# Patient Record
Sex: FEMALE | Race: WHITE
[De-identification: names, ages, dates, MRNs, and addresses within clinical notes are randomized per-mention and may not be internally consistent; named-entity substitution may affect disease eponyms.]

---

## 2020-08-11 ENCOUNTER — HOSPITAL ENCOUNTER (EMERGENCY)
Dept: HOSPITAL 8 - ED | Age: 65
Discharge: HOME | End: 2020-08-11
Payer: SELF-PAY

## 2020-08-11 VITALS — BODY MASS INDEX: 22.81 KG/M2 | HEIGHT: 65 IN | WEIGHT: 136.91 LBS

## 2020-08-11 VITALS — DIASTOLIC BLOOD PRESSURE: 82 MMHG | SYSTOLIC BLOOD PRESSURE: 118 MMHG

## 2020-08-11 DIAGNOSIS — R00.0: ICD-10-CM

## 2020-08-11 DIAGNOSIS — F17.210: ICD-10-CM

## 2020-08-11 DIAGNOSIS — A60.1: Primary | ICD-10-CM

## 2020-08-11 DIAGNOSIS — B37.9: ICD-10-CM

## 2020-08-11 PROCEDURE — 99283 EMERGENCY DEPT VISIT LOW MDM: CPT

## 2020-08-11 PROCEDURE — 93005 ELECTROCARDIOGRAM TRACING: CPT

## 2020-09-21 ENCOUNTER — HOSPITAL ENCOUNTER (OUTPATIENT)
Facility: MEDICAL CENTER | Age: 65
End: 2020-09-22
Attending: EMERGENCY MEDICINE | Admitting: HOSPITALIST

## 2020-09-21 ENCOUNTER — APPOINTMENT (OUTPATIENT)
Dept: RADIOLOGY | Facility: MEDICAL CENTER | Age: 65
End: 2020-09-21
Attending: EMERGENCY MEDICINE

## 2020-09-21 DIAGNOSIS — R10.30 INGUINAL PAIN, UNSPECIFIED LATERALITY: ICD-10-CM

## 2020-09-21 DIAGNOSIS — K62.9 LESION OF PERIANAL AREA: ICD-10-CM

## 2020-09-21 PROBLEM — L98.9 SKIN LESION OF RIGHT LOWER LIMB: Status: ACTIVE | Noted: 2020-09-21

## 2020-09-21 PROBLEM — N90.89 PERINEAL MASS IN FEMALE: Status: ACTIVE | Noted: 2020-09-21

## 2020-09-21 LAB
ANION GAP SERPL CALC-SCNC: 16 MMOL/L (ref 7–16)
BASOPHILS # BLD AUTO: 1.1 % (ref 0–1.8)
BASOPHILS # BLD: 0.08 K/UL (ref 0–0.12)
BUN SERPL-MCNC: 9 MG/DL (ref 8–22)
CALCIUM SERPL-MCNC: 8.4 MG/DL (ref 8.5–10.5)
CHLORIDE SERPL-SCNC: 106 MMOL/L (ref 96–112)
CO2 SERPL-SCNC: 16 MMOL/L (ref 20–33)
CREAT SERPL-MCNC: 0.54 MG/DL (ref 0.5–1.4)
EOSINOPHIL # BLD AUTO: 0.48 K/UL (ref 0–0.51)
EOSINOPHIL NFR BLD: 6.8 % (ref 0–6.9)
ERYTHROCYTE [DISTWIDTH] IN BLOOD BY AUTOMATED COUNT: 55.9 FL (ref 35.9–50)
GLUCOSE SERPL-MCNC: 78 MG/DL (ref 65–99)
HCT VFR BLD AUTO: 47.1 % (ref 37–47)
HGB BLD-MCNC: 15.3 G/DL (ref 12–16)
HIV 1+2 AB+HIV1 P24 AG SERPL QL IA: NORMAL
IMM GRANULOCYTES # BLD AUTO: 0.04 K/UL (ref 0–0.11)
IMM GRANULOCYTES NFR BLD AUTO: 0.6 % (ref 0–0.9)
LYMPHOCYTES # BLD AUTO: 1.47 K/UL (ref 1–4.8)
LYMPHOCYTES NFR BLD: 20.7 % (ref 22–41)
MCH RBC QN AUTO: 32.4 PG (ref 27–33)
MCHC RBC AUTO-ENTMCNC: 32.5 G/DL (ref 33.6–35)
MCV RBC AUTO: 99.8 FL (ref 81.4–97.8)
MONOCYTES # BLD AUTO: 0.46 K/UL (ref 0–0.85)
MONOCYTES NFR BLD AUTO: 6.5 % (ref 0–13.4)
NEUTROPHILS # BLD AUTO: 4.56 K/UL (ref 2–7.15)
NEUTROPHILS NFR BLD: 64.3 % (ref 44–72)
NRBC # BLD AUTO: 0 K/UL
NRBC BLD-RTO: 0 /100 WBC
PLATELET # BLD AUTO: 203 K/UL (ref 164–446)
PMV BLD AUTO: 9.5 FL (ref 9–12.9)
POTASSIUM SERPL-SCNC: 4.4 MMOL/L (ref 3.6–5.5)
RBC # BLD AUTO: 4.72 M/UL (ref 4.2–5.4)
SODIUM SERPL-SCNC: 138 MMOL/L (ref 135–145)
WBC # BLD AUTO: 7.1 K/UL (ref 4.8–10.8)

## 2020-09-21 PROCEDURE — 87529 HSV DNA AMP PROBE: CPT

## 2020-09-21 PROCEDURE — 80048 BASIC METABOLIC PNL TOTAL CA: CPT

## 2020-09-21 PROCEDURE — G0378 HOSPITAL OBSERVATION PER HR: HCPCS

## 2020-09-21 PROCEDURE — 87205 SMEAR GRAM STAIN: CPT

## 2020-09-21 PROCEDURE — 87186 SC STD MICRODIL/AGAR DIL: CPT

## 2020-09-21 PROCEDURE — 87147 CULTURE TYPE IMMUNOLOGIC: CPT

## 2020-09-21 PROCEDURE — 36415 COLL VENOUS BLD VENIPUNCTURE: CPT

## 2020-09-21 PROCEDURE — 99285 EMERGENCY DEPT VISIT HI MDM: CPT

## 2020-09-21 PROCEDURE — A9270 NON-COVERED ITEM OR SERVICE: HCPCS | Performed by: EMERGENCY MEDICINE

## 2020-09-21 PROCEDURE — 700102 HCHG RX REV CODE 250 W/ 637 OVERRIDE(OP): Performed by: EMERGENCY MEDICINE

## 2020-09-21 PROCEDURE — 87077 CULTURE AEROBIC IDENTIFY: CPT | Mod: 91

## 2020-09-21 PROCEDURE — 96376 TX/PRO/DX INJ SAME DRUG ADON: CPT

## 2020-09-21 PROCEDURE — 74177 CT ABD & PELVIS W/CONTRAST: CPT

## 2020-09-21 PROCEDURE — 99220 PR INITIAL OBSERVATION CARE,LEVL III: CPT | Mod: GC | Performed by: HOSPITALIST

## 2020-09-21 PROCEDURE — 85025 COMPLETE CBC W/AUTO DIFF WBC: CPT

## 2020-09-21 PROCEDURE — A9270 NON-COVERED ITEM OR SERVICE: HCPCS | Performed by: STUDENT IN AN ORGANIZED HEALTH CARE EDUCATION/TRAINING PROGRAM

## 2020-09-21 PROCEDURE — 700102 HCHG RX REV CODE 250 W/ 637 OVERRIDE(OP): Performed by: STUDENT IN AN ORGANIZED HEALTH CARE EDUCATION/TRAINING PROGRAM

## 2020-09-21 PROCEDURE — 700101 HCHG RX REV CODE 250: Performed by: EMERGENCY MEDICINE

## 2020-09-21 PROCEDURE — 96374 THER/PROPH/DIAG INJ IV PUSH: CPT

## 2020-09-21 PROCEDURE — 87070 CULTURE OTHR SPECIMN AEROBIC: CPT

## 2020-09-21 PROCEDURE — 96375 TX/PRO/DX INJ NEW DRUG ADDON: CPT

## 2020-09-21 PROCEDURE — 87389 HIV-1 AG W/HIV-1&-2 AB AG IA: CPT

## 2020-09-21 PROCEDURE — 700111 HCHG RX REV CODE 636 W/ 250 OVERRIDE (IP): Performed by: STUDENT IN AN ORGANIZED HEALTH CARE EDUCATION/TRAINING PROGRAM

## 2020-09-21 PROCEDURE — 700117 HCHG RX CONTRAST REV CODE 255: Performed by: EMERGENCY MEDICINE

## 2020-09-21 PROCEDURE — 700111 HCHG RX REV CODE 636 W/ 250 OVERRIDE (IP): Performed by: EMERGENCY MEDICINE

## 2020-09-21 RX ORDER — MORPHINE SULFATE 4 MG/ML
2 INJECTION, SOLUTION INTRAMUSCULAR; INTRAVENOUS
Status: DISCONTINUED | OUTPATIENT
Start: 2020-09-21 | End: 2020-09-22 | Stop reason: HOSPADM

## 2020-09-21 RX ORDER — POLYETHYLENE GLYCOL 3350 17 G/17G
1 POWDER, FOR SOLUTION ORAL
Status: DISCONTINUED | OUTPATIENT
Start: 2020-09-21 | End: 2020-09-21

## 2020-09-21 RX ORDER — ACYCLOVIR 800 MG/1
800 TABLET ORAL ONCE
Status: COMPLETED | OUTPATIENT
Start: 2020-09-21 | End: 2020-09-21

## 2020-09-21 RX ORDER — LIDOCAINE HYDROCHLORIDE 20 MG/ML
JELLY TOPICAL 4 TIMES DAILY
Status: DISCONTINUED | OUTPATIENT
Start: 2020-09-21 | End: 2020-09-22 | Stop reason: HOSPADM

## 2020-09-21 RX ORDER — CELECOXIB 100 MG/1
100 CAPSULE ORAL DAILY
Status: DISCONTINUED | OUTPATIENT
Start: 2020-09-21 | End: 2020-09-22 | Stop reason: HOSPADM

## 2020-09-21 RX ORDER — MORPHINE SULFATE 4 MG/ML
4 INJECTION, SOLUTION INTRAMUSCULAR; INTRAVENOUS ONCE
Status: COMPLETED | OUTPATIENT
Start: 2020-09-21 | End: 2020-09-21

## 2020-09-21 RX ORDER — HYDROMORPHONE HYDROCHLORIDE 1 MG/ML
0.5 INJECTION, SOLUTION INTRAMUSCULAR; INTRAVENOUS; SUBCUTANEOUS ONCE
Status: COMPLETED | OUTPATIENT
Start: 2020-09-21 | End: 2020-09-21

## 2020-09-21 RX ORDER — AMOXICILLIN 250 MG
2 CAPSULE ORAL 2 TIMES DAILY
Status: DISCONTINUED | OUTPATIENT
Start: 2020-09-21 | End: 2020-09-21

## 2020-09-21 RX ORDER — ACETAMINOPHEN 500 MG
500 TABLET ORAL EVERY 6 HOURS PRN
Status: DISCONTINUED | OUTPATIENT
Start: 2020-09-21 | End: 2020-09-21

## 2020-09-21 RX ORDER — NYSTATIN 100000 [USP'U]/G
POWDER TOPICAL 2 TIMES DAILY
Status: DISCONTINUED | OUTPATIENT
Start: 2020-09-21 | End: 2020-09-22 | Stop reason: HOSPADM

## 2020-09-21 RX ORDER — BISACODYL 10 MG
10 SUPPOSITORY, RECTAL RECTAL
Status: DISCONTINUED | OUTPATIENT
Start: 2020-09-21 | End: 2020-09-21

## 2020-09-21 RX ORDER — ACETAMINOPHEN 500 MG
1000 TABLET ORAL 3 TIMES DAILY
Status: DISCONTINUED | OUTPATIENT
Start: 2020-09-22 | End: 2020-09-22 | Stop reason: HOSPADM

## 2020-09-21 RX ORDER — BISACODYL 10 MG
10 SUPPOSITORY, RECTAL RECTAL
Status: DISCONTINUED | OUTPATIENT
Start: 2020-09-21 | End: 2020-09-22 | Stop reason: HOSPADM

## 2020-09-21 RX ORDER — POLYETHYLENE GLYCOL 3350 17 G/17G
1 POWDER, FOR SOLUTION ORAL DAILY
Status: DISCONTINUED | OUTPATIENT
Start: 2020-09-22 | End: 2020-09-22 | Stop reason: HOSPADM

## 2020-09-21 RX ORDER — LIDOCAINE AND PRILOCAINE 25; 25 MG/G; MG/G
CREAM TOPICAL ONCE
Status: COMPLETED | OUTPATIENT
Start: 2020-09-21 | End: 2020-09-21

## 2020-09-21 RX ORDER — LIDOCAINE HYDROCHLORIDE 20 MG/ML
JELLY TOPICAL 4 TIMES DAILY PRN
Status: DISCONTINUED | OUTPATIENT
Start: 2020-09-21 | End: 2020-09-21

## 2020-09-21 RX ORDER — AMOXICILLIN 250 MG
2 CAPSULE ORAL 2 TIMES DAILY
Status: DISCONTINUED | OUTPATIENT
Start: 2020-09-22 | End: 2020-09-22 | Stop reason: HOSPADM

## 2020-09-21 RX ORDER — LIDOCAINE HYDROCHLORIDE 20 MG/ML
JELLY TOPICAL
Status: DISCONTINUED | OUTPATIENT
Start: 2020-09-21 | End: 2020-09-21

## 2020-09-21 RX ADMIN — MORPHINE SULFATE 4 MG: 4 INJECTION INTRAVENOUS at 18:24

## 2020-09-21 RX ADMIN — MORPHINE SULFATE 2 MG: 4 INJECTION INTRAVENOUS at 22:24

## 2020-09-21 RX ADMIN — IOHEXOL 100 ML: 350 INJECTION, SOLUTION INTRAVENOUS at 21:32

## 2020-09-21 RX ADMIN — MORPHINE SULFATE 4 MG: 4 INJECTION INTRAVENOUS at 19:39

## 2020-09-21 RX ADMIN — LIDOCAINE AND PRILOCAINE 5 MG: 25; 25 CREAM TOPICAL at 19:47

## 2020-09-21 RX ADMIN — CELECOXIB 100 MG: 100 CAPSULE ORAL at 23:42

## 2020-09-21 RX ADMIN — HYDROMORPHONE HYDROCHLORIDE 0.5 MG: 1 INJECTION, SOLUTION INTRAMUSCULAR; INTRAVENOUS; SUBCUTANEOUS at 21:10

## 2020-09-21 RX ADMIN — ACYCLOVIR 800 MG: 800 TABLET ORAL at 19:43

## 2020-09-21 SDOH — HEALTH STABILITY: MENTAL HEALTH: HOW OFTEN DO YOU HAVE A DRINK CONTAINING ALCOHOL?: NEVER

## 2020-09-21 ASSESSMENT — LIFESTYLE VARIABLES
ALCOHOL_USE: NO
DOES PATIENT WANT TO STOP DRINKING: NO
HAVE PEOPLE ANNOYED YOU BY CRITICIZING YOUR DRINKING: NO
ON A TYPICAL DAY WHEN YOU DRINK ALCOHOL HOW MANY DRINKS DO YOU HAVE: 0
HAVE YOU EVER FELT YOU SHOULD CUT DOWN ON YOUR DRINKING: NO
TOTAL SCORE: 0
TOTAL SCORE: 0
EVER HAD A DRINK FIRST THING IN THE MORNING TO STEADY YOUR NERVES TO GET RID OF A HANGOVER: NO
EVER FELT BAD OR GUILTY ABOUT YOUR DRINKING: NO
TOTAL SCORE: 0
HOW MANY TIMES IN THE PAST YEAR HAVE YOU HAD 5 OR MORE DRINKS IN A DAY: 0
AVERAGE NUMBER OF DAYS PER WEEK YOU HAVE A DRINK CONTAINING ALCOHOL: 0
CONSUMPTION TOTAL: NEGATIVE

## 2020-09-21 ASSESSMENT — PATIENT HEALTH QUESTIONNAIRE - PHQ9
SUM OF ALL RESPONSES TO PHQ9 QUESTIONS 1 AND 2: 0
2. FEELING DOWN, DEPRESSED, IRRITABLE, OR HOPELESS: NOT AT ALL
1. LITTLE INTEREST OR PLEASURE IN DOING THINGS: NOT AT ALL

## 2020-09-21 ASSESSMENT — PAIN DESCRIPTION - PAIN TYPE: TYPE: ACUTE PAIN

## 2020-09-21 NOTE — ED TRIAGE NOTES
"Kayleen Alfredo  65 y.o.  Chief Complaint   Patient presents with   • Rectal Bleeding     Rectal Herpes   • Hearing Loss     in right ear       Patient to triage with above complaint. Pt report having genital and rectal herpes and they are bleeding \"the pain in excruciating\".  Pt also report having trouble hearing in right ear.    Vitals:    09/21/20 1504   BP: 144/98   Pulse: 100   Resp: 18   Temp: 36.5 °C (97.7 °F)   SpO2: 95%       Triage process explained to patient, apologized for wait time, and returned to [facility].  Pt informed to notify staff of any change in condition. NAD at this time.    "

## 2020-09-22 ENCOUNTER — PATIENT OUTREACH (OUTPATIENT)
Dept: HEALTH INFORMATION MANAGEMENT | Facility: OTHER | Age: 65
End: 2020-09-22

## 2020-09-22 ENCOUNTER — ANESTHESIA (OUTPATIENT)
Dept: SURGERY | Facility: MEDICAL CENTER | Age: 65
End: 2020-09-22

## 2020-09-22 ENCOUNTER — ANESTHESIA EVENT (OUTPATIENT)
Dept: SURGERY | Facility: MEDICAL CENTER | Age: 65
End: 2020-09-22

## 2020-09-22 VITALS
BODY MASS INDEX: 21.98 KG/M2 | DIASTOLIC BLOOD PRESSURE: 53 MMHG | HEART RATE: 71 BPM | OXYGEN SATURATION: 92 % | WEIGHT: 128.75 LBS | SYSTOLIC BLOOD PRESSURE: 110 MMHG | HEIGHT: 64 IN | RESPIRATION RATE: 17 BRPM | TEMPERATURE: 97.1 F

## 2020-09-22 PROBLEM — R52 INTRACTABLE PAIN: Status: ACTIVE | Noted: 2020-09-22

## 2020-09-22 PROBLEM — R71.8 ELEVATED MCV: Status: ACTIVE | Noted: 2020-09-22

## 2020-09-22 PROBLEM — K59.00 CONSTIPATION: Status: ACTIVE | Noted: 2020-09-22

## 2020-09-22 LAB
ALBUMIN SERPL BCP-MCNC: 2.6 G/DL (ref 3.2–4.9)
ALBUMIN/GLOB SERPL: 0.7 G/DL
ALP SERPL-CCNC: 86 U/L (ref 30–99)
ALT SERPL-CCNC: 33 U/L (ref 2–50)
AMPHET UR QL SCN: NEGATIVE
ANION GAP SERPL CALC-SCNC: 9 MMOL/L (ref 7–16)
APTT PPP: 29.9 SEC (ref 24.7–36)
AST SERPL-CCNC: 36 U/L (ref 12–45)
BARBITURATES UR QL SCN: NEGATIVE
BASOPHILS # BLD AUTO: 1.1 % (ref 0–1.8)
BASOPHILS # BLD: 0.07 K/UL (ref 0–0.12)
BENZODIAZ UR QL SCN: POSITIVE
BILIRUB SERPL-MCNC: 0.2 MG/DL (ref 0.1–1.5)
BUN SERPL-MCNC: 11 MG/DL (ref 8–22)
BZE UR QL SCN: NEGATIVE
CALCIUM SERPL-MCNC: 8 MG/DL (ref 8.5–10.5)
CANNABINOIDS UR QL SCN: NEGATIVE
CHLORIDE SERPL-SCNC: 103 MMOL/L (ref 96–112)
CO2 SERPL-SCNC: 22 MMOL/L (ref 20–33)
COVID ORDER STATUS COVID19: NORMAL
CREAT SERPL-MCNC: 0.6 MG/DL (ref 0.5–1.4)
EOSINOPHIL # BLD AUTO: 0.49 K/UL (ref 0–0.51)
EOSINOPHIL NFR BLD: 8 % (ref 0–6.9)
ERYTHROCYTE [DISTWIDTH] IN BLOOD BY AUTOMATED COUNT: 55.8 FL (ref 35.9–50)
FOLATE SERPL-MCNC: 9 NG/ML
GLOBULIN SER CALC-MCNC: 3.6 G/DL (ref 1.9–3.5)
GLUCOSE SERPL-MCNC: 121 MG/DL (ref 65–99)
GRAM STN SPEC: NORMAL
HCT VFR BLD AUTO: 39.7 % (ref 37–47)
HGB BLD-MCNC: 13.2 G/DL (ref 12–16)
HSV1 DNA SPEC QL NAA+PROBE: NEGATIVE
HSV2 DNA SPEC QL NAA+PROBE: NEGATIVE
IMM GRANULOCYTES # BLD AUTO: 0.03 K/UL (ref 0–0.11)
IMM GRANULOCYTES NFR BLD AUTO: 0.5 % (ref 0–0.9)
INR PPP: 1.11 (ref 0.87–1.13)
LYMPHOCYTES # BLD AUTO: 1.17 K/UL (ref 1–4.8)
LYMPHOCYTES NFR BLD: 19 % (ref 22–41)
MCH RBC QN AUTO: 33.2 PG (ref 27–33)
MCHC RBC AUTO-ENTMCNC: 33.2 G/DL (ref 33.6–35)
MCV RBC AUTO: 100 FL (ref 81.4–97.8)
METHADONE UR QL SCN: NEGATIVE
MONOCYTES # BLD AUTO: 0.39 K/UL (ref 0–0.85)
MONOCYTES NFR BLD AUTO: 6.3 % (ref 0–13.4)
NEUTROPHILS # BLD AUTO: 4 K/UL (ref 2–7.15)
NEUTROPHILS NFR BLD: 65.1 % (ref 44–72)
NRBC # BLD AUTO: 0 K/UL
NRBC BLD-RTO: 0 /100 WBC
OPIATES UR QL SCN: POSITIVE
OXYCODONE UR QL SCN: NEGATIVE
PATHOLOGY CONSULT NOTE: NORMAL
PCP UR QL SCN: NEGATIVE
PLATELET # BLD AUTO: 284 K/UL (ref 164–446)
PMV BLD AUTO: 8.6 FL (ref 9–12.9)
POTASSIUM SERPL-SCNC: 4 MMOL/L (ref 3.6–5.5)
PROPOXYPH UR QL SCN: NEGATIVE
PROT SERPL-MCNC: 6.2 G/DL (ref 6–8.2)
PROTHROMBIN TIME: 14.7 SEC (ref 12–14.6)
RBC # BLD AUTO: 3.97 M/UL (ref 4.2–5.4)
RPR SER QL: REACTIVE
RPR SER-TITR: NORMAL {TITER}
SARS-COV-2 RNA RESP QL NAA+PROBE: NOTDETECTED
SIGNIFICANT IND 70042: NORMAL
SITE SITE: NORMAL
SODIUM SERPL-SCNC: 134 MMOL/L (ref 135–145)
SOURCE SOURCE: NORMAL
SPECIMEN SOURCE: NORMAL
SPECIMEN SOURCE: NORMAL
TREPONEMA PALLIDUM IGG+IGM AB [PRESENCE] IN SERUM OR PLASMA BY IMMUNOASSAY: REACTIVE
VIT B12 SERPL-MCNC: 392 PG/ML (ref 211–911)
WBC # BLD AUTO: 6.2 K/UL (ref 4.8–10.8)

## 2020-09-22 PROCEDURE — 82746 ASSAY OF FOLIC ACID SERUM: CPT

## 2020-09-22 PROCEDURE — A9270 NON-COVERED ITEM OR SERVICE: HCPCS | Performed by: STUDENT IN AN ORGANIZED HEALTH CARE EDUCATION/TRAINING PROGRAM

## 2020-09-22 PROCEDURE — 82607 VITAMIN B-12: CPT

## 2020-09-22 PROCEDURE — 85730 THROMBOPLASTIN TIME PARTIAL: CPT

## 2020-09-22 PROCEDURE — C9803 HOPD COVID-19 SPEC COLLECT: HCPCS | Performed by: SURGERY

## 2020-09-22 PROCEDURE — 700111 HCHG RX REV CODE 636 W/ 250 OVERRIDE (IP): Performed by: STUDENT IN AN ORGANIZED HEALTH CARE EDUCATION/TRAINING PROGRAM

## 2020-09-22 PROCEDURE — 700102 HCHG RX REV CODE 250 W/ 637 OVERRIDE(OP): Performed by: STUDENT IN AN ORGANIZED HEALTH CARE EDUCATION/TRAINING PROGRAM

## 2020-09-22 PROCEDURE — 700105 HCHG RX REV CODE 258: Performed by: ANESTHESIOLOGY

## 2020-09-22 PROCEDURE — 88305 TISSUE EXAM BY PATHOLOGIST: CPT

## 2020-09-22 PROCEDURE — 160048 HCHG OR STATISTICAL LEVEL 1-5: Performed by: SURGERY

## 2020-09-22 PROCEDURE — 86592 SYPHILIS TEST NON-TREP QUAL: CPT

## 2020-09-22 PROCEDURE — A6403 STERILE GAUZE>16 <= 48 SQ IN: HCPCS | Performed by: SURGERY

## 2020-09-22 PROCEDURE — U0003 INFECTIOUS AGENT DETECTION BY NUCLEIC ACID (DNA OR RNA); SEVERE ACUTE RESPIRATORY SYNDROME CORONAVIRUS 2 (SARS-COV-2) (CORONAVIRUS DISEASE [COVID-19]), AMPLIFIED PROBE TECHNIQUE, MAKING USE OF HIGH THROUGHPUT TECHNOLOGIES AS DESCRIBED BY CMS-2020-01-R: HCPCS

## 2020-09-22 PROCEDURE — 80053 COMPREHEN METABOLIC PANEL: CPT

## 2020-09-22 PROCEDURE — 700101 HCHG RX REV CODE 250: Performed by: SURGERY

## 2020-09-22 PROCEDURE — 160009 HCHG ANES TIME/MIN: Performed by: SURGERY

## 2020-09-22 PROCEDURE — 160035 HCHG PACU - 1ST 60 MINS PHASE I: Performed by: SURGERY

## 2020-09-22 PROCEDURE — 160002 HCHG RECOVERY MINUTES (STAT): Performed by: SURGERY

## 2020-09-22 PROCEDURE — G0378 HOSPITAL OBSERVATION PER HR: HCPCS

## 2020-09-22 PROCEDURE — 86780 TREPONEMA PALLIDUM: CPT

## 2020-09-22 PROCEDURE — 80307 DRUG TEST PRSMV CHEM ANLYZR: CPT

## 2020-09-22 PROCEDURE — 99217 PR OBSERVATION CARE DISCHARGE: CPT | Mod: GC | Performed by: HOSPITALIST

## 2020-09-22 PROCEDURE — 86593 SYPHILIS TEST NON-TREP QUANT: CPT

## 2020-09-22 PROCEDURE — 36415 COLL VENOUS BLD VENIPUNCTURE: CPT

## 2020-09-22 PROCEDURE — 160028 HCHG SURGERY MINUTES - 1ST 30 MINS LEVEL 3: Performed by: SURGERY

## 2020-09-22 PROCEDURE — 85610 PROTHROMBIN TIME: CPT

## 2020-09-22 PROCEDURE — 88342 IMHCHEM/IMCYTCHM 1ST ANTB: CPT

## 2020-09-22 PROCEDURE — 700101 HCHG RX REV CODE 250: Performed by: ANESTHESIOLOGY

## 2020-09-22 PROCEDURE — 96372 THER/PROPH/DIAG INJ SC/IM: CPT

## 2020-09-22 PROCEDURE — 96376 TX/PRO/DX INJ SAME DRUG ADON: CPT

## 2020-09-22 PROCEDURE — 700111 HCHG RX REV CODE 636 W/ 250 OVERRIDE (IP): Performed by: ANESTHESIOLOGY

## 2020-09-22 PROCEDURE — 501838 HCHG SUTURE GENERAL: Performed by: SURGERY

## 2020-09-22 PROCEDURE — 85025 COMPLETE CBC W/AUTO DIFF WBC: CPT

## 2020-09-22 RX ORDER — IPRATROPIUM BROMIDE AND ALBUTEROL SULFATE 2.5; .5 MG/3ML; MG/3ML
3 SOLUTION RESPIRATORY (INHALATION)
Status: DISCONTINUED | OUTPATIENT
Start: 2020-09-22 | End: 2020-09-22 | Stop reason: HOSPADM

## 2020-09-22 RX ORDER — OXYCODONE HYDROCHLORIDE 5 MG/1
5-10 TABLET ORAL
Status: DISCONTINUED | OUTPATIENT
Start: 2020-09-22 | End: 2020-09-22 | Stop reason: HOSPADM

## 2020-09-22 RX ORDER — BUPIVACAINE HYDROCHLORIDE AND EPINEPHRINE 5; 5 MG/ML; UG/ML
INJECTION, SOLUTION EPIDURAL; INTRACAUDAL; PERINEURAL
Status: DISCONTINUED | OUTPATIENT
Start: 2020-09-22 | End: 2020-09-22 | Stop reason: HOSPADM

## 2020-09-22 RX ORDER — MAGNESIUM SULFATE HEPTAHYDRATE 40 MG/ML
INJECTION, SOLUTION INTRAVENOUS PRN
Status: DISCONTINUED | OUTPATIENT
Start: 2020-09-22 | End: 2020-09-22 | Stop reason: SURG

## 2020-09-22 RX ORDER — MEPERIDINE HYDROCHLORIDE 25 MG/ML
6.25 INJECTION INTRAMUSCULAR; INTRAVENOUS; SUBCUTANEOUS
Status: DISCONTINUED | OUTPATIENT
Start: 2020-09-22 | End: 2020-09-22 | Stop reason: HOSPADM

## 2020-09-22 RX ORDER — OXYCODONE HCL 5 MG/5 ML
10 SOLUTION, ORAL ORAL
Status: DISCONTINUED | OUTPATIENT
Start: 2020-09-22 | End: 2020-09-22 | Stop reason: HOSPADM

## 2020-09-22 RX ORDER — CEFAZOLIN SODIUM 1 G/3ML
INJECTION, POWDER, FOR SOLUTION INTRAMUSCULAR; INTRAVENOUS PRN
Status: DISCONTINUED | OUTPATIENT
Start: 2020-09-22 | End: 2020-09-22 | Stop reason: SURG

## 2020-09-22 RX ORDER — DEXAMETHASONE SODIUM PHOSPHATE 4 MG/ML
INJECTION, SOLUTION INTRA-ARTICULAR; INTRALESIONAL; INTRAMUSCULAR; INTRAVENOUS; SOFT TISSUE PRN
Status: DISCONTINUED | OUTPATIENT
Start: 2020-09-22 | End: 2020-09-22 | Stop reason: SURG

## 2020-09-22 RX ORDER — SODIUM CHLORIDE, SODIUM LACTATE, POTASSIUM CHLORIDE, CALCIUM CHLORIDE 600; 310; 30; 20 MG/100ML; MG/100ML; MG/100ML; MG/100ML
INJECTION, SOLUTION INTRAVENOUS CONTINUOUS
Status: DISCONTINUED | OUTPATIENT
Start: 2020-09-22 | End: 2020-09-22 | Stop reason: HOSPADM

## 2020-09-22 RX ORDER — CEFTRIAXONE SODIUM 250 MG/1
250 INJECTION, POWDER, FOR SOLUTION INTRAMUSCULAR; INTRAVENOUS ONCE
Status: COMPLETED | OUTPATIENT
Start: 2020-09-22 | End: 2020-09-22

## 2020-09-22 RX ORDER — OXYCODONE HCL 5 MG/5 ML
5 SOLUTION, ORAL ORAL
Status: DISCONTINUED | OUTPATIENT
Start: 2020-09-22 | End: 2020-09-22 | Stop reason: HOSPADM

## 2020-09-22 RX ORDER — SODIUM CHLORIDE, SODIUM LACTATE, POTASSIUM CHLORIDE, CALCIUM CHLORIDE 600; 310; 30; 20 MG/100ML; MG/100ML; MG/100ML; MG/100ML
INJECTION, SOLUTION INTRAVENOUS
Status: DISCONTINUED | OUTPATIENT
Start: 2020-09-22 | End: 2020-09-22 | Stop reason: SURG

## 2020-09-22 RX ORDER — ACETAMINOPHEN 500 MG
500 TABLET ORAL EVERY 6 HOURS PRN
Qty: 50 TAB | Refills: 0 | Status: SHIPPED | OUTPATIENT
Start: 2020-09-22 | End: 2021-08-23

## 2020-09-22 RX ORDER — LABETALOL HYDROCHLORIDE 5 MG/ML
5 INJECTION, SOLUTION INTRAVENOUS
Status: DISCONTINUED | OUTPATIENT
Start: 2020-09-22 | End: 2020-09-22 | Stop reason: HOSPADM

## 2020-09-22 RX ORDER — ONDANSETRON 2 MG/ML
INJECTION INTRAMUSCULAR; INTRAVENOUS PRN
Status: DISCONTINUED | OUTPATIENT
Start: 2020-09-22 | End: 2020-09-22 | Stop reason: SURG

## 2020-09-22 RX ORDER — AZITHROMYCIN 250 MG/1
1000 TABLET, FILM COATED ORAL ONCE
Status: COMPLETED | OUTPATIENT
Start: 2020-09-22 | End: 2020-09-22

## 2020-09-22 RX ORDER — NICOTINE 21 MG/24HR
21 PATCH, TRANSDERMAL 24 HOURS TRANSDERMAL
Status: DISCONTINUED | OUTPATIENT
Start: 2020-09-22 | End: 2020-09-22 | Stop reason: HOSPADM

## 2020-09-22 RX ORDER — HYDROMORPHONE HYDROCHLORIDE 1 MG/ML
0.2 INJECTION, SOLUTION INTRAMUSCULAR; INTRAVENOUS; SUBCUTANEOUS
Status: DISCONTINUED | OUTPATIENT
Start: 2020-09-22 | End: 2020-09-22 | Stop reason: HOSPADM

## 2020-09-22 RX ORDER — HYDROMORPHONE HYDROCHLORIDE 1 MG/ML
0.1 INJECTION, SOLUTION INTRAMUSCULAR; INTRAVENOUS; SUBCUTANEOUS
Status: DISCONTINUED | OUTPATIENT
Start: 2020-09-22 | End: 2020-09-22 | Stop reason: HOSPADM

## 2020-09-22 RX ORDER — MIDAZOLAM HYDROCHLORIDE 1 MG/ML
INJECTION INTRAMUSCULAR; INTRAVENOUS PRN
Status: DISCONTINUED | OUTPATIENT
Start: 2020-09-22 | End: 2020-09-22 | Stop reason: SURG

## 2020-09-22 RX ORDER — ONDANSETRON 2 MG/ML
4 INJECTION INTRAMUSCULAR; INTRAVENOUS
Status: DISCONTINUED | OUTPATIENT
Start: 2020-09-22 | End: 2020-09-22 | Stop reason: HOSPADM

## 2020-09-22 RX ORDER — DIPHENHYDRAMINE HYDROCHLORIDE 50 MG/ML
12.5 INJECTION INTRAMUSCULAR; INTRAVENOUS
Status: DISCONTINUED | OUTPATIENT
Start: 2020-09-22 | End: 2020-09-22 | Stop reason: HOSPADM

## 2020-09-22 RX ORDER — HYDROMORPHONE HYDROCHLORIDE 1 MG/ML
0.4 INJECTION, SOLUTION INTRAMUSCULAR; INTRAVENOUS; SUBCUTANEOUS
Status: DISCONTINUED | OUTPATIENT
Start: 2020-09-22 | End: 2020-09-22 | Stop reason: HOSPADM

## 2020-09-22 RX ORDER — HALOPERIDOL 5 MG/ML
1 INJECTION INTRAMUSCULAR
Status: DISCONTINUED | OUTPATIENT
Start: 2020-09-22 | End: 2020-09-22 | Stop reason: HOSPADM

## 2020-09-22 RX ORDER — MIDAZOLAM HYDROCHLORIDE 1 MG/ML
1 INJECTION INTRAMUSCULAR; INTRAVENOUS
Status: DISCONTINUED | OUTPATIENT
Start: 2020-09-22 | End: 2020-09-22 | Stop reason: HOSPADM

## 2020-09-22 RX ORDER — LIDOCAINE HYDROCHLORIDE 20 MG/ML
INJECTION, SOLUTION EPIDURAL; INFILTRATION; INTRACAUDAL; PERINEURAL PRN
Status: DISCONTINUED | OUTPATIENT
Start: 2020-09-22 | End: 2020-09-22 | Stop reason: SURG

## 2020-09-22 RX ADMIN — SODIUM CHLORIDE, POTASSIUM CHLORIDE, SODIUM LACTATE AND CALCIUM CHLORIDE: 600; 310; 30; 20 INJECTION, SOLUTION INTRAVENOUS at 09:42

## 2020-09-22 RX ADMIN — CEFTRIAXONE SODIUM 250 MG: 250 INJECTION, POWDER, FOR SOLUTION INTRAMUSCULAR; INTRAVENOUS at 17:15

## 2020-09-22 RX ADMIN — MORPHINE SULFATE 2 MG: 4 INJECTION INTRAVENOUS at 02:53

## 2020-09-22 RX ADMIN — MORPHINE SULFATE 2 MG: 4 INJECTION INTRAVENOUS at 00:38

## 2020-09-22 RX ADMIN — CEFAZOLIN 2 G: 330 INJECTION, POWDER, FOR SOLUTION INTRAMUSCULAR; INTRAVENOUS at 09:54

## 2020-09-22 RX ADMIN — AZITHROMYCIN MONOHYDRATE 1000 MG: 250 TABLET ORAL at 16:50

## 2020-09-22 RX ADMIN — Medication 100 MG: at 09:45

## 2020-09-22 RX ADMIN — MORPHINE SULFATE 2 MG: 4 INJECTION INTRAVENOUS at 08:15

## 2020-09-22 RX ADMIN — CELECOXIB 100 MG: 100 CAPSULE ORAL at 13:59

## 2020-09-22 RX ADMIN — MIDAZOLAM HYDROCHLORIDE 2 MG: 1 INJECTION, SOLUTION INTRAMUSCULAR; INTRAVENOUS at 09:42

## 2020-09-22 RX ADMIN — ONDANSETRON 4 MG: 2 INJECTION INTRAMUSCULAR; INTRAVENOUS at 09:45

## 2020-09-22 RX ADMIN — FENTANYL CITRATE 50 MCG: 50 INJECTION, SOLUTION INTRAMUSCULAR; INTRAVENOUS at 10:07

## 2020-09-22 RX ADMIN — DEXAMETHASONE SODIUM PHOSPHATE 4 MG: 4 INJECTION, SOLUTION INTRA-ARTICULAR; INTRALESIONAL; INTRAMUSCULAR; INTRAVENOUS; SOFT TISSUE at 09:45

## 2020-09-22 RX ADMIN — FENTANYL CITRATE 50 MCG: 50 INJECTION, SOLUTION INTRAMUSCULAR; INTRAVENOUS at 10:04

## 2020-09-22 RX ADMIN — MAGNESIUM SULFATE IN WATER 2 G: 40 INJECTION, SOLUTION INTRAVENOUS at 09:54

## 2020-09-22 RX ADMIN — NICOTINE TRANSDERMAL SYSTEM 21 MG: 21 PATCH, EXTENDED RELEASE TRANSDERMAL at 05:48

## 2020-09-22 RX ADMIN — PENICILLIN G BENZATHINE 2.4 MILLION UNITS: 1200000 INJECTION, SUSPENSION INTRAMUSCULAR at 17:15

## 2020-09-22 RX ADMIN — MORPHINE SULFATE 2 MG: 4 INJECTION INTRAVENOUS at 05:05

## 2020-09-22 RX ADMIN — PROPOFOL 150 MG: 10 INJECTION, EMULSION INTRAVENOUS at 09:45

## 2020-09-22 RX ADMIN — LIDOCAINE HYDROCHLORIDE 100 MG: 20 INJECTION, SOLUTION EPIDURAL; INFILTRATION; INTRACAUDAL at 09:45

## 2020-09-22 RX ADMIN — EPHEDRINE SULFATE 10 MG: 50 INJECTION, SOLUTION INTRAVENOUS at 09:57

## 2020-09-22 RX ADMIN — ACETAMINOPHEN 1000 MG: 500 TABLET ORAL at 05:05

## 2020-09-22 RX ADMIN — ACETAMINOPHEN 1000 MG: 500 TABLET ORAL at 13:59

## 2020-09-22 ASSESSMENT — PAIN SCALES - GENERAL: PAIN_LEVEL: 4

## 2020-09-22 ASSESSMENT — PAIN DESCRIPTION - PAIN TYPE
TYPE: ACUTE PAIN
TYPE: ACUTE PAIN

## 2020-09-22 NOTE — PROGRESS NOTES
Pt back from PACU. Pt AAO X4. Pt tearful and stating she doesn't remember why she come into the hospital. Dicussed events with pt. Emotional support provided to pt. Perineal area with fluff dressing, CDI, VVS.

## 2020-09-22 NOTE — ED NOTES
"IV started, blood sent to lab, medicated for pain, pt refusing BP cuff, rips it off every time it pumps us.  Pt's main concern is getting something to eat, \"I haven't eaten all day.\" ERP aware.  "

## 2020-09-22 NOTE — PROGRESS NOTES
"Pt stated \"Sorry I'm a bitch even time you come in. Im just in pain\". Pt medicated with Morphine. Pt informed her pain will be controled the best we can   "

## 2020-09-22 NOTE — SENIOR ADMIT NOTE
"Senior Admit Note    Kayleen Alfredo is a 65 y.o. frustrated female with a history of homelessness who presented to the ED with intractable genital pain. Patient was agitated at the time of the encounter and did not provide adaquete history. Per chart review, patient had a bout of diarrhea about several weeks ago and had noticed a raised lesion. She went to an outside facility and was given a script for acyclovir. She denies sexual activity of more than a year, history of STIs or other skin changes.     In the ED, patient was noted to be hemodynamically stable. She was found to have unremarkable lab findings. HIV testing was negative. CT A/P was negative for acute findings. Patient received acyclovir, Dilaudid 0.5mg, lidocaine cream and two doses of IV Morphine 4mg.     Pertinent physical exam:   General: Talking in loud voice, frustrated, refusing to comply with interview questions and exam   : Patient was reluctant to have a physical exam since she had shown her lesions to multiple providers. Reassured patient that that was completely okay. However, as we were about to leave patient raised gown for a few seconds and said \"that's all you get.\" Erythematous Califlower like lesions surrounding the rectum and labia majora.    Assessment and Plan:   # Intractable pain   - Scheduled Tylenol    - Celebrex   - Topical Lidocaine   - IV Morphine     # Rectal vulvar lesions concerning for malignancy   CT A/P negative. Gynecology recommended exam/biopsy under general anesthesia.   - Consider General surgery consult in AM     # Constipation   - Bowel protocol     Please see Dr. Rivero's H&P for full details    Jaime Deleon D.O., [facility] Resident  "

## 2020-09-22 NOTE — ANESTHESIA QCDR
2019 [facility] Clinical Data Registry (for Quality Improvement)     Postoperative nausea/vomiting risk protocol (Adult = 18 yrs and Pediatric 3-17 yrs)- (430 and 463)  General inhalation anesthetic (NOT TIVA) with PONV risk factors: Yes  Provision of anti-emetic therapy with at least 2 different classes of agents: Yes   Patient DID NOT receive anti-emetic therapy and reason is documented in Medical Record:  N/A    Multimodal Pain Management- (477)  Non-emergent surgery AND patient age >= 18: Yes  Use of Multimodal Pain Management, two or more drugs and/or interventions, NOT including systemic opioids: Yes  Exception: Documented allergy to multiple classes of analgesics: N/A    Smoking Abstinence (404)  Patient is current smoker (cigarette, pipe, e-cig, marijuanna): Yes  Elective Surgery: Yes  Abstinence instructions provided prior to day of surgery: Yes  Patient abstained from smoking on day of surgery: No    Pre-Op Beta-Blocker in Isolated CABG (44)  Isolated CABG AND patient age >= 18: No  Beta-blocker admin within 24 hours of surgical incision:   Exception:of medical reason(s) for not administering beta blocker within 24 hours prior to surgical incision (e.g., not  indicated,other medical reason):     PACU assessment of acute postoperative pain prior to Anesthesia Care End- Applies to Patients Age = 18- (ABG7)  Initial PACU pain score is which of the following: < 7/10  Patient unable to report pain score: N/A    Post-anesthetic transfer of care checklist/protocol to PACU/ICU- (426 and 427)  Upon conclusion of case, patient transferred to which of the following locations: PACU/Non-ICU  Use of transfer checklist/protocol: Yes  Exclusion: Service Performed in Patient Hospital Room (and thus did not require transfer): N/A  Unplanned admission to ICU related to anesthesia service up through end of PACU care- (MD51)  Unplanned admission to ICU (not initially anticipated at anesthesia start time): No

## 2020-09-22 NOTE — DISCHARGE PLANNING
Received call from [facility] resident and plan is to D/C patient with instructions to F/U with a PCP. Bus Pass # [id number] placed in chart for patient. Samantha PARRA updated.

## 2020-09-22 NOTE — ED NOTES
Med rec updated and complete. Allergies reviewed. Pt is not currently  Taking any medications.      No preferred home pharmacy

## 2020-09-22 NOTE — PROGRESS NOTES
64yo with perineal lesion concerning for possible malignancy and sec infection. ERP will discuss with Gyn regarding Bx.      Patient will be admitted [facility] discussed with Dr Deleon

## 2020-09-22 NOTE — DISCHARGE PLANNING
Care Transition Team Assessment    Spoke with patient at bedside. Patient is homeless and came to [place] from [facility] with in the last 2 weeks. Patient states her daughter dropped her off here. Has no ID but states her ID is being mailed to homeless shelter. Has no DME's. Plans to get a place once her ID gets to shelter because she has funds in [facility]. Anticipate return to shelter on D/C. May need bus pass on D/C.    Information Source  Orientation : Oriented x 4    Readmission Evaluation  Is this a readmission?: No    Interdisciplinary Discharge Planning  Primary Care Physician: None  Lives with - Patient's Self Care Capacity: Other (Comments)(Home less staying on streets and shelter)  Patient or legal guardian wants to designate a caregiver: No  Support Systems: None  Housing / Facility: Homeless  Do You Take your Prescribed Medications Regularly: Yes  Able to Return to Previous ADL's: Yes  Mobility Issues: No  Prior Services: None  Assistance Needed: Unknown at this Time  Durable Medical Equipment: Not Applicable    Discharge Preparedness  What are your discharge supports?: (None)    Finances  Prescription Coverage: (Has Medical)      Discharge Risks or Barriers  Discharge risks or barriers?: No PCP, Uninsured / underinsured, Homeless / couch surfing  Patient risk factors: Homeless, No PCP

## 2020-09-22 NOTE — ED NOTES
Patient is wondering the halls looking for the nurse and asking for pain meds. Paulette redirected the patient multiple times back to her room and reminded her that the nurse is with another patient. And will be there as soon as possible.

## 2020-09-22 NOTE — ASSESSMENT & PLAN NOTE
-Ordered 2mg morphine Q2H PRN, tylenol 1000mg tid, and celebrex 100mg daily and lidocaine gel for pain control.  -Continue to monitor

## 2020-09-22 NOTE — CONSULTS
DATE OF SERVICE:  09/22/2020    SURGERY CONSULTATION    HISTORY OF PRESENT ILLNESS:  The patient is a 65-year-old woman who I have   been asked to evaluate further for perianal lesions.  She presented to the   emergency department for further evaluation of these.  She states they had   been present for about 3 weeks.  She states that she has been diagnosed with   this being herpes, but they have enlarged and are quite painful.      PAST MEDICAL HISTORY:  She has a medical history, which she states is   unremarkable.      PAST SURGICAL HISTORY:  She states none.      MEDICATIONS:  Prior to admission, none.      ALLERGIES:  SHE IS ALLERGIC TO MACRODANTIN.      SOCIAL HISTORY:  She smokes.  Denies illicit drugs and is currently homeless.        FAMILY HISTORY:  Essentially negative.      REVIEW OF SYSTEMS:  she states that she has had some diarrhea and then these   lesions have been present for about 5 weeks, otherwise negative per AMA and   CMS criteria.      PHYSICAL EXAMINATION:    VITAL SIGNS:  She has a temperature of 36.4, pulse of 73, blood pressure   123/51.    GENERAL:  She is in no distress.    HEENT:  Unremarkable.  Pupils are equal.  Oropharynx is without lesions.    NECK:  Supple.    LUNGS:  Clear.    CARDIOVASCULAR:  Reveals regular rate and rhythm.    ABDOMEN:  Soft and nontender.    EXTREMITIES:  Symmetrical, without clubbing, cyanosis or edema.    GENITOURINARY:  In the perianal area, she does have these were raised   plaque-like lesions, which certainly could be herpes, but alternatively could   represent anal squamous cell cancer.  NEUROLOGIC:  She is neurologically intact without any gross detectable motor   or sensory deficits.      LABORATORY DATA:  She has a white count of 6.2, hematocrit 39.7, platelets of   284.  Sodium is 134, potassium is 4, chloride is 103, CO2 is 22, BUN is 11,   creatinine is 0.60.  Transaminases are normal.      DIAGNOSTIC DATA:  She had a CT scan, which was unremarkable.       IMPRESSION:  A 65-year-old woman with plaque-like lesions in the perianal   area.  Precise nature of these is unclear.  An incisional biopsy to establish   a definitive diagnosis is indicated.  We will make arrangements to do this.       ____________________________________     MD TO LINDSAY / DUANE    DD:  09/22/2020 11:50:50  DT:  09/22/2020 12:03:17    D#:  [id number]  Job#:  [id number]

## 2020-09-22 NOTE — PROGRESS NOTES
Pt transferred from ER Y64 to T208, via transporter. Pt ambulated from rney to bed with steady gait. Monitors applied. Box meal given.

## 2020-09-22 NOTE — PROGRESS NOTES
This RN was told that her primary RN smelt cigarette smoke coming from the pt room. Pt denied that she was smoking in her room. This RN noted cigarette smoke coming from patient room as well as outside bathroom. Pt denied smoking. Security was called. This RN went thru patient belongings with security at bedside and patient present. Pt did present Rn with 2, half cigarettes as well as a partial pack of cigarettes, and a lighter. These items were logged and placed in patient bin. Pt still denies smoking. will continue to monitor.

## 2020-09-22 NOTE — ANESTHESIA PROCEDURE NOTES
Airway    Date/Time: 9/22/2020 9:46 AM  Performed by: Mary Stanton M.D.  Authorized by: Mary Stanton M.D.     Location:  OR  Urgency:  Elective  Difficult Airway: No    Indications for Airway Management:  Anesthesia      Spontaneous Ventilation: absent    Sedation Level:  Deep  Preoxygenated: Yes    Patient Position:  Sniffing  Mask Difficulty Assessment:  0 - not attempted  Final Airway Type:  Endotracheal airway  Final Endotracheal Airway:  ETT  Cuffed: Yes    Technique Used for Successful ETT Placement:  Direct laryngoscopy    Insertion Site:  Oral  Blade Type:  Alfredo  Laryngoscope Blade/Videolaryngoscope Blade Size:  2  ETT Size (mm):  7.0  Measured from:  Lips  ETT to Lips (cm):  21  Placement Verified by: capnometry    Cormack-Lehane Classification:  Grade I - full view of glottis

## 2020-09-22 NOTE — CONSULTS
DATE OF SERVICE:  09/21/2020    ATTENDING PHYSICIAN:  Jayme Wayne MD    CONSULTING PHYSICIAN:  Roxana Love MD    IDENTIFICATION:  This is a 65-year-old para 1 female who presented to the   emergency room complaining of rectal and buttock pain.    HISTORY OF PRESENT ILLNESS:  The patient is currently homeless.  She presented   to the emergency room complaining of approximately a month long history of   rectal pain, essentially started after a bad bout of diarrhea about a month   ago.  She went to [place] [facility]'s Emergency Room approximately 2-3 weeks ago, was   told that she was having a primary herpetic outbreak.  She was given a   prescription of acyclovir, but she was unable to afford the prescription.  She   questioned the diagnosis as she denies any recent sexual activity.  She has   not had sexual intercourse in over a year.  She also has had no anal   intercourse ever.  She has had no other prodromal symptoms, fevers, chills,   body aches.  She has just had ongoing moisture and pain in that area and she   noticed a raised lesion.  Due to the fact that she is homeless and has no car,   she has to walk everywhere and this just became unbearably painful today.    She is being admitted to the hospitalist service for pain control.  I was   asked to look and see if this would primarily be a gynecological issue or an   issue for general surgery.    PAST MEDICAL HISTORY:  She reports she had a thyroid issue at one point.    PAST SURGICAL HISTORY:  She denies.    OBSTETRICAL HISTORY:  ____ reports a history of multiple miscarriages.    ALLERGIES:  TO MACRODANTIN.    SOCIAL HISTORY:  She is currently single, homeless.  She has smoked at some   point in her life.  She denies the use of alcohol or other drugs.    PHYSICAL EXAMINATION:  On physical exam today she is alert and cooperative.    She only let me look at her problem area.  She did not want me to touch   anything as it is so painful.  On inspection, she  has raised plaquish lesions   that have a white overlay to them, appeared to be emanating from the rectum in   a cauliflower-type pattern surrounding the rectum and then there is some   extension up both labia majora, but the primary area is directly around her   rectum.  She declined a rectal exam or any other touching of the perineum at   this time.    LABORATORY DATA:  Labs obtained -- her white blood cell count is 7.1,   hemoglobin 15, hematocrit 47.  Her chemistry panel is normal.  An HIV test was   negative.    ASSESSMENT AND PLAN:  A 65-year-old homeless female with presentation of   rectal pain.  She had a history of diarrhea with now raised plaques on the   skin surrounding the rectum and extending up to the labia majora.    Differential diagnosis is primarily cancer of the rectum versus extensive   yeast infection, although this is less likely infectious etiology as she does   have a normal white blood cell count and had no other symptoms for infection.    I agree with the recommendation for admission for pain control.  I would   recommend that she likely undergo an examination under anesthesia, probably   more appropriately done by general surgery.  If you would like us to be   involved, please let me know.  Otherwise, I will sign off on the patient at   this time.  Thank you for your consultation.  Please let me know if I can be   involved.  I did discuss the plan of care with Dr. Wayne.       ____________________________________     MD MARTHA Collins / DUANE    DD:  09/21/2020 20:32:39  DT:  09/21/2020 21:29:50    D#:  [id number]  Job#:  [id number]

## 2020-09-22 NOTE — ASSESSMENT & PLAN NOTE
Patient presents with multiple large raised red lesions in the perineal area. Pt says that these lesions began 3-5 weeks ago and are very painful.    -Rectal cancer vs extensive yeast infection vs condyloma acuminatum  -CT abdomen/pelvis showed no acute findings abdominal or pelvic findings.  -Ob/Gyn consulted and recommended that pt undergo an examination under anesthesia, probably more appropriately done by general surgery.   -Consider surgery consult in the morning.  -Consider biopsy. Ordered coag labs.  -HIV test negative  -Ordered HSV and syphilis ab test

## 2020-09-22 NOTE — ANESTHESIA TIME REPORT
Anesthesia Start and Stop Event Times     Date Time Event    9/22/2020 0908 Ready for Procedure     0942 Anesthesia Start     1017 Anesthesia Stop        Responsible Staff  09/22/20    Name Role Begin End    Mary Stanton M.D. Anesth 0942 1017        Preop Diagnosis (Free Text):  Pre-op Diagnosis     CAROLYNE ANAL MASS        Preop Diagnosis (Codes):    Post op Diagnosis  Perianal mass      Premium Reason  Non-Premium    Comments:

## 2020-09-22 NOTE — PROGRESS NOTES
65 yof with perianal mass of unclear nature  Biopsy is indicated  Discussed risks and benefits  Wishes to proceed

## 2020-09-22 NOTE — H&P
History & Physical Note    Date of Admission: 9/22/2020  Admission Status: Observation-Outpatient  [facility] Team: [facility] IM Red Team   Attending: Davy Hernandez M.D.   Senior Resident: Dr. Galeas  Contact Number: [phone]    Chief Complaint: New onset of painful perineal lesions    History of Present Illness (HPI):   Kayleen is a 65 y.o. female w/ self-reported distant history of graves disease who presented 9/21/2020 with 3-5 week complaint of new onset painful perineal lesions. Patient that 5 weeks ago she had an episode of diarrhea, which resolved on its own, but then she started developing painful raised perineal lesions, which have then continued to get worse. Pt says that she went to [place]'s emergency room 2-3 weeks ago and diagnosed with herpetic outbreak. Says that she was given acyclovir, but was unable to afford the medication. Denies any previous history of similar symptoms. Says that she is currently homeless. Says that she was last sexually active more than a year ago. Patient denies any previous history of STIs or anal intercourse. Denies any current vaginal bleeding, fever, chills or myalgia.    Patient refused to answer any more questions during the encounter because she says that she was tired.    Review of Systems: Patient refused to answer questions during the encounter.   Review of Systems   Unable to perform ROS: Other   Skin: Positive for itching and rash.   .  Past Medical History:   Past Medical History was reviewed with patient.   has no past medical history on file.    Past Surgical History: Past Surgical History was not reviewed with patient.   has no past surgical history on file.    Medications: Medications have been reviewed with patient.  None        Allergies: Allergies have been not reviewed with patient.  Allergies   Allergen Reactions   • Macrodantin [Nitrofurantoin]      High fever     Patient refused to provide full history during encounter.  Family History:   family history is  not on file.     Social History:   Tobacco: Unknown  Alcohol:  Unknown  Recreational drugs (illegal and prescription):  Unknown   Employment: Unknown  Activity Level: Unknown  Living situation:  Currently homeless  Recent travel:  Unknown  Primary Care Provider: not reviewed No primary care provider on file.  Other (stressors, spirituality, exposures):  Unknown    Vitals:  Temp:  [36.5 °C (97.7 °F)-37.2 °C (99 °F)] 37.2 °C (99 °F)  Pulse:  [] 68  Resp:  [18-20] 19  BP: (143-177)/() 150/74  SpO2:  [93 %-98 %] 95 %    Physical Exam  Exam conducted with a chaperone present.   Cardiovascular:      Rate and Rhythm: Normal rate and regular rhythm.      Heart sounds: Murmur present. No friction rub. No gallop.    Pulmonary:      Effort: Pulmonary effort is normal. No respiratory distress.      Breath sounds: Normal breath sounds. No wheezing or rales.   Skin:     Findings: Lesion present.     : Patient had multiple large red raised lesions around her perineal area area    Unable to complete full physical exam due to patient refusal.    Labs: Please see results for labs.      Imaging:   CT-ABDOMEN-PELVIS WITH   Final Result      1.  No acute abdominal or pelvic findings.   2.  Colonic diverticulosis, mainly in the sigmoid.   3.  Moderately increased colonic fecal material consistent with constipation.          Previous Data Review: reviewed    Problem Representation:     Perineal mass in female  Assessment & Plan  Patient presents with multiple large raised red lesions in the perineal area. Pt says that these lesions began 3-5 weeks ago and are very painful.    -Rectal cancer vs extensive yeast infection vs condyloma acuminatum  -CT abdomen/pelvis showed no acute findings abdominal or pelvic findings.  -Ob/Gyn consulted and recommended that pt undergo an examination under anesthesia, probably more appropriately done by general surgery.   -Consider surgery consult in the morning.  -Consider biopsy. Ordered coag  labs.  -HIV test negative  -Ordered HSV and syphilis ab test        Intractable pain- (present on admission)  Assessment & Plan  -Ordered 2mg morphine Q2H PRN, tylenol 1000mg tid, and celebrex 100mg daily and lidocaine gel for pain control.  -Continue to monitor    Elevated MCV  Assessment & Plan  -Ordered folate and B12 check for further evaluation.    Constipation  Assessment & Plan  CT abdomen pelvis showed constipation.  -Ordered bowel regimen with daily scheduled miralax.    Skin lesion of right lower limb  Assessment & Plan  Patient had skin lesion on left axilla, however patient refused examination during encounter.    -Continue to monitor.

## 2020-09-22 NOTE — CARE PLAN
Problem: Communication  Goal: The ability to communicate needs accurately and effectively will improve  Outcome: PROGRESSING AS EXPECTED     Problem: Safety  Goal: Will remain free from injury  Outcome: PROGRESSING AS EXPECTED  Goal: Will remain free from falls  Outcome: PROGRESSING AS EXPECTED     Problem: Pain Management  Goal: Pain level will decrease to patient's comfort goal  Outcome: PROGRESSING AS EXPECTED     Problem: Infection  Goal: Will remain free from infection  Outcome: PROGRESSING AS EXPECTED      0

## 2020-09-22 NOTE — OR NURSING
Pt very emotional and wanting to go back to her room. Pt refused to have her blood pressure taken when she woke up but RN tried to calm her and take one blood pressure before Pt was transferred to her room.     Vital signs stable. Dressing to perineal area, CDI. Ice pack applied. Pt denies pain at this time. Pt denies nausea. Pt able to take sips of water with no difficulty. Pt moves all extremities.     Report given to AIDA Singh.    Pt via bed, accompanied by RN and CNA, was transferred to [facility] at 1105.

## 2020-09-22 NOTE — DISCHARGE INSTRUCTIONS
Biopsy  Care After  Syphilis  Syphilis is an infection that can spread through sexual contact. The infection can cause serious complications, so it is important to get treatment right away.  There are four stages of syphilis:  · Primary stage. During this stage sores may form where the disease entered your body.  · Secondary stage. During this stage skin rashes and lesions will form.  · Latent stage. During this stage there are no symptoms, but the infection may still be contagious.  · Tertiary stage. This stage happens 10-30 years after the infection starts. During this stage, the disease damages organs and can lead to death. Most people do not develop this stage of syphilis.  What are the causes?  This condition is caused by bacteria called Treponema pallidum. The condition can spread during sexual activity, such as during oral, anal, or vaginal sex. It can also be spread from mother to fetus during pregnancy.  What increases the risk?  You are more likely to develop this condition if:  · You do not use a condom.  · You have or had another sexually transmitted infection (STI).  · You have multiple sex partners.  · You use illegal drugs through an IV.  What are the signs or symptoms?  Symptoms of this condition depend on the stage of the disease.  Primary stage  · Painless sores (chancres) in and around the genital organs, mouth, or hands. The sores are usually firm and round.  Secondary stage  · A rash or sores. The rash usually does not itch.  · A fever.  · A headache.  · A sore throat.  · Swollen lymph nodes.  · New sores in the mouth or on the genitals.  · A feeling of being ill.  · Pain in the joints.  · Patchy hair loss.  · Weight loss.  · Fatigue.  Latent stage  There are no symptoms during this stage.  Tertiary stage  · Dementia.  · Personality and mood changes.  · Difficulty walking and coordinating movements.  · Muscle weakness or paralysis.  · Problems with coordination.  · Heart  "failure.  · Trouble breathing.  · Fainting.  · Soft, rubbery growths on the skin, bones, or liver (gummas).  · Sudden \"lightning\" pains, numbness, or tingling.  · Vision changes.  · Hearing changes.  · Trouble controlling your urine and bowel movements.  How is this diagnosed?  This condition is diagnosed with:  · A physical exam.  · Blood tests.  · Tests of the the fluid (drainage) from a sore or rash.  · Tests of the fluid around the spine (lumbar puncture). These tests are done to check for an infection in the brain or nervous system.  · Imaging tests. These may be done to check for damage to the heart, aorta, or brain if the condition is in the tertiary stage. Tests may include:  ? An X-ray.  ? A CT scan.  ? An MRI.  ? An echocardiogram. This test takes a picture of the heart.  ? An ultrasound.  How is this treated?  This condition can be cured with antibiotic medicine. During the first day of treatment, the medicine may cause you to experience fever, chills, headache, nausea, or aching all over your body. This is normal and should go away within 24 hours.  Follow these instructions at home:  Medicines    · Take over-the-counter and prescription medicines only as told by your health care provider.  · Take your antibiotic medicine as told by your health care provider. Do not stop taking the antibiotic even if you start to feel better. Incomplete treatment will put you at risk for continued infection and could be life threatening.  General instructions  · Do not have sex until your treatment is completed, or as directed by your health care provider.  · Tell your recent sexual partners that you were diagnosed with syphilis. It is important that they get treatment, even if they do not have symptoms.  · Keep all follow-up visits as told by your health care provider. This is important.  How is this prevented?  · Use latex condoms correctly whenever you have sex.  · Before you have sex, ask your partner if he or she " has been tested for STIs. Ask about the test results.  · Avoid having multiple sexual partners.  Contact a health care provider if:  · You continue to have any of the following symptoms 24 hours after beginning treatment:  ? Fever.  ? Chills.  ? Headache.  ? Nausea.  ? Aching all over your body.  · Your symptoms do not improve, even with treatment.  Get help right away if:  · You have severe chest pain.  · You have trouble walking or coordinating movements.  · You are confused.  · You lose vision or hearing.  · You have numbness in your arms or legs.  · You have a seizure.  · You faint.  · You have a severe headache that does not go away with medicine.  Summary  · Syphilis is an infection that can spread through sexual contact.  · This condition can cause serious complications, so it is best to get treatment right away. The condition can be cured with antibiotic medicine.  · This condition can also be spread from mother to fetus during pregnancy.  · Take your antibiotic medicine as told by your health care provider.  · Tell your recent sexual partners that you were diagnosed with syphilis. It is important that they get treatment, even if they do not have symptoms.  This information is not intended to replace advice given to you by your health care provider. Make sure you discuss any questions you have with your health care provider.  Document Released: 10/08/2014 Document Revised: 01/28/2020 Document Reviewed: 02/13/2018  [employer] Patient Education © 2020 [employer]    Refer to this sheet in the next few weeks. These instructions provide you with information on caring for yourself after your procedure. Your caregiver may also give you more specific instructions. Your treatment has been planned according to current medical practices, but problems sometimes occur. Call your caregiver if you have any problems or questions after your procedure.  If you had a fine needle biopsy, you may have soreness at the biopsy  site for 1 to 2 days. If you had an open biopsy, you may have soreness at the biopsy site for 3 to 4 days.  HOME CARE INSTRUCTIONS   · You may resume normal diet and activities as directed.  · Change bandages (dressings) as directed. If your wound was closed with a skin glue (adhesive), it will wear off and begin to peel in 7 days.  · Only take over-the-counter or prescription medicines for pain, discomfort, or fever as directed by your caregiver.  · Ask your caregiver when you can bathe and get your wound wet.  SEEK IMMEDIATE MEDICAL CARE IF:   · You have increased bleeding (more than a small spot) from the biopsy site.  · You notice redness, swelling, or increasing pain at the biopsy site.  · You have pus coming from the biopsy site.  · You have a fever.  · You notice a bad smell coming from the biopsy site or dressing.  · You have a rash, have difficulty breathing, or have any allergic problems.  MAKE SURE YOU:   · Understand these instructions.  · Will watch your condition.  · Will get help right away if you are not doing well or get worse.  Document Released: 07/07/2006 Document Revised: 03/11/2013 Document Reviewed: 06/14/2012  [employer]® Patient Information ©2014 [employer].  Discharge Instructions    Discharged to home by car with self. Discharged via walking, hospital escort: Refused.  Special equipment needed: Not Applicable    Be sure to schedule a follow-up appointment with your primary care doctor or any specialists as instructed.     Discharge Plan:   Diet Plan: Discussed  Activity Level: Discussed  Confirmed Follow up Appointment: Patient to Call and Schedule Appointment  Confirmed Symptoms Management: Discussed  Medication Reconciliation Updated: Yes  Influenza Vaccine Indication: Patient Refuses    I understand that a diet low in cholesterol, fat, and sodium is recommended for good health. Unless I have been given specific instructions below for another diet, I accept this instruction as my diet  prescription.   Other diet: heart healthy     Special Instructions: None    · Is patient discharged on Warfarin / Coumadin?   No     Depression / Suicide Risk    As you are discharged from this [facility] facility, it is important to learn how to keep safe from harming yourself.    Recognize the warning signs:  · Abrupt changes in personality, positive or negative- including increase in energy   · Giving away possessions  · Change in eating patterns- significant weight changes-  positive or negative  · Change in sleeping patterns- unable to sleep or sleeping all the time   · Unwillingness or inability to communicate  · Depression  · Unusual sadness, discouragement and loneliness  · Talk of wanting to die  · Neglect of personal appearance   · Rebelliousness- reckless behavior  · Withdrawal from people/activities they love  · Confusion- inability to concentrate     If you or a loved one observes any of these behaviors or has concerns about self-harm, here's what you can do:  · Talk about it- your feelings and reasons for harming yourself  · Remove any means that you might use to hurt yourself (examples: pills, rope, extension cords, firearm)  · Get professional help from the community (Mental Health, Substance Abuse, psychological counseling)  · Do not be alone:Call your Safe Contact- someone whom you trust who will be there for you.  · Call your local CRISIS HOTLINE 323-2724 or 985-333-9919  · Call your local Children's Mobile Crisis Response Team [place] (410) 519-3360 or www.TravelMuse  · Call the toll free National Suicide Prevention Hotlines   · National Suicide Prevention Lifeline 142-306-GQZR (2916)  · National Hope Line Network 800-SUICIDE (366-3468)

## 2020-09-22 NOTE — ANESTHESIA POSTPROCEDURE EVALUATION
Patient: Kayleen Alfredo    Procedure Summary     Date: 09/22/20 Room / Location: [facility] 06 / SURGERY [facility]    Anesthesia Start: 0942 Anesthesia Stop: 1017    Procedures:       EXAM UNDER ANESTHESIA - ANAL BIOPSY      BIOPSY, RECTUM Diagnosis: (CAROLYNE ANAL MASS)    Surgeon: Jaime Tuttle M.D. Responsible Provider: Mary Stanton M.D.    Anesthesia Type: general ASA Status: 3          Final Anesthesia Type: general  Last vitals  BP   Blood Pressure: 119/78    Temp   36.1 °C (97 °F)    Pulse   Pulse: 69   Resp   20    SpO2   96 %      Anesthesia Post Evaluation    Patient location during evaluation: PACU  Patient participation: complete - patient participated  Level of consciousness: awake and awake and alert  Pain score: 4    Airway patency: patent  Anesthetic complications: no  Cardiovascular status: adequate and hemodynamically stable  Respiratory status: acceptable, spontaneous ventilation and face mask  Hydration status: acceptable    PONV: none           Nurse Pain Score: 4 (NPRS)

## 2020-09-22 NOTE — ANESTHESIA PREPROCEDURE EVALUATION
65yF with PMHx of:  COPD- +Tobacco smoke  Hx of Graves disease  +hx herpes  +homeless    Denies hx of heart disease, stroke    Allergies to macrodantin  NPO  No Ac  Covid neg    Relevant Problems   No relevant active problems       Physical Exam    Airway   Mallampati: II       Cardiovascular - normal exam     Dental - normal exam           Pulmonary - normal exam     Abdominal - normal exam     Neurological - normal exam                 Anesthesia Plan    ASA 3   ASA physical status 3 criteria: COPD    Plan - general       Airway plan will be ETT        Induction: intravenous and rapid sequence    Postoperative Plan: Postoperative administration of opioids is intended.    Pertinent diagnostic labs and testing reviewed    Informed Consent:    Anesthetic plan and risks discussed with patient.

## 2020-09-22 NOTE — OP REPORT
DATE OF SERVICE:  09/22/2020    SURGEON:  Jaime Tuttle MD    PREOPERATIVE DIAGNOSIS:  Perianal lesions.    POSTOPERATIVE DIAGNOSIS:  Perianal lesions.    PROCEDURE PERFORMED:  Incisional biopsy of perianal lesion.    ANESTHESIA:  General endotracheal anesthesia.    ANESTHESIOLOGIST:  Mary Stanton MD    INDICATIONS:  A 65-year-old woman with perianal lesions of uncertain nature.    Excision of this lesion was indicated.    PROCEDURE:  The procedure was discussed in detail with the patient including   the risk of bleeding, infection, abscess, and hematoma.  The fact that this   was a diagnostic biopsy and not intended to resolve the problem was discussed   as well.  She understood all the above and wished to proceed.  She was placed   under anesthesia by Dr. Mary Stanton.  She was subsequently placed in the   prone position.  Appropriate exposure was obtained.  Plaque-like lesions were   readily identified.  An incisional biopsy was performed.  Hemostasis assured   with cautery and the area was approximated with interrupted chromic sutures.    The area was infiltrated with 20 mL of 0.5% Marcaine with epinephrine.    Patient tolerated the procedure well without apparent complication.  Final   counts were reported as correct.       ____________________________________     MD TO LINDSAY / DUANE    DD:  09/22/2020 11:53:00  DT:  09/22/2020 12:17:57    D#:  [id number]  Job#:  [id number]

## 2020-09-22 NOTE — NON-PROVIDER
Daily Progress Note:     Date of Service: 9/22/2020  Primary Team: [facility] Team  Attending: Davy Hernandez M.D.   Senior Resident: Dr. Villagomez  Intern: Dr. Lr  Contact:  [phone]    Chief Complaint:   Painful Perineal Lesions  Chief Complaint   Patient presents with   • Rectal Bleeding     Rectal Herpes   • Hearing Loss     in right ear       Subjective:  HPI  Kayleen Alfredo is a 65 y.o. female who presents w/ painful perineal lesions, onset 3-5 weeks ago. The patient refused to answer most questions this morning and most of the history was obtained via the admitting resident, although she refused to answer most questions then as well. She had an episode of self limited diarrhea about 5 weeks ago, at which point she started to develop painful lesions. She reported to the [place] [place] [facility] ED three weeks ago and was given Acyclovir for a suspected herpetic outbreak, however she was not able to afford the medication. The lesions went away for about a week and then returned, worse than before. She has never had similar symptoms before. She was last sexually active about eight months to a year ago and denies any anal intercourse. A skin lesion was also noted on the right leg as well by the ED. She describes the pain as sharp and 10/10, but mostly controlled with the pain medications. She denies any itching or discharge.    OB GYN was consulted and recommended an exam and biopsy under general anesthesia with general surgery. The patient is scheduled for this at 9:15AM and she has agreed to this procedure.    The patient was caught smoking in her room last night and the cigarettes were removed and she was put on nicotine replacement.    Social Hx  -Homeless  -Current smoker  -Refused further questioning on social history    Family Hx  -Refused    Past Medical Hx  -Prior C-sections. No prior STIs. No further past medical history disclosed    Review of Systems:   -Refused questioning    Objective Data:     Vitals:   Temp:   [36.1 °C (96.9 °F)-37.2 °C (99 °F)] 36.2 °C (97.1 °F)  Pulse:  [] 71  Resp:  [12-20] 17  BP: ()/() 110/53  SpO2:  [92 %-100 %] 92 %  Body mass index is 22.1 kg/m².     Physical Exam:   Physical Exam  Constitutional:       General: She is not in acute distress.  HENT:      Head: Normocephalic and atraumatic.   Neurological:      Mental Status: She is alert and oriented to person, place, and time.     -Refused exam    Labs:     Recent Results (from the past 24 hour(s))   CBC WITH DIFFERENTIAL    Collection Time: 09/21/20  6:27 PM   Result Value Ref Range    WBC 7.1 4.8 - 10.8 K/uL    RBC 4.72 4.20 - 5.40 M/uL    Hemoglobin 15.3 12.0 - 16.0 g/dL    Hematocrit 47.1 (H) 37.0 - 47.0 %    MCV 99.8 (H) 81.4 - 97.8 fL    MCH 32.4 27.0 - 33.0 pg    MCHC 32.5 (L) 33.6 - 35.0 g/dL    RDW 55.9 (H) 35.9 - 50.0 fL    Platelet Count 203 164 - 446 K/uL    MPV 9.5 9.0 - 12.9 fL    Neutrophils-Polys 64.30 44.00 - 72.00 %    Lymphocytes 20.70 (L) 22.00 - 41.00 %    Monocytes 6.50 0.00 - 13.40 %    Eosinophils 6.80 0.00 - 6.90 %    Basophils 1.10 0.00 - 1.80 %    Immature Granulocytes 0.60 0.00 - 0.90 %    Nucleated RBC 0.00 /100 WBC    Neutrophils (Absolute) 4.56 2.00 - 7.15 K/uL    Lymphs (Absolute) 1.47 1.00 - 4.80 K/uL    Monos (Absolute) 0.46 0.00 - 0.85 K/uL    Eos (Absolute) 0.48 0.00 - 0.51 K/uL    Baso (Absolute) 0.08 0.00 - 0.12 K/uL    Immature Granulocytes (abs) 0.04 0.00 - 0.11 K/uL    NRBC (Absolute) 0.00 K/uL   BASIC METABOLIC PANEL    Collection Time: 09/21/20  6:27 PM   Result Value Ref Range    Sodium 138 135 - 145 mmol/L    Potassium 4.4 3.6 - 5.5 mmol/L    Chloride 106 96 - 112 mmol/L    Co2 16 (L) 20 - 33 mmol/L    Glucose 78 65 - 99 mg/dL    Bun 9 8 - 22 mg/dL    Creatinine 0.54 0.50 - 1.40 mg/dL    Calcium 8.4 (L) 8.5 - 10.5 mg/dL    Anion Gap 16.0 7.0 - 16.0   HIV AG/AB COMBO ASSAY SCREENING    Collection Time: 09/21/20  6:27 PM   Result Value Ref Range    HIV Ag/Ab Combo Assay Non-Reactive Non  Reactive   ESTIMATED GFR    Collection Time: 09/21/20  6:27 PM   Result Value Ref Range    GFR If African American >60 >60 mL/min/1.73 m 2    GFR If Non African American >60 >60 mL/min/1.73 m 2   HSV 1/2 By PCR(Herpes)+[id number]    Collection Time: 09/21/20  7:04 PM    Specimen: Anal/Rectal; Wound   Result Value Ref Range    Source Other    Comp Metabolic Panel (CMP)    Collection Time: 09/22/20  6:22 AM   Result Value Ref Range    Sodium 134 (L) 135 - 145 mmol/L    Potassium 4.0 3.6 - 5.5 mmol/L    Chloride 103 96 - 112 mmol/L    Co2 22 20 - 33 mmol/L    Anion Gap 9.0 7.0 - 16.0    Glucose 121 (H) 65 - 99 mg/dL    Bun 11 8 - 22 mg/dL    Creatinine 0.60 0.50 - 1.40 mg/dL    Calcium 8.0 (L) 8.5 - 10.5 mg/dL    AST(SGOT) 36 12 - 45 U/L    ALT(SGPT) 33 2 - 50 U/L    Alkaline Phosphatase 86 30 - 99 U/L    Total Bilirubin 0.2 0.1 - 1.5 mg/dL    Albumin 2.6 (L) 3.2 - 4.9 g/dL    Total Protein 6.2 6.0 - 8.2 g/dL    Globulin 3.6 (H) 1.9 - 3.5 g/dL    A-G Ratio 0.7 g/dL   CBC with Differential    Collection Time: 09/22/20  6:22 AM   Result Value Ref Range    WBC 6.2 4.8 - 10.8 K/uL    RBC 3.97 (L) 4.20 - 5.40 M/uL    Hemoglobin 13.2 12.0 - 16.0 g/dL    Hematocrit 39.7 37.0 - 47.0 %    .0 (H) 81.4 - 97.8 fL    MCH 33.2 (H) 27.0 - 33.0 pg    MCHC 33.2 (L) 33.6 - 35.0 g/dL    RDW 55.8 (H) 35.9 - 50.0 fL    Platelet Count 284 164 - 446 K/uL    MPV 8.6 (L) 9.0 - 12.9 fL    Neutrophils-Polys 65.10 44.00 - 72.00 %    Lymphocytes 19.00 (L) 22.00 - 41.00 %    Monocytes 6.30 0.00 - 13.40 %    Eosinophils 8.00 (H) 0.00 - 6.90 %    Basophils 1.10 0.00 - 1.80 %    Immature Granulocytes 0.50 0.00 - 0.90 %    Nucleated RBC 0.00 /100 WBC    Neutrophils (Absolute) 4.00 2.00 - 7.15 K/uL    Lymphs (Absolute) 1.17 1.00 - 4.80 K/uL    Monos (Absolute) 0.39 0.00 - 0.85 K/uL    Eos (Absolute) 0.49 0.00 - 0.51 K/uL    Baso (Absolute) 0.07 0.00 - 0.12 K/uL    Immature Granulocytes (abs) 0.03 0.00 - 0.11 K/uL    NRBC (Absolute) 0.00 K/uL    VITAMIN B12    Collection Time: 09/22/20  6:22 AM   Result Value Ref Range    Vitamin B12 -True Cobalamin 392 211 - 911 pg/mL   FOLATE    Collection Time: 09/22/20  6:22 AM   Result Value Ref Range    Folate -Folic Acid 9.0 >4.0 ng/mL   Prothrombin Time    Collection Time: 09/22/20  6:22 AM   Result Value Ref Range    PT 14.7 (H) 12.0 - 14.6 sec    INR 1.11 0.87 - 1.13   APTT    Collection Time: 09/22/20  6:22 AM   Result Value Ref Range    APTT 29.9 24.7 - 36.0 sec   ESTIMATED GFR    Collection Time: 09/22/20  6:22 AM   Result Value Ref Range    GFR If African American >60 >60 mL/min/1.73 m 2    GFR If Non African American >60 >60 mL/min/1.73 m 2   COVID/SARS CoV-2 PCR    Collection Time: 09/22/20  8:00 AM    Specimen: Nasal; Respirate   Result Value Ref Range    COVID Order Status Received    SARS-CoV-2, PCR (In-House)    Collection Time: 09/22/20  8:00 AM   Result Value Ref Range    SARS-CoV-2 Source NP Swab     SARS-CoV-2 by PCR NotDetected    Histology Request    Collection Time: 09/22/20 11:15 AM   Result Value Ref Range    Pathology Request Sent to Histo        Imaging:   CT-ABDOMEN-PELVIS WITH   Final Result      1.  No acute abdominal or pelvic findings.   2.  Colonic diverticulosis, mainly in the sigmoid.   3.  Moderately increased colonic fecal material consistent with constipation.          Problem Representation:     1. Perineal Lesions  Patient refused to let us visualize the lesions. She is undergoing biopsy today.   • Talked to pharmacy, given the clinical suspicion of STD, they recommended empiric treatment for gonorrhea and chlamydia. Plan for Ceftriaxone  mg and Azithromycin PO 1 g.  • General surgery biopsy later today  • Will plan on D/C today and set a follow-up appointment with outpatient provider to discuss biopsy results and design treatment plan.

## 2020-09-22 NOTE — ED PROVIDER NOTES
ED Provider Note    Scribed for Jayme Wayne M.D. by Gaston Singh. 9/21/2020, 5:19 PM.    Primary care provider: None noted  Means of arrival: Walk In  History obtained from: Patient   History limited by: None    CHIEF COMPLAINT  Chief Complaint   Patient presents with   • Rectal Bleeding     Rectal Herpes   • Hearing Loss     in right ear       HPI  Kayleen Alfredo is a 65 y.o. female who presents to the Emergency Department for evaluation of worsening rectal herpes onset 4-5 days ago. She states that the pain is so severe she cannot walk. Patient reports associated rectal bleeding and chills. She was prescribed Acyclovir but could not afford it. She has tried to medicate with other numbing creams but they have not alleviated her symptoms. She denies a history of vaginal herpes, but states that the rectal herpes seem to be spreading. Patient also reports acute onset of worsening hearing loss bilaterally onset 4-5 days ago which she believes may be an infection. Patient denies fever, headache, confusion, and neck stiffness. She denies history of anal sex or new sexual partners recently. Patient denies a history of Diabetes Mellitus or HIV.    Patient also reports she noticed a black mole-like lesion under her arm 3-4 days ago that she wants checked for cancer.    I verified that the patient was wearing a mask and I was wearing appropriate PPE every time I entered the room. The patient's mask was on the patient at all times during my encounter.    REVIEW OF SYSTEMS  Pertinent positives include rectal herpes, rectal bleeding, rectal pain, bilateral hearing loss, chills and lesion under arm. Pertinent negatives include fever, headache, confusion, or neck stiffness. All other systems negative.    PAST MEDICAL HISTORY   None noted    SURGICAL HISTORY  patient denies any surgical history    SOCIAL HISTORY  Social History     Tobacco Use   • Smoking status: Current Every Day Smoker     Packs/day: 0.50     Types:  "Cigarettes   • Smokeless tobacco: Never Used   Substance Use Topics   • Alcohol use: Never     Frequency: Never   • Drug use: Never      Social History     Substance and Sexual Activity   Drug Use Never       FAMILY HISTORY  History reviewed. No pertinent family history.    CURRENT MEDICATIONS  No current outpatient medications    ALLERGIES  Allergies   Allergen Reactions   • Macrodantin [Nitrofurantoin]      High fever       PHYSICAL EXAM  VITAL SIGNS: /98   Pulse 100   Temp 36.5 °C (97.7 °F) (Temporal)   Resp 18   Ht 1.626 m (5' 4\")   Wt 58.4 kg (128 lb 12 oz)   SpO2 95%   BMI 22.10 kg/m²     Constitutional: Well developed, Well nourished, moderate distress.   HENT: Normocephalic, Atraumatic, TMs are clear bilaterally, wearing a mask.   Eyes: Conjunctiva normal, No discharge.   Neck: Supple, No stridor, no meningismus   Cardiovascular: Normal heart rate, Normal rhythm, No murmurs, equal pulses.   Pulmonary: Normal breath sounds, No respiratory distress, No wheezing, No rales, No rhonchi.  Chest: No chest wall tenderness or deformity.   Abdomen:Soft, No tenderness, No masses, no rebound, no guarding.   : Patient has bilateral plaque like raised erythematous and friable tissue approximately 2 cm wide extending the whole length of her buttocks cleft and onto her labia majora bilaterally.  There appears to be possibly a secondary yeast infection on top of this.  This is exquisitely tender to touch.  To the point that I was barely able to get swabs.  Unable to perform any rectal exam secondary patient discomfort  Back: No CVA tenderness.   Musculoskeletal: No major deformities noted, No tenderness.   Skin: Warm, Dry, No erythema, No rash.   Neurologic: Alert & oriented x 3, Normal motor function,  No focal deficits noted.   Psychiatric: Affect normal, Judgment normal, Mood normal.       LABS  Results for orders placed or performed during the hospital encounter of 09/21/20   CBC WITH DIFFERENTIAL   Result " Value Ref Range    WBC 7.1 4.8 - 10.8 K/uL    RBC 4.72 4.20 - 5.40 M/uL    Hemoglobin 15.3 12.0 - 16.0 g/dL    Hematocrit 47.1 (H) 37.0 - 47.0 %    MCV 99.8 (H) 81.4 - 97.8 fL    MCH 32.4 27.0 - 33.0 pg    MCHC 32.5 (L) 33.6 - 35.0 g/dL    RDW 55.9 (H) 35.9 - 50.0 fL    Platelet Count 203 164 - 446 K/uL    MPV 9.5 9.0 - 12.9 fL    Neutrophils-Polys 64.30 44.00 - 72.00 %    Lymphocytes 20.70 (L) 22.00 - 41.00 %    Monocytes 6.50 0.00 - 13.40 %    Eosinophils 6.80 0.00 - 6.90 %    Basophils 1.10 0.00 - 1.80 %    Immature Granulocytes 0.60 0.00 - 0.90 %    Nucleated RBC 0.00 /100 WBC    Neutrophils (Absolute) 4.56 2.00 - 7.15 K/uL    Lymphs (Absolute) 1.47 1.00 - 4.80 K/uL    Monos (Absolute) 0.46 0.00 - 0.85 K/uL    Eos (Absolute) 0.48 0.00 - 0.51 K/uL    Baso (Absolute) 0.08 0.00 - 0.12 K/uL    Immature Granulocytes (abs) 0.04 0.00 - 0.11 K/uL    NRBC (Absolute) 0.00 K/uL   BASIC METABOLIC PANEL   Result Value Ref Range    Sodium 138 135 - 145 mmol/L    Potassium 4.4 3.6 - 5.5 mmol/L    Chloride 106 96 - 112 mmol/L    Co2 16 (L) 20 - 33 mmol/L    Glucose 78 65 - 99 mg/dL    Bun 9 8 - 22 mg/dL    Creatinine 0.54 0.50 - 1.40 mg/dL    Calcium 8.4 (L) 8.5 - 10.5 mg/dL    Anion Gap 16.0 7.0 - 16.0   HIV AG/AB COMBO ASSAY SCREENING   Result Value Ref Range    HIV Ag/Ab Combo Assay Non-Reactive Non Reactive   ESTIMATED GFR   Result Value Ref Range    GFR If African American >60 >60 mL/min/1.73 m 2    GFR If Non African American >60 >60 mL/min/1.73 m 2       All labs reviewed by me.    CT-ABDOMEN-PELVIS WITH   Final Result      1.  No acute abdominal or pelvic findings.   2.  Colonic diverticulosis, mainly in the sigmoid.   3.  Moderately increased colonic fecal material consistent with constipation.            COURSE & MEDICAL DECISION MAKING  Pertinent Labs & Imaging studies reviewed. (See chart for details)    5:19 PM - Patient seen and examined at bedside. Patient will be treated with Morphine 4 mg injection. Ordered CBC  w diff and BMP to evaluate her symptoms. The differential diagnoses include but are not limited to: Zoster, Herpes Simplex     6:40 PM - Ordered HIV AG/AB Assay Screening, HSV 1/2 by PCR, and Culture Wound w Gram Stain for further evaluation. Patient will be treated with Zovirax 800 mg tab.     7:15 PM - Pelvic exam performed by me with female nurse chaperone present at this time.     7:30 PM - Paged Hospitalist. Patient will be treated with morphine 4 mg injection and lidocaine-prilocaine 2.5% cream.    7:41 PM - I discussed the patient's case and the above findings with Dr. Sangita Guevara (Hospitalist) recommends that I consult with either GYN or surgery and have them arrange a biopsy.     7:45 PM - Paged Dr. Love (GYN).    7:50 PM - I discussed the patient's case and the above findings with Dr. Love (GYN) who will come down to evaluate the patient. She does not think that it is Herpes. She believes that it may be cancer or a yeast infection.    8:10 PM - I discussed the patient's case and the above findings with Dr. MANDO Lazar.     8:21 PM - Called Surgery. Patient will be treated with Mycostatin powder    8:25 PM - I discussed the patient's case and the above findings with Dr. Tuttle (Surgery) who will consult on the patient for a possible biopsy. He wants the patient to get a CT-Abdomen and Pelvis and to be kept NPO over night.       Medical Decision Making: This point time I am concerned that the patient may have a cancer.  This does not appear to be herpes given the raised nature and friable tissue.  It is very demarcated I do not think this is a cellulitis.  At this point time patient is having significant pain with any movement I therefore feel that the patient will need to be admitted for pain control and possible biopsies as she does not have any adequate follow-up      DISPOSITION:  Patient will be hospitalized by [facility] in guarded condition.       FINAL IMPRESSION  1. Lesion of perianal area    2.  Inguinal pain, unspecified laterality          Gaston MOYER (Scribe), am scribing for, and in the presence of, Jayme Wayne M.D.    Electronically signed by: Gaston Singh (Mylene), 9/21/2020    Jayme MOYER M.D. personally performed the services described in this documentation, as scribed by Gaston Singh in my presence, and it is both accurate and complete. C.    The note accurately reflects work and decisions made by me.  Jayme Wayne M.D.  9/22/2020  12:13 AM

## 2020-09-22 NOTE — PROGRESS NOTES
While updating pt on care of plan, pt stated she is hard of hearing and was unable to hear this nurse. Upon request of pt, louder voice was used, pt then stated to stop yelling. Informed pt she was not yelled at and apologized for feeling pt was yelled at .

## 2020-09-22 NOTE — ASSESSMENT & PLAN NOTE
Patient had skin lesion on left axilla, however patient refused examination during encounter.    -Continue to monitor.

## 2020-09-22 NOTE — PROGRESS NOTES
"Admit profile and assessment completed.  Pt A&Ox4, labile, anxious, loud, and tearful.  Pt uncooperative with assessment. Pt reported 9/10 rectum pain, medicated per MAR. Pt stated she did not remember this RN giving pain medication to her shortly after administration. Pt refused 2 nurse skin check. Pt quickly showed this RN her rectum, yelling, \"Make it quick, this is all you get\". Large red raised plaque surrounding rectum, noted. POC discussed, communication board updated. Bed in locked, lowest position. Call light and belongings within reach. Needs met, will continue to monitor.   "

## 2020-09-22 NOTE — PROGRESS NOTES
"This RN smelled cigarette smoke in the patient's room. Pt has threatened several times tonight, \"to find a way to smoke\". Cigarette smoke also smelt in the bathroom and siva found in the bathroom sink by RN. Security notified.   "

## 2020-09-23 NOTE — DISCHARGE SUMMARY
Discharge Summary    Date of Admission: 9/21/2020  Date of Discharge: 9/22/2020  5:59 PM  Discharging Attending:   Discharging Senior Resident:     CHIEF COMPLAINT ON ADMISSION  Chief Complaint   Patient presents with   • Rectal Bleeding     Rectal Herpes   • Hearing Loss     in right ear       Reason for Admission  Perianal lesions    Admission Date  9/21/2020    CODE STATUS  Prior    HPI & HOSPITAL COURSE    Kayleen is a 65 y.o. female homeless female presented with approximately 3-5 week complaint of new onset painful perineal lesions. Patient that 5 weeks ago she had an episode of diarrhea, which resolved on its own, but then she started developing painful raised perineal lesions, which have then continued to get worse. She informed she went to [facility] emergency room 2-3 weeks ago and diagnosed with herpetic outbreak, was given acyclovir, but was unable to afford the medication. Denies any previous history of similar symptoms with last reported sexually active more than a year ago. Initially suspicion was for Malignancy and underwent incisional biopsy of lesion by surgery with susbequent testing for STD's with syphilis turning out to be positive. She was treated empirically for chlamydia/gonorrhea with one time dose of ceftriaxone/azithromycin along with treatment of syphilis with Penicillin one time dose of 2.4 million units. She is advised to followup at [facility] STD clinic considering her current social/financial situation and community [facility] to establish with PCP and was discharged on Tylenol. [facility] department will be notified with regards to her positive syphilis status.      Therefore, she is discharged in good and stable condition to home with close outpatient follow-up.    The patient met 2-midnight criteria for an inpatient stay at the time of discharge.    PHYSICAL EXAM ON DISCHARGE  Temp:  [36.1 °C (96.9 °F)-37.2 °C (99 °F)] 36.2 °C (97.1 °F)  Pulse:  [68-89]  71  Resp:  [12-20] 17  BP: ()/() 110/53  SpO2:  [92 %-100 %] 92 %    Physical Exam    Discharge Date  9/22/2020    FOLLOW UP ITEMS POST DISCHARGE  - Follow up with PCP  - Follow up on your appointment at [facility]    DISCHARGE DIAGNOSES  Active Problems:    Perineal mass in female POA: Unknown    Intractable pain POA: Yes    Skin lesion of right lower limb POA: Unknown    Constipation POA: Unknown    Elevated MCV POA: Unknown  Resolved Problems:    * No resolved hospital problems. *      FOLLOW UP  No future appointments.  [address] [address]  [phone]  Schedule an appointment as soon as possible for a visit  Please call to schedule with the STD Clinic at [facility]. Thank you!     [address] [id number] [phone]  Schedule an appointment as soon as possible for a visit  Please call to establish with a Primary Care Physicain and schedule your hospital follow up. Thank you.      MEDICATIONS ON DISCHARGE     Medication List      START taking these medications      Instructions   acetaminophen 500 MG Tabs  Commonly known as: TYLENOL   Take 1 Tab by mouth every 6 hours as needed.  Dose: 500 mg            Allergies  Allergies   Allergen Reactions   • Macrodantin [Nitrofurantoin]      High fever       DIET  No orders of the defined types were placed in this encounter.      ACTIVITY  As tolerated.  Weight bearing as tolerated    CONSULTATIONS  None    PROCEDURES  Incisional biopsy of humaira-anal lesions

## 2020-09-25 ENCOUNTER — HOSPITAL ENCOUNTER (EMERGENCY)
Dept: HOSPITAL 8 - ED | Age: 65
Discharge: HOME | End: 2020-09-25
Payer: MEDICAID

## 2020-09-25 VITALS — HEIGHT: 64 IN | BODY MASS INDEX: 22.66 KG/M2 | WEIGHT: 132.72 LBS

## 2020-09-25 VITALS — DIASTOLIC BLOOD PRESSURE: 113 MMHG | SYSTOLIC BLOOD PRESSURE: 189 MMHG

## 2020-09-25 DIAGNOSIS — Z48.01: Primary | ICD-10-CM

## 2020-09-25 DIAGNOSIS — F17.200: ICD-10-CM

## 2020-09-25 LAB
BACTERIA WND AEROBE CULT: ABNORMAL
GRAM STN SPEC: ABNORMAL
SIGNIFICANT IND 70042: ABNORMAL
SITE SITE: ABNORMAL
SOURCE SOURCE: ABNORMAL

## 2020-09-25 PROCEDURE — 99281 EMR DPT VST MAYX REQ PHY/QHP: CPT

## 2020-10-03 NOTE — NUR
PT ANXIOUS TO LEAVE TO MAKE IT TO THE SHELTER IN TIME FOR DINNER. DISCUSSED 
WITH PT, NEW BP MEDICATION AND NEED TO TAKE HER BP IN APPROX ONE HR, PT STATES 
UNDERSTANDING AND STATES SHE IS GOING TO PURCHACE A BP CUFF AND MONITOR HER BP. 
PT MEDICATION PER MAR. WILL PROCEED WITH KISHORE

## 2021-08-01 ENCOUNTER — APPOINTMENT (OUTPATIENT)
Dept: RADIOLOGY | Facility: MEDICAL CENTER | Age: 66
End: 2021-08-01
Attending: STUDENT IN AN ORGANIZED HEALTH CARE EDUCATION/TRAINING PROGRAM
Payer: MEDICARE

## 2021-08-22 ENCOUNTER — APPOINTMENT (OUTPATIENT)
Dept: RADIOLOGY | Facility: MEDICAL CENTER | Age: 66
End: 2021-08-22
Attending: EMERGENCY MEDICINE
Payer: MEDICARE

## 2021-08-22 ENCOUNTER — HOSPITAL ENCOUNTER (OUTPATIENT)
Facility: MEDICAL CENTER | Age: 66
End: 2021-08-23
Attending: EMERGENCY MEDICINE | Admitting: STUDENT IN AN ORGANIZED HEALTH CARE EDUCATION/TRAINING PROGRAM
Payer: MEDICARE

## 2021-08-22 DIAGNOSIS — M48.56XA NONTRAUMATIC COMPRESSION FRACTURE OF L3 VERTEBRA, INITIAL ENCOUNTER (HCC): ICD-10-CM

## 2021-08-22 DIAGNOSIS — M48.56XA NONTRAUMATIC COMPRESSION FRACTURE OF L2 VERTEBRA, INITIAL ENCOUNTER (HCC): ICD-10-CM

## 2021-08-22 DIAGNOSIS — M48.56XA NONTRAUMATIC COMPRESSION FRACTURE OF L4 VERTEBRA, INITIAL ENCOUNTER (HCC): ICD-10-CM

## 2021-08-22 PROCEDURE — 700111 HCHG RX REV CODE 636 W/ 250 OVERRIDE (IP): Performed by: EMERGENCY MEDICINE

## 2021-08-22 PROCEDURE — 96372 THER/PROPH/DIAG INJ SC/IM: CPT

## 2021-08-22 PROCEDURE — 72131 CT LUMBAR SPINE W/O DYE: CPT

## 2021-08-22 PROCEDURE — 99285 EMERGENCY DEPT VISIT HI MDM: CPT

## 2021-08-22 RX ORDER — HYDROMORPHONE HYDROCHLORIDE 1 MG/ML
1 INJECTION, SOLUTION INTRAMUSCULAR; INTRAVENOUS; SUBCUTANEOUS ONCE
Status: COMPLETED | OUTPATIENT
Start: 2021-08-22 | End: 2021-08-22

## 2021-08-22 RX ORDER — KETOROLAC TROMETHAMINE 30 MG/ML
15 INJECTION, SOLUTION INTRAMUSCULAR; INTRAVENOUS ONCE
Status: COMPLETED | OUTPATIENT
Start: 2021-08-22 | End: 2021-08-22

## 2021-08-22 RX ADMIN — KETOROLAC TROMETHAMINE 15 MG: 30 INJECTION, SOLUTION INTRAMUSCULAR at 23:19

## 2021-08-22 RX ADMIN — HYDROMORPHONE HYDROCHLORIDE 1 MG: 1 INJECTION, SOLUTION INTRAMUSCULAR; INTRAVENOUS; SUBCUTANEOUS at 23:18

## 2021-08-22 ASSESSMENT — FIBROSIS 4 INDEX: FIB4 SCORE: 1.43

## 2021-08-23 ENCOUNTER — APPOINTMENT (OUTPATIENT)
Dept: RADIOLOGY | Facility: MEDICAL CENTER | Age: 66
End: 2021-08-23
Attending: STUDENT IN AN ORGANIZED HEALTH CARE EDUCATION/TRAINING PROGRAM
Payer: MEDICARE

## 2021-08-23 ENCOUNTER — APPOINTMENT (OUTPATIENT)
Dept: RADIOLOGY | Facility: MEDICAL CENTER | Age: 66
End: 2021-08-23
Attending: NURSE PRACTITIONER
Payer: MEDICARE

## 2021-08-23 VITALS
HEART RATE: 86 BPM | OXYGEN SATURATION: 94 % | RESPIRATION RATE: 16 BRPM | BODY MASS INDEX: 22.06 KG/M2 | TEMPERATURE: 98.6 F | DIASTOLIC BLOOD PRESSURE: 91 MMHG | HEIGHT: 64 IN | SYSTOLIC BLOOD PRESSURE: 156 MMHG | WEIGHT: 129.19 LBS

## 2021-08-23 PROBLEM — Z86.19 HISTORY OF SYPHILIS: Chronic | Status: ACTIVE | Noted: 2021-08-23

## 2021-08-23 PROBLEM — E87.6 HYPOKALEMIA: Status: ACTIVE | Noted: 2021-08-23

## 2021-08-23 PROBLEM — S32.000A LUMBAR COMPRESSION FRACTURE (HCC): Status: ACTIVE | Noted: 2021-08-23

## 2021-08-23 PROBLEM — Z53.29 LEFT AGAINST MEDICAL ADVICE: Status: ACTIVE | Noted: 2021-08-23

## 2021-08-23 PROBLEM — C79.9 METASTATIC DISEASE (HCC): Status: ACTIVE | Noted: 2021-08-23

## 2021-08-23 PROBLEM — R74.8 ELEVATED ALKALINE PHOSPHATASE LEVEL: Status: ACTIVE | Noted: 2021-08-23

## 2021-08-23 PROBLEM — R73.09 ELEVATED GLUCOSE LEVEL: Status: ACTIVE | Noted: 2021-08-23

## 2021-08-23 PROBLEM — F17.200 TOBACCO DEPENDENCE: Chronic | Status: ACTIVE | Noted: 2021-08-23

## 2021-08-23 LAB
ALBUMIN SERPL BCP-MCNC: 3.2 G/DL (ref 3.2–4.9)
ALBUMIN/GLOB SERPL: 1 G/DL
ALP SERPL-CCNC: 131 U/L (ref 30–99)
ALT SERPL-CCNC: 32 U/L (ref 2–50)
ANION GAP SERPL CALC-SCNC: 11 MMOL/L (ref 7–16)
AST SERPL-CCNC: 34 U/L (ref 12–45)
BASOPHILS # BLD AUTO: 0.8 % (ref 0–1.8)
BASOPHILS # BLD: 0.04 K/UL (ref 0–0.12)
BILIRUB SERPL-MCNC: 0.2 MG/DL (ref 0.1–1.5)
BUN SERPL-MCNC: 14 MG/DL (ref 8–22)
CALCIUM SERPL-MCNC: 8.5 MG/DL (ref 8.5–10.5)
CHLORIDE SERPL-SCNC: 103 MMOL/L (ref 96–112)
CO2 SERPL-SCNC: 22 MMOL/L (ref 20–33)
CREAT SERPL-MCNC: 0.68 MG/DL (ref 0.5–1.4)
EOSINOPHIL # BLD AUTO: 0.03 K/UL (ref 0–0.51)
EOSINOPHIL NFR BLD: 0.6 % (ref 0–6.9)
ERYTHROCYTE [DISTWIDTH] IN BLOOD BY AUTOMATED COUNT: 49.9 FL (ref 35.9–50)
GLOBULIN SER CALC-MCNC: 3.2 G/DL (ref 1.9–3.5)
GLUCOSE SERPL-MCNC: 128 MG/DL (ref 65–99)
HCT VFR BLD AUTO: 44.6 % (ref 37–47)
HGB BLD-MCNC: 14.4 G/DL (ref 12–16)
IMM GRANULOCYTES # BLD AUTO: 0.03 K/UL (ref 0–0.11)
IMM GRANULOCYTES NFR BLD AUTO: 0.6 % (ref 0–0.9)
LYMPHOCYTES # BLD AUTO: 1.55 K/UL (ref 1–4.8)
LYMPHOCYTES NFR BLD: 30 % (ref 22–41)
MCH RBC QN AUTO: 30.5 PG (ref 27–33)
MCHC RBC AUTO-ENTMCNC: 32.3 G/DL (ref 33.6–35)
MCV RBC AUTO: 94.5 FL (ref 81.4–97.8)
MONOCYTES # BLD AUTO: 0.43 K/UL (ref 0–0.85)
MONOCYTES NFR BLD AUTO: 8.3 % (ref 0–13.4)
NEUTROPHILS # BLD AUTO: 3.08 K/UL (ref 2–7.15)
NEUTROPHILS NFR BLD: 59.7 % (ref 44–72)
NRBC # BLD AUTO: 0 K/UL
NRBC BLD-RTO: 0 /100 WBC
PLATELET # BLD AUTO: 219 K/UL (ref 164–446)
PMV BLD AUTO: 9.7 FL (ref 9–12.9)
POTASSIUM SERPL-SCNC: 3.3 MMOL/L (ref 3.6–5.5)
PROT SERPL-MCNC: 6.4 G/DL (ref 6–8.2)
RBC # BLD AUTO: 4.72 M/UL (ref 4.2–5.4)
SODIUM SERPL-SCNC: 136 MMOL/L (ref 135–145)
WBC # BLD AUTO: 5.2 K/UL (ref 4.8–10.8)

## 2021-08-23 PROCEDURE — A9576 INJ PROHANCE MULTIPACK: HCPCS | Performed by: NURSE PRACTITIONER

## 2021-08-23 PROCEDURE — 99236 HOSP IP/OBS SAME DATE HI 85: CPT | Performed by: STUDENT IN AN ORGANIZED HEALTH CARE EDUCATION/TRAINING PROGRAM

## 2021-08-23 PROCEDURE — 80053 COMPREHEN METABOLIC PANEL: CPT

## 2021-08-23 PROCEDURE — L0458 TLSO 2MOD SYMPHIS-XIPHO PRE: HCPCS

## 2021-08-23 PROCEDURE — 85025 COMPLETE CBC W/AUTO DIFF WBC: CPT

## 2021-08-23 PROCEDURE — 72158 MRI LUMBAR SPINE W/O & W/DYE: CPT

## 2021-08-23 PROCEDURE — 96374 THER/PROPH/DIAG INJ IV PUSH: CPT

## 2021-08-23 PROCEDURE — 71260 CT THORAX DX C+: CPT | Mod: ME

## 2021-08-23 PROCEDURE — L0150 CERV SEMI-RIG ADJ MOLDED CHN: HCPCS

## 2021-08-23 PROCEDURE — 700117 HCHG RX CONTRAST REV CODE 255: Performed by: STUDENT IN AN ORGANIZED HEALTH CARE EDUCATION/TRAINING PROGRAM

## 2021-08-23 PROCEDURE — G0378 HOSPITAL OBSERVATION PER HR: HCPCS

## 2021-08-23 PROCEDURE — 700111 HCHG RX REV CODE 636 W/ 250 OVERRIDE (IP): Performed by: STUDENT IN AN ORGANIZED HEALTH CARE EDUCATION/TRAINING PROGRAM

## 2021-08-23 PROCEDURE — A9270 NON-COVERED ITEM OR SERVICE: HCPCS | Performed by: STUDENT IN AN ORGANIZED HEALTH CARE EDUCATION/TRAINING PROGRAM

## 2021-08-23 PROCEDURE — 96376 TX/PRO/DX INJ SAME DRUG ADON: CPT

## 2021-08-23 PROCEDURE — 700102 HCHG RX REV CODE 250 W/ 637 OVERRIDE(OP): Performed by: STUDENT IN AN ORGANIZED HEALTH CARE EDUCATION/TRAINING PROGRAM

## 2021-08-23 PROCEDURE — 700117 HCHG RX CONTRAST REV CODE 255: Performed by: NURSE PRACTITIONER

## 2021-08-23 RX ORDER — ACETAMINOPHEN 325 MG/1
650 TABLET ORAL EVERY 6 HOURS PRN
Status: DISCONTINUED | OUTPATIENT
Start: 2021-08-23 | End: 2021-08-23 | Stop reason: HOSPADM

## 2021-08-23 RX ORDER — HEPARIN SODIUM 5000 [USP'U]/ML
5000 INJECTION, SOLUTION INTRAVENOUS; SUBCUTANEOUS EVERY 8 HOURS
Status: DISCONTINUED | OUTPATIENT
Start: 2021-08-23 | End: 2021-08-23 | Stop reason: HOSPADM

## 2021-08-23 RX ORDER — HYDROMORPHONE HYDROCHLORIDE 1 MG/ML
1 INJECTION, SOLUTION INTRAMUSCULAR; INTRAVENOUS; SUBCUTANEOUS EVERY 4 HOURS PRN
Status: DISCONTINUED | OUTPATIENT
Start: 2021-08-23 | End: 2021-08-23

## 2021-08-23 RX ORDER — LORAZEPAM 1 MG/1
0.5 TABLET ORAL ONCE
Status: COMPLETED | OUTPATIENT
Start: 2021-08-23 | End: 2021-08-23

## 2021-08-23 RX ORDER — HYDROMORPHONE HYDROCHLORIDE 1 MG/ML
0.5 INJECTION, SOLUTION INTRAMUSCULAR; INTRAVENOUS; SUBCUTANEOUS
Status: DISCONTINUED | OUTPATIENT
Start: 2021-08-23 | End: 2021-08-23 | Stop reason: HOSPADM

## 2021-08-23 RX ORDER — OXYCODONE HYDROCHLORIDE 10 MG/1
10 TABLET ORAL
Status: DISCONTINUED | OUTPATIENT
Start: 2021-08-23 | End: 2021-08-23 | Stop reason: HOSPADM

## 2021-08-23 RX ORDER — OXYCODONE HYDROCHLORIDE 5 MG/1
5 TABLET ORAL
Status: DISCONTINUED | OUTPATIENT
Start: 2021-08-23 | End: 2021-08-23 | Stop reason: HOSPADM

## 2021-08-23 RX ORDER — POTASSIUM CHLORIDE 20 MEQ/1
40 TABLET, EXTENDED RELEASE ORAL ONCE
Status: COMPLETED | OUTPATIENT
Start: 2021-08-23 | End: 2021-08-23

## 2021-08-23 RX ORDER — NICOTINE 21 MG/24HR
1 PATCH, TRANSDERMAL 24 HOURS TRANSDERMAL DAILY
Status: DISCONTINUED | OUTPATIENT
Start: 2021-08-23 | End: 2021-08-23 | Stop reason: HOSPADM

## 2021-08-23 RX ADMIN — IOHEXOL 25 ML: 240 INJECTION, SOLUTION INTRATHECAL; INTRAVASCULAR; INTRAVENOUS; ORAL at 04:19

## 2021-08-23 RX ADMIN — IOHEXOL 100 ML: 350 INJECTION, SOLUTION INTRAVENOUS at 04:18

## 2021-08-23 RX ADMIN — NICOTINE 21 MG: 21 PATCH TRANSDERMAL at 04:04

## 2021-08-23 RX ADMIN — POTASSIUM CHLORIDE 40 MEQ: 1500 TABLET, EXTENDED RELEASE ORAL at 06:34

## 2021-08-23 RX ADMIN — HYDROMORPHONE HYDROCHLORIDE 1 MG: 1 INJECTION, SOLUTION INTRAMUSCULAR; INTRAVENOUS; SUBCUTANEOUS at 06:38

## 2021-08-23 RX ADMIN — LORAZEPAM 0.5 MG: 1 TABLET ORAL at 04:03

## 2021-08-23 RX ADMIN — GADOTERIDOL 10 ML: 279.3 INJECTION, SOLUTION INTRAVENOUS at 11:26

## 2021-08-23 RX ADMIN — NICOTINE POLACRILEX 2 MG: 2 GUM, CHEWING BUCCAL at 04:20

## 2021-08-23 RX ADMIN — HYDROMORPHONE HYDROCHLORIDE 1 MG: 1 INJECTION, SOLUTION INTRAMUSCULAR; INTRAVENOUS; SUBCUTANEOUS at 12:42

## 2021-08-23 ASSESSMENT — ENCOUNTER SYMPTOMS
SHORTNESS OF BREATH: 0
EYES NEGATIVE: 1
NEUROLOGICAL NEGATIVE: 1
COUGH: 0
PALPITATIONS: 0
PSYCHIATRIC NEGATIVE: 1
HEADACHES: 0
GASTROINTESTINAL NEGATIVE: 1
BLURRED VISION: 0
WEAKNESS: 1
FEVER: 0
CARDIOVASCULAR NEGATIVE: 1
MYALGIAS: 0
BACK PAIN: 1
HEMOPTYSIS: 0
BRUISES/BLEEDS EASILY: 0
ABDOMINAL PAIN: 0
RESPIRATORY NEGATIVE: 1
VOMITING: 0
CHILLS: 0
DIZZINESS: 0

## 2021-08-23 ASSESSMENT — PAIN DESCRIPTION - PAIN TYPE
TYPE: ACUTE PAIN

## 2021-08-23 ASSESSMENT — FIBROSIS 4 INDEX: FIB4 SCORE: 1.78

## 2021-08-23 NOTE — ED PROVIDER NOTES
"ED Provider Note    Scribed for Delaney Gray M.D. by Herman Hidalgo. 8/22/2021  10:35 PM    Means of arrival: Walk-in  History obtained from: Patient  History limited by: None      CHIEF COMPLAINT  Chief Complaint   Patient presents with   • Low Back Pain     Pt reports having lower back pain for about a month and unable to manage the pain anymore.         DAVID Alfredo is a 66 y.o. female who presents to the Emergency Department for worsening lower back pain. Patient repots having low back pain for at least a month which has been slowly worsening. Before her current pain started she notes that she fractured one of her thoracic vertebra \"from heavy lifting/twisting\" which healed. She otherwise denies any bladder/bowel incontinence, weakness, numbness, or vomiting. Patient denies any new trauma to her back. She is non-domicile and currently staying at the shelter.  She has been taking ibuprofen prior to coming in with minimal relief.    REVIEW OF SYSTEMS  Pertinent positive include lower back pain. Pertinent negative include bladder/bowel incontinence, weakness, numbness, or vomiting. All other systems reviewed and are negative.      PAST MEDICAL HISTORY       SOCIAL HISTORY  Social History     Tobacco Use   • Smoking status: Current Every Day Smoker     Packs/day: 0.50     Types: Cigarettes   • Smokeless tobacco: Never Used   Vaping Use   • Vaping Use: Never used   Substance and Sexual Activity   • Alcohol use: Never   • Drug use: Never   • Sexual activity: Not on file       SURGICAL HISTORY   has a past surgical history that includes exam under anesthesia (9/22/2020) and rectal biopsy (N/A, 9/22/2020).    CURRENT MEDICATIONS  Current Outpatient Medications   Medication Instructions   • acetaminophen (TYLENOL) 500 mg, Oral, EVERY 6 HOURS PRN       ALLERGIES  Allergies   Allergen Reactions   • Macrodantin [Nitrofurantoin]      High fever       PHYSICAL EXAM   VITAL SIGNS: BP (!) 161/85   Pulse 99   Temp " "37.6 °C (99.7 °F) (Temporal)   Resp 18   Ht 1.626 m (5' 4\")   Wt 54.4 kg (120 lb)   SpO2 97%   BMI 20.60 kg/m²    Constitutional: Non-toxic appearing, Alert in no apparent distress.  HENT: Normocephalic, Atraumatic. Bilateral external ears normal. Nose normal.  Moist mucous membranes.  Oropharynx clear.  Eyes: Pupils are equal and reactive. Conjunctiva normal.   Neck: Supple, full range of motion  Heart: Regular rate and rhythm.  No murmurs.    Lungs: No respiratory distress, normal work of breathing. Lungs clear to auscultation bilaterally.  Abdomen Soft, no distention.  No tenderness to palpation.  Musculoskeletal: Mild midline and bilateral paraspinal lumbar tenderness to palpation. Atraumatic. No obvious deformities noted.  No lower extremity edema.  Skin: Warm, Dry.  No erythema, No rash.   Neurologic: Alert and oriented x3. Moving all extremities spontaneously without focal deficits.  Strength 5/5 bilateral lower extremities.  Sensation intact.  Normal gait.  Psychiatric: Affect normal, Mood normal, Appears appropriate and not intoxicated.      DIAGNOSTIC STUDIES    RADIOLOGY  Personally reviewed by me  CTMINI W/O PLUS RECONS   Final Result      1.  Superior endplate compression fracture involving L2 resulting in 50% height loss. This is likely subacute in nature.      2.  Superior endplate compression fracture involving L4 which results in 30% height loss. This is likely subacute in nature.      3.  Superior endplate compression fracture involving L3 resulting in 20% height loss. Likely subacute or chronic in nature.      4.  Areas of patchy sclerosis involving all lumbar levels as well as a lucent area involving the right ilium. This is suspicious for metastatic disease.      5.  Multilevel degenerative disc disease and facet degeneration.      CT-CHEST,ABDOMEN,PELVIS WITH    (Results Pending)       ED COURSE  Vitals:    08/22/21 1847 08/22/21 1906 08/22/21 2300   BP: (!) 161/85  132/72   Pulse: 99  " "67   Resp: 18  16   Temp: 37.6 °C (99.7 °F)     TempSrc: Temporal     SpO2: 97%  94%   Weight:  54.4 kg (120 lb)    Height:  1.626 m (5' 4\")          Medications administered:  Medications   ketorolac (TORADOL) injection 15 mg (15 mg Intramuscular Given 8/22/21 9459)   HYDROmorphone (Dilaudid) injection 1 mg (1 mg Intramuscular Given 8/22/21 2318)         10:35 PM Patient seen and examined at bedside. The patient presents with lower back pain. Ordered for CT-Lspine w/o to evaluate. Patient will be treated with Toradol 15 mg and Dilaudid 1 mg for her symptoms.       MEDICAL DECISION MAKING  Homeless otherwise healthy patient presents with 1 month history of worsening atraumatic back pain.  She is afebrile with reassuring vital signs on arrival.  She has no red flag signs or symptoms concerning for cauda equina syndrome or epidural abscess.  There is no history of IV drug use.  She is very tender along the midline of her back therefore imaging was performed.  CT shows evidence of 3 separate compression fractures at L2, L3, L4.  There is no signs of cord compromise.  She does have multiple lytic lesions that are concerning for metastatic disease.  The patient will be admitted for further work-up of this.    12:00 AM I discussed the case with Dr. Aguilar, on-call for spine, he recommends TLSO brace.  No need for further intervention.    12:30 AM discussed the case with Dr. Murcia, hospitalist on-call, accepts admission of the patient.  The patient was updated on imaging findings that are concerning for cancer.  She understands plan of care for admission for further work-up.      Disposition: Patient is admitted to Dr. Murcia in stable condition    IMPRESSION  (M48.56XA) Nontraumatic compression fracture of L3 vertebra, initial encounter (HCC)  (M48.56XA) Nontraumatic compression fracture of L2 vertebra, initial encounter (HCC)  (M48.56XA) Nontraumatic compression fracture of L4 vertebra, initial encounter " ([facility])    Results, diagnoses, and treatment options were discussed with the patient and/or family. Patient verbalized understanding of plan of care.    There are no discharge medications for this patient.      IHerman (Scribe), am scribing for, and in the presence of, Delaney Gray M.D..    Electronically signed by: Herman Hidalgo (Scribe), 8/22/2021    IDelaney M.D. personally performed the services described in this documentation, as scribed by Herman Hidalgo in my presence, and it is both accurate and complete.     The note accurately reflects work and decisions made by me.  Delaney Gray M.D.  8/23/2021  1:03 AM

## 2021-08-23 NOTE — ED NOTES
Attending today from [phone] is Dr Nolen. Please contact this physician for questions, orders updates.

## 2021-08-23 NOTE — ED TRIAGE NOTES
"Chief Complaint   Patient presents with   • Low Back Pain     Pt reports having lower back pain for about a month and unable to manage the pain anymore.       Pt to triage from lobby in  with above complaint.      BP (!) 161/85   Pulse 99   Temp 37.6 °C (99.7 °F) (Temporal)   Resp 18   Ht 1.626 m (5' 4\")   Wt 54.4 kg (120 lb)   SpO2 97%   BMI 20.60 kg/m²     "

## 2021-08-23 NOTE — H&P
Hospital Medicine History & Physical Note    Date of Service  8/23/2021    Primary Care Physician  No primary care provider on file.    Consultants  neurosurgery    Specialist Names: Spine     Code Status  Full Code    Chief Complaint  Chief Complaint   Patient presents with   • Low Back Pain     Pt reports having lower back pain for about a month and unable to manage the pain anymore.         History of Presenting Illness  Kayleen Alfredo is a 66 y.o. female who presented 8/22/2021 with worsening lower back pain.  She states that she has had back pain for several months now and has progressively getting worse to the point where she is unable to tolerate it.    In the ED, patient found to have normal vital signs.  CT L-spine shows superior endplate compression fracture involving L2-L3 and L4.  In addition, she has areas of patchy sclerosis involving all lumbar levels as well as a lucent area involving the right ilium.  Suspicious for metastatic disease.    I discussed the plan of care with patient.    Review of Systems  Review of Systems   Constitutional: Positive for malaise/fatigue.   HENT: Negative.    Eyes: Negative.    Respiratory: Negative.    Cardiovascular: Negative.    Gastrointestinal: Negative.    Genitourinary: Negative.    Musculoskeletal: Positive for back pain.   Skin: Negative.    Neurological: Negative.    Endo/Heme/Allergies: Negative.    Psychiatric/Behavioral: Negative.          Past Medical History  No pertinent medical history     Surgical History   has a past surgical history that includes exam under anesthesia (9/22/2020) and rectal biopsy (N/A, 9/22/2020).     Family History     Family history reviewed with patient. There is no family history that is pertinent to the chief complaint.     Social History   reports that she has been smoking cigarettes. She has been smoking about 0.50 packs per day. She has never used smokeless tobacco. She reports that she does not drink alcohol and does not use  drugs.    Allergies  Allergies   Allergen Reactions   • Macrodantin [Nitrofurantoin]      High fever       Medications  None       Physical Exam  Temp:  [37.6 °C (99.7 °F)] 37.6 °C (99.7 °F)  Pulse:  [67-99] 67  Resp:  [16-18] 16  BP: (132-161)/(72-85) 132/72  SpO2:  [94 %-97 %] 94 %    Physical Exam  Constitutional:       Appearance: Normal appearance. She is normal weight.   HENT:      Head: Normocephalic.      Nose: Nose normal.      Mouth/Throat:      Mouth: Mucous membranes are moist.   Eyes:      Pupils: Pupils are equal, round, and reactive to light.   Cardiovascular:      Rate and Rhythm: Normal rate and regular rhythm.   Pulmonary:      Effort: Pulmonary effort is normal.      Breath sounds: Normal breath sounds.   Abdominal:      General: Abdomen is flat. Bowel sounds are normal.      Palpations: Abdomen is soft.   Musculoskeletal:         General: Normal range of motion.      Cervical back: Normal range of motion.      Comments: Pain reproduced upon palpation of lower back   Skin:     General: Skin is warm.   Neurological:      General: No focal deficit present.      Mental Status: She is alert and oriented to person, place, and time. Mental status is at baseline.           Laboratory:          No results for input(s): ALTSGPT, ASTSGOT, ALKPHOSPHAT, TBILIRUBIN, DBILIRUBIN, GAMMAGT, AMYLASE, LIPASE, ALB, PREALBUMIN, GLUCOSE in the last 72 hours.      No results for input(s): NTPROBNP in the last 72 hours.      No results for input(s): TROPONINT in the last 72 hours.    Imaging:  CT-LSPINE W/O PLUS RECONS   Final Result      1.  Superior endplate compression fracture involving L2 resulting in 50% height loss. This is likely subacute in nature.      2.  Superior endplate compression fracture involving L4 which results in 30% height loss. This is likely subacute in nature.      3.  Superior endplate compression fracture involving L3 resulting in 20% height loss. Likely subacute or chronic in nature.      4.   Areas of patchy sclerosis involving all lumbar levels as well as a lucent area involving the right ilium. This is suspicious for metastatic disease.      5.  Multilevel degenerative disc disease and facet degeneration.      CT-CHEST,ABDOMEN,PELVIS WITH    (Results Pending)       no X-Ray or EKG requiring interpretation    Assessment/Plan:  I anticipate this patient will require at least two midnights for appropriate medical management, necessitating inpatient admission.    * Lumbar compression fracture (HCC)  Assessment & Plan  Admit patient to neurosurgery floor   Noted to have lumbar compression fractures from L2-L4  Spine surgery, recommend TSLO brace  PT OT   Dilaudid 1 mg q4h prn           Metastatic disease (HCC)  Assessment & Plan  No previous diagnosis of malignancy  CAT scan lumbar spine showing suspicion for metastatic disease  CT chest and abdomen with contrast      VTE prophylaxis: heparin ppx

## 2021-08-23 NOTE — ASSESSMENT & PLAN NOTE
No previous diagnosis of malignancy  CAT scan lumbar spine showing suspicion for metastatic disease  CT chest and abdomen with contrast

## 2021-08-23 NOTE — PROGRESS NOTES
Hospital Medicine Daily Progress Note    Date of Service  8/23/2021    Patient seen by Dr. Murcia and Dr. Nolen today.   Taking over patient's care as patient was moved to [facility].    MRI and CT scan reviewed.  Patient pending IR deep bone biopsy for 8/24/21.  Pain regimen ordered  Regular diet and NPO at midnight ordered.  On heparin for DVT prophylaxis    Spoke with radiology NP, IR Osteocool RF ablation + BX on L2 and L4 with GA scheduled for 8/24.

## 2021-08-23 NOTE — PROGRESS NOTES
Patient to MRI via gurlenin. Report called to AIDA Tavarez on T3. Patient will go to T3 after MRI.

## 2021-08-23 NOTE — PROGRESS NOTES
Neurosurgery Progress Note    Subjective:  Pt seen in conjunction with Dr. Aguilar  LBP, no radiculopathy    Exam:  A/Ox4  BLE IP/Quad 4/5 due to pain, TA/GS 5/5    BP  Min: 132/72  Max: 168/81  Pulse  Av.3  Min: 67  Max: 99  Resp  Av.3  Min: 16  Max: 18  Temp  Av.6 °C (99.7 °F)  Min: 37.6 °C (99.7 °F)  Max: 37.6 °C (99.7 °F)  SpO2  Av.3 %  Min: 91 %  Max: 97 %    No data recorded    Recent Labs     21  0125   WBC 5.2   RBC 4.72   HEMOGLOBIN 14.4   HEMATOCRIT 44.6   MCV 94.5   MCH 30.5   MCHC 32.3*   RDW 49.9   PLATELETCT 219   MPV 9.7     Recent Labs     21  0125   SODIUM 136   POTASSIUM 3.3*   CHLORIDE 103   CO2 22   GLUCOSE 128*   BUN 14   CREATININE 0.68   CALCIUM 8.5               Intake/Output     None          No intake or output data in the 24 hours ending 21 0855         • heparin  5,000 Units Q8HRS   • acetaminophen  650 mg Q6HRS PRN   • HYDROmorphone  1 mg Q4HRS PRN   • nicotine  1 Patch DAILY   • nicotine polacrilex  2 mg Q2HRS PRN       Assessment and Plan:  Hospital day #2 L2-4 compression fracture with lesion  POD #n/a  Prophylactic anticoagulation: no         Start date/time: n/a    BLP x 1month and no known history of cancer  Neuro intact  Mobilize with TLSO  Pain control  Lesions in lumbar vertebrae: evaluate with lumbar MRI with and without contrast  Will continue to follow

## 2021-08-23 NOTE — PROGRESS NOTES
Patient seen and examined, admitted after midnight for intractable back pain found to have lumbar vertebrae fracture involving L2, L3, L4 also foud to have metastatic lesion   Neurosurgery rec TLSO brace.   CT of the C/A/P showing lung masses which can be primary with mets to the lumbar region. IR biopsy of the lumbar lesion has been ordered.  Patient continues to have severe back pain requiring  IV narcotics to keep it under control.  PT/Ot eval   For more info please refer to Dr. Murcia H&P.

## 2021-08-23 NOTE — CONSULTS
"Radiology Consult  Author: KAELA He Date & Time created: 8/23/2021  2:55 PM   Date of admission  8/22/2021  Note to reader: this note follows the APSO format rather than the historical SOAP format. Assessment and plan located at the top of the note for ease of use.    Chief Complaint  66 y.o. female admitted 8/22/2021 with   Chief Complaint   Patient presents with   • Low Back Pain     Pt reports having lower back pain for about a month and unable to manage the pain anymore.       HPI  Kayleen Alfredo is a 66 y.o. female who presents to the Emergency Department for worsening lower back pain. Patient repots having low back pain for at least a month which has been slowly worsening. Before her current pain started she notes that she fractured one of her thoracic vertebra \"from heavy lifting/twisting\" which healed. She otherwise denies any bladder/bowel incontinence, weakness, numbness, or vomiting. Patient denies any new trauma to her back. She is non-domicile and [facility] the shelter.  She has been taking ibuprofen prior to coming in with minimal relief. Dilaudid 1 mg q4h prn         Assessment/Plan  Interval History   Principal Problem:    Lumbar compression fracture (HCC)  Active Problems:    Metastatic disease (HCC)    Plan IR  - IR Osteocool RF ablation + BX on L2 and L4 with GA scheduled for 8/24  - NPO at midnight  - Hold blood thinners/ ASA   - Went over the risks, benefits, and alternatives of aforementioned procedures were discussed with patient in detail before proceeding.  Patient was given opportunities to ask questions and discuss other options.  Risks including but not limited to perforation, infection, bleeding, missed lesion(s), possible need for surgery(ies) and/or interventional radiology, possible need for repeat procedure(s) and/or additional testing, hospitalization possibly prolonged,  pulmonary event, aspiration, hypoxia,  medication (s) and/or anesthesia reaction(s), " indefinite diagnosis, discomfort/pain, unsuccessful and/or incomplete procedure, ineffective therapy, persistent symptoms, damage to adjacent organs/structure spinal cord or nerve roots and/or vascular, damage to surrounding tissue through iatrogenic injury, and other adverse event(s) possibly life-threatening.  Interactive discussion was undertaken with Layman's terms.  I answered questions in full and to satisfaction.  Patient stated understanding and acceptance of these risks, and wished to proceed.     [id number]  IR:            Review of Systems  Physical Exam   Review of Systems   Constitutional: Negative for chills and fever.   HENT: Negative for hearing loss.    Eyes: Negative for blurred vision.   Respiratory: Negative for cough, hemoptysis and shortness of breath.    Cardiovascular: Negative for chest pain and palpitations.   Gastrointestinal: Negative for abdominal pain and vomiting.   Genitourinary: Negative for dysuria.   Musculoskeletal: Positive for back pain. Negative for myalgias.   Skin: Negative for rash.   Neurological: Positive for weakness. Negative for dizziness and headaches.   Endo/Heme/Allergies: Does not bruise/bleed easily.   Psychiatric/Behavioral: Negative for suicidal ideas.      Vitals:    08/23/21 1200   BP: 143/89   Pulse: 84   Resp: 16   Temp: 37.3 °C (99.1 °F)   SpO2: 95%      Physical Exam  Constitutional:       Appearance: Normal appearance.   HENT:      Head: Normocephalic.      Nose: Nose normal.      Mouth/Throat:      Mouth: Mucous membranes are moist.   Eyes:      Pupils: Pupils are equal, round, and reactive to light.   Cardiovascular:      Rate and Rhythm: Normal rate.   Pulmonary:      Effort: Pulmonary effort is normal. No respiratory distress.   Abdominal:      Palpations: Abdomen is soft.      Tenderness: There is no abdominal tenderness.   Musculoskeletal:         General: Tenderness present. No deformity.      Cervical back: Normal range of motion.      Thoracic back:  Tenderness present.      Lumbar back: Tenderness present.      Comments: Reproduced of pain upon palpation of lumbar     Skin:     General: Skin is warm and dry.      Capillary Refill: Capillary refill takes less than 2 seconds.      Coloration: Skin is not jaundiced or pale.   Neurological:      General: No focal deficit present.      Mental Status: She is alert.      Motor: No weakness.   Psychiatric:         Mood and Affect: Mood normal.         Behavior: Behavior normal.             Labs    Recent Labs     08/23/21  0125   WBC 5.2   RBC 4.72   HEMOGLOBIN 14.4   HEMATOCRIT 44.6   MCV 94.5   MCH 30.5   MCHC 32.3*   RDW 49.9   PLATELETCT 219   MPV 9.7     Recent Labs     08/23/21  0125   SODIUM 136   POTASSIUM 3.3*   CHLORIDE 103   CO2 22   GLUCOSE 128*   BUN 14   CREATININE 0.68   CALCIUM 8.5     MR-LUMBAR SPINE-WITH & W/O   Final Result      1.  There is acute/subacute compression fracture of L2 vertebral body. There is an approximately 50% loss of its height.   2.  There is also subacute fracture along the superior endplate of L4 causing an approximately 30% height loss.   3.  Subacute compression fracture along the superior endplate of L3 vertebral body causing an approximately 20% height loss.   4.  There is an approximately 10 mm sized peripherally enhancing lesion in the L4 vertebral body. There are also focal T2 hyperintense lesions in the iliac bones.   5.  These findings are concerning for focal osseous lesions with pathological fractures.   6.  Degenerative disease as described above.      CT-CHEST,ABDOMEN,PELVIS WITH   Final Result      1.  Bilateral pulmonary masses the largest of which is located within the right upper lobe measuring 2 x 2 centimeters in size possibly representing primary lung cancer with metastatic disease. Additionally seen are enlarged right hilar and mediastinal    lymph nodes consistent with metastatic adenopathy.      2.  Likely ill-defined mixed lytic and sclerotic bony  metastases.      3.  Fatty liver.      CT-LSPINE W/O PLUS RECONS   Final Result      1.  Superior endplate compression fracture involving L2 resulting in 50% height loss. This is likely subacute in nature.      2.  Superior endplate compression fracture involving L4 which results in 30% height loss. This is likely subacute in nature.      3.  Superior endplate compression fracture involving L3 resulting in 20% height loss. Likely subacute or chronic in nature.      4.  Areas of patchy sclerosis involving all lumbar levels as well as a lucent area involving the right ilium. This is suspicious for metastatic disease.      5.  Multilevel degenerative disc disease and facet degeneration.      IR-NEEDLE BX-DEEP BONE    (Results Pending)     Recent Labs     08/23/21  0125   SODIUM 136   POTASSIUM 3.3*   CHLORIDE 103   CO2 22   GLUCOSE 128*   BUN 14     INR   Date Value Ref Range Status   09/22/2020 1.11 0.87 - 1.13 Final     Comment:     INR - Non-therapeutic Reference Range: 0.87-1.13  INR - Therapeutic Reference Range: 2.0-4.0       No results found for: POCINR   No intake or output data in the 24 hours ending 08/23/21 1455   Labs not explicitly included in this progress note were reviewed by the author. Radiology/imaging not explicitly included in this progress note was reviewed by the author.     History reviewed. No pertinent past medical history.     Home Medications    Not on File     I have performed a physical exam and reviewed and updated ROS and Plan today (8/23/2021).     30 minutes in directly providing and coordinating care and extensive data review.  No time overlap and excludes procedures.

## 2021-08-23 NOTE — ASSESSMENT & PLAN NOTE
Admit patient to neurosurgery floor   Noted to have lumbar compression fractures from L2-L4  Spine surgery, recommend TSLO brace  PT OT   Dilaudid 1 mg q4h prn

## 2021-08-23 NOTE — CARE PLAN
Problem: Knowledge Deficit - Standard  Goal: Patient and family/care givers will demonstrate understanding of plan of care, disease process/condition, diagnostic tests and medications  Outcome: Progressing     Problem: Pain - Standard  Goal: Alleviation of pain or a reduction in pain to the patient’s comfort goal  Outcome: Progressing   The patient is Stable - Low risk of patient condition declining or worsening         Progress made toward(s) clinical / shift goals:  Pt educated on POC. Pt verbalizes understanding. All questions answered at this time.    Patient is not progressing towards the following goals:

## 2021-08-23 NOTE — PROGRESS NOTES
Report received and assumed care of patient. Patient resting on gurney with no distress noted. Call bell in reach and safety measures in place.

## 2021-08-24 NOTE — DISCHARGE SUMMARY
Discharge Summary    CHIEF COMPLAINT ON ADMISSION  Chief Complaint   Patient presents with   • Low Back Pain     Pt reports having lower back pain for about a month and unable to manage the pain anymore.         Reason for Admission  Back     Admission Date  8/22/2021    CODE STATUS  Full Code    HPI & HOSPITAL COURSE  This is a 66 y.o. female here with lower back pain  Kayleen Alfredo is a 66 y.o. female who presented 8/22/2021 with worsening lower back pain.  She states that she has had back pain for several months now and has progressively getting worse to the point where she is unable to tolerate it.     In the ED, patient found to have normal vital signs.  CT L-spine shows superior endplate compression fracture involving L2-L3 and L4.  In addition, she has areas of patchy sclerosis involving all lumbar levels as well as a lucent area involving the right ilium.  Suspicious for metastatic disease.      CT also showing signs of malignancy  Bilateral pulmonary masses the largest of which is located within the right upper lobe measuring 2 x 2 centimeters in size possibly representing primary lung cancer with metastatic disease. Additionally seen are enlarged right hilar and mediastinal   lymph nodes consistent with metastatic adenopathy.    MRI lumbar:  1.  There is acute/subacute compression fracture of L2 vertebral body. There is an approximately 50% loss of its height.  2.  There is also subacute fracture along the superior endplate of L4 causing an approximately 30% height loss.  3.  Subacute compression fracture along the superior endplate of L3 vertebral body causing an approximately 20% height loss.  4.  There is an approximately 10 mm sized peripherally enhancing lesion in the L4 vertebral body. There are also focal T2 hyperintense lesions in the iliac bones.  5.  These findings are concerning for focal osseous lesions with pathological fractures.    Neurosurgery recommended medical management and TLSO  "brace.    IR was consulted and had planned for Osteocool RF ablation + BX on L2 and L4 with GA scheduled for 8/24/21.    Patient became very irritable to nursing staff demanding to go outside and smoke cigarettes.  RN notified me at 04:35PM. I called patient's bedside RN Daysi, spoke with patient over the phone.   \"I waited 20 hours, it's only right I get to smoke\" as per patient. Informed her of [facility] policies as a smoke-free campus. Patient was offered nicotine patch 21mg, she complained that it would take too long. Explained to her about plans for her biopsy tomorrow. Patient would not listen and remained verbally aggressive and irate.  Patient continued to demand to leave. Patient eventually left AGAINST MEDICAL ADVICE at 5:35PM.      Therefore, she is discharged in guarded and stable condition against medcial advice.    Discharge Date  8/23/2021    FOLLOW UP ITEMS POST DISCHARGE  N/A    DISCHARGE DIAGNOSES  Principal Problem:    Lumbar compression fracture (HCC) POA: Yes  Active Problems:    Metastatic disease (HCC) POA: Yes    Tobacco dependence (Chronic) POA: Yes    Hypokalemia POA: Yes    Elevated alkaline phosphatase level POA: Yes    History of syphilis (Chronic) POA: Yes      Overview: Positive RPR, quant and titer (09/2020)    Elevated glucose level POA: Yes    Left against medical advice POA: No  Resolved Problems:    * No resolved hospital problems. *  Patient left AMA    FOLLOW UP  No future appointments.  No follow-up provider specified.    MEDICATIONS ON DISCHARGE     Medication List      You have not been prescribed any medications.         Allergies  Allergies   Allergen Reactions   • Macrodantin [Nitrofurantoin]      High fever       DIET  Orders Placed This Encounter   Procedures   • Diet Order Diet: Regular     Standing Status:   Standing     Number of Occurrences:   1     Order Specific Question:   Diet:     Answer:   Regular [1]   • Diet NPO     Standing Status:   Standing     Number of " Occurrences:   8     Order Specific Question:   Restrict to:     Answer:   Sips with Medications [3]       ACTIVITY  N/A  Patient did not wait for TLSO brace fitting    CONSULTATIONS  Neurosurgery  Interventional Radiology    PROCEDURES  none    LABORATORY  Lab Results   Component Value Date    SODIUM 136 08/23/2021    POTASSIUM 3.3 (L) 08/23/2021    CHLORIDE 103 08/23/2021    CO2 22 08/23/2021    GLUCOSE 128 (H) 08/23/2021    BUN 14 08/23/2021    CREATININE 0.68 08/23/2021        Lab Results   Component Value Date    WBC 5.2 08/23/2021    HEMOGLOBIN 14.4 08/23/2021    HEMATOCRIT 44.6 08/23/2021    PLATELETCT 219 08/23/2021        Total time of the discharge process exceeds 56 minutes.        MR-LUMBAR SPINE-WITH & W/O   Final Result      1.  There is acute/subacute compression fracture of L2 vertebral body. There is an approximately 50% loss of its height.   2.  There is also subacute fracture along the superior endplate of L4 causing an approximately 30% height loss.   3.  Subacute compression fracture along the superior endplate of L3 vertebral body causing an approximately 20% height loss.   4.  There is an approximately 10 mm sized peripherally enhancing lesion in the L4 vertebral body. There are also focal T2 hyperintense lesions in the iliac bones.   5.  These findings are concerning for focal osseous lesions with pathological fractures.   6.  Degenerative disease as described above.      CT-CHEST,ABDOMEN,PELVIS WITH   Final Result      1.  Bilateral pulmonary masses the largest of which is located within the right upper lobe measuring 2 x 2 centimeters in size possibly representing primary lung cancer with metastatic disease. Additionally seen are enlarged right hilar and mediastinal    lymph nodes consistent with metastatic adenopathy.      2.  Likely ill-defined mixed lytic and sclerotic bony metastases.      3.  Fatty liver.      CT-LSPINE W/O PLUS RECONS   Final Result      1.  Superior endplate  compression fracture involving L2 resulting in 50% height loss. This is likely subacute in nature.      2.  Superior endplate compression fracture involving L4 which results in 30% height loss. This is likely subacute in nature.      3.  Superior endplate compression fracture involving L3 resulting in 20% height loss. Likely subacute or chronic in nature.      4.  Areas of patchy sclerosis involving all lumbar levels as well as a lucent area involving the right ilium. This is suspicious for metastatic disease.      5.  Multilevel degenerative disc disease and facet degeneration.

## 2021-08-24 NOTE — PROGRESS NOTES
"Pt very irritable, demanding to smoke a cigarrate. Pt educated by this RN that smoking is not allowed in the facility. Pt yelling \"I'm leaving, I need to smoke\". Pt educated about other medications available if she needs nicotine, and importance to stay due to biopsy scheduled for tomorrow. Patient refused and states \"You cannot stop me, I am leaving\". Hospitalist informed regarding patient's request to leave. Hospitalist spoke to patient on phone. No new orders at this time.    Pt left AMA, all belongings taken by patient. IV removed. IR informed.  "

## 2021-08-28 ENCOUNTER — HOSPITAL ENCOUNTER (INPATIENT)
Facility: MEDICAL CENTER | Age: 66
LOS: 13 days | DRG: 477 | End: 2021-09-11
Attending: EMERGENCY MEDICINE | Admitting: HOSPITALIST
Payer: MEDICARE

## 2021-08-28 DIAGNOSIS — C79.51 MALIGNANT NEOPLASM METASTATIC TO BONE (HCC): ICD-10-CM

## 2021-08-28 DIAGNOSIS — S32.040A COMPRESSION FRACTURE OF L4 VERTEBRA, INITIAL ENCOUNTER (HCC): ICD-10-CM

## 2021-08-28 DIAGNOSIS — G95.9 SPINAL CORD LESION (HCC): ICD-10-CM

## 2021-08-28 DIAGNOSIS — R91.8 PULMONARY MASS: ICD-10-CM

## 2021-08-28 DIAGNOSIS — M54.59 INTRACTABLE LOW BACK PAIN: ICD-10-CM

## 2021-08-28 DIAGNOSIS — S32.000S CLOSED COMPRESSION FRACTURE OF LUMBAR VERTEBRA, SEQUELA: ICD-10-CM

## 2021-08-28 PROBLEM — Z91.199 H/O NONCOMPLIANCE WITH MEDICAL TREATMENT, PRESENTING HAZARDS TO HEALTH: Status: ACTIVE | Noted: 2021-08-28

## 2021-08-28 LAB
ALBUMIN SERPL BCP-MCNC: 3.2 G/DL (ref 3.2–4.9)
ALBUMIN/GLOB SERPL: 0.9 G/DL
ALP SERPL-CCNC: 95 U/L (ref 30–99)
ALT SERPL-CCNC: 13 U/L (ref 2–50)
ANION GAP SERPL CALC-SCNC: 11 MMOL/L (ref 7–16)
ANISOCYTOSIS BLD QL SMEAR: ABNORMAL
AST SERPL-CCNC: 28 U/L (ref 12–45)
BASOPHILS # BLD AUTO: 0 % (ref 0–1.8)
BASOPHILS # BLD: 0 K/UL (ref 0–0.12)
BILIRUB SERPL-MCNC: 0.3 MG/DL (ref 0.1–1.5)
BUN SERPL-MCNC: 15 MG/DL (ref 8–22)
CALCIUM SERPL-MCNC: 8.3 MG/DL (ref 8.5–10.5)
CHLORIDE SERPL-SCNC: 100 MMOL/L (ref 96–112)
CO2 SERPL-SCNC: 21 MMOL/L (ref 20–33)
CREAT SERPL-MCNC: 0.8 MG/DL (ref 0.5–1.4)
EOSINOPHIL # BLD AUTO: 0 K/UL (ref 0–0.51)
EOSINOPHIL NFR BLD: 0 % (ref 0–6.9)
ERYTHROCYTE [DISTWIDTH] IN BLOOD BY AUTOMATED COUNT: 48.9 FL (ref 35.9–50)
GLOBULIN SER CALC-MCNC: 3.4 G/DL (ref 1.9–3.5)
GLUCOSE SERPL-MCNC: 77 MG/DL (ref 65–99)
HCT VFR BLD AUTO: 45.6 % (ref 37–47)
HGB BLD-MCNC: 15.1 G/DL (ref 12–16)
LYMPHOCYTES # BLD AUTO: 1.05 K/UL (ref 1–4.8)
LYMPHOCYTES NFR BLD: 16.7 % (ref 22–41)
MANUAL DIFF BLD: NORMAL
MCH RBC QN AUTO: 30.4 PG (ref 27–33)
MCHC RBC AUTO-ENTMCNC: 33.1 G/DL (ref 33.6–35)
MCV RBC AUTO: 91.8 FL (ref 81.4–97.8)
MICROCYTES BLD QL SMEAR: ABNORMAL
MONOCYTES # BLD AUTO: 0.44 K/UL (ref 0–0.85)
MONOCYTES NFR BLD AUTO: 7 % (ref 0–13.4)
MORPHOLOGY BLD-IMP: NORMAL
NEUTROPHILS # BLD AUTO: 4.81 K/UL (ref 2–7.15)
NEUTROPHILS NFR BLD: 74.6 % (ref 44–72)
NEUTS BAND NFR BLD MANUAL: 1.7 % (ref 0–10)
NRBC # BLD AUTO: 0 K/UL
NRBC BLD-RTO: 0 /100 WBC
PLATELET # BLD AUTO: 195 K/UL (ref 164–446)
PLATELET BLD QL SMEAR: NORMAL
PMV BLD AUTO: 9.4 FL (ref 9–12.9)
POLYCHROMASIA BLD QL SMEAR: NORMAL
POTASSIUM SERPL-SCNC: 3.5 MMOL/L (ref 3.6–5.5)
PROT SERPL-MCNC: 6.6 G/DL (ref 6–8.2)
RBC # BLD AUTO: 4.97 M/UL (ref 4.2–5.4)
RBC BLD AUTO: PRESENT
SODIUM SERPL-SCNC: 132 MMOL/L (ref 135–145)
WBC # BLD AUTO: 6.3 K/UL (ref 4.8–10.8)

## 2021-08-28 PROCEDURE — A9270 NON-COVERED ITEM OR SERVICE: HCPCS | Performed by: INTERNAL MEDICINE

## 2021-08-28 PROCEDURE — 99285 EMERGENCY DEPT VISIT HI MDM: CPT

## 2021-08-28 PROCEDURE — G0378 HOSPITAL OBSERVATION PER HR: HCPCS

## 2021-08-28 PROCEDURE — 36415 COLL VENOUS BLD VENIPUNCTURE: CPT

## 2021-08-28 PROCEDURE — 700111 HCHG RX REV CODE 636 W/ 250 OVERRIDE (IP): Performed by: INTERNAL MEDICINE

## 2021-08-28 PROCEDURE — 99220 PR INITIAL OBSERVATION CARE,LEVL III: CPT | Performed by: INTERNAL MEDICINE

## 2021-08-28 PROCEDURE — A9270 NON-COVERED ITEM OR SERVICE: HCPCS | Performed by: EMERGENCY MEDICINE

## 2021-08-28 PROCEDURE — 96374 THER/PROPH/DIAG INJ IV PUSH: CPT

## 2021-08-28 PROCEDURE — 85007 BL SMEAR W/DIFF WBC COUNT: CPT

## 2021-08-28 PROCEDURE — 700102 HCHG RX REV CODE 250 W/ 637 OVERRIDE(OP): Performed by: INTERNAL MEDICINE

## 2021-08-28 PROCEDURE — 700102 HCHG RX REV CODE 250 W/ 637 OVERRIDE(OP): Performed by: EMERGENCY MEDICINE

## 2021-08-28 PROCEDURE — 80053 COMPREHEN METABOLIC PANEL: CPT

## 2021-08-28 PROCEDURE — 85027 COMPLETE CBC AUTOMATED: CPT

## 2021-08-28 RX ORDER — POLYETHYLENE GLYCOL 3350 17 G/17G
1 POWDER, FOR SOLUTION ORAL
Status: DISCONTINUED | OUTPATIENT
Start: 2021-08-28 | End: 2021-09-11 | Stop reason: HOSPADM

## 2021-08-28 RX ORDER — ONDANSETRON 4 MG/1
4 TABLET, ORALLY DISINTEGRATING ORAL EVERY 4 HOURS PRN
Status: DISCONTINUED | OUTPATIENT
Start: 2021-08-28 | End: 2021-09-11 | Stop reason: HOSPADM

## 2021-08-28 RX ORDER — NICOTINE 21 MG/24HR
21 PATCH, TRANSDERMAL 24 HOURS TRANSDERMAL
Status: DISCONTINUED | OUTPATIENT
Start: 2021-08-29 | End: 2021-09-11 | Stop reason: HOSPADM

## 2021-08-28 RX ORDER — AMOXICILLIN 250 MG
2 CAPSULE ORAL 2 TIMES DAILY
Status: DISCONTINUED | OUTPATIENT
Start: 2021-08-28 | End: 2021-09-11 | Stop reason: HOSPADM

## 2021-08-28 RX ORDER — BISACODYL 10 MG
10 SUPPOSITORY, RECTAL RECTAL
Status: DISCONTINUED | OUTPATIENT
Start: 2021-08-28 | End: 2021-09-11 | Stop reason: HOSPADM

## 2021-08-28 RX ORDER — HYDROCODONE BITARTRATE AND ACETAMINOPHEN 5; 325 MG/1; MG/1
1 TABLET ORAL ONCE
Status: COMPLETED | OUTPATIENT
Start: 2021-08-28 | End: 2021-08-28

## 2021-08-28 RX ORDER — OXYCODONE HYDROCHLORIDE 10 MG/1
10 TABLET ORAL
Status: DISCONTINUED | OUTPATIENT
Start: 2021-08-28 | End: 2021-08-29

## 2021-08-28 RX ORDER — OXYCODONE HYDROCHLORIDE 5 MG/1
5 TABLET ORAL
Status: DISCONTINUED | OUTPATIENT
Start: 2021-08-28 | End: 2021-08-29

## 2021-08-28 RX ORDER — ONDANSETRON 2 MG/ML
4 INJECTION INTRAMUSCULAR; INTRAVENOUS EVERY 4 HOURS PRN
Status: DISCONTINUED | OUTPATIENT
Start: 2021-08-28 | End: 2021-09-11 | Stop reason: HOSPADM

## 2021-08-28 RX ORDER — HYDROMORPHONE HYDROCHLORIDE 1 MG/ML
0.5 INJECTION, SOLUTION INTRAMUSCULAR; INTRAVENOUS; SUBCUTANEOUS
Status: DISCONTINUED | OUTPATIENT
Start: 2021-08-28 | End: 2021-08-29

## 2021-08-28 RX ADMIN — NICOTINE POLACRILEX 2 MG: 2 GUM, CHEWING BUCCAL at 19:39

## 2021-08-28 RX ADMIN — HYDROMORPHONE HYDROCHLORIDE 0.5 MG: 1 INJECTION, SOLUTION INTRAMUSCULAR; INTRAVENOUS; SUBCUTANEOUS at 19:36

## 2021-08-28 RX ADMIN — HYDROCODONE BITARTRATE AND ACETAMINOPHEN 1 TABLET: 5; 325 TABLET ORAL at 18:33

## 2021-08-28 ASSESSMENT — ENCOUNTER SYMPTOMS
COUGH: 0
EYE PAIN: 0
DEPRESSION: 0
LOSS OF CONSCIOUSNESS: 0
NAUSEA: 0
PALPITATIONS: 0
FEVER: 0
SORE THROAT: 0
VOMITING: 0
SHORTNESS OF BREATH: 0
EYE REDNESS: 0
SINUS PAIN: 0
CHILLS: 0
BACK PAIN: 1
SEIZURES: 0
ABDOMINAL PAIN: 0

## 2021-08-28 ASSESSMENT — PATIENT HEALTH QUESTIONNAIRE - PHQ9
SUM OF ALL RESPONSES TO PHQ9 QUESTIONS 1 AND 2: 0
1. LITTLE INTEREST OR PLEASURE IN DOING THINGS: NOT AT ALL
2. FEELING DOWN, DEPRESSED, IRRITABLE, OR HOPELESS: NOT AT ALL

## 2021-08-28 ASSESSMENT — LIFESTYLE VARIABLES
TOTAL SCORE: 0
AVERAGE NUMBER OF DAYS PER WEEK YOU HAVE A DRINK CONTAINING ALCOHOL: 0
TOTAL SCORE: 0
EVER FELT BAD OR GUILTY ABOUT YOUR DRINKING: NO
HAVE YOU EVER FELT YOU SHOULD CUT DOWN ON YOUR DRINKING: NO
DOES PATIENT WANT TO STOP DRINKING: NO
EVER HAD A DRINK FIRST THING IN THE MORNING TO STEADY YOUR NERVES TO GET RID OF A HANGOVER: NO
ALCOHOL_USE: NO
DO YOU DRINK ALCOHOL: NO
ON A TYPICAL DAY WHEN YOU DRINK ALCOHOL HOW MANY DRINKS DO YOU HAVE: 0
HOW MANY TIMES IN THE PAST YEAR HAVE YOU HAD 5 OR MORE DRINKS IN A DAY: 0
CONSUMPTION TOTAL: NEGATIVE
HAVE PEOPLE ANNOYED YOU BY CRITICIZING YOUR DRINKING: NO
TOTAL SCORE: 0

## 2021-08-28 ASSESSMENT — PAIN DESCRIPTION - PAIN TYPE: TYPE: CHRONIC PAIN

## 2021-08-28 ASSESSMENT — FIBROSIS 4 INDEX
FIB4 SCORE: 2.588600915717735903
FIB4 SCORE: 1.78

## 2021-08-28 NOTE — ED TRIAGE NOTES
"Chief Complaint   Patient presents with   • Low Back Pain     pt states low back painx1 month. pt states given tylenol by [facility]. pt ambulatory into triage room     Pt BIB EMS from [facility]. Pt ambulated w/ steady gait. Pt denies any numbness or tingling. Educated pt on triage process and to notify if there is any change.      /70   Pulse 92   Temp 36.7 °C (98 °F) (Temporal)   Resp 16   Ht 1.626 m (5' 4\")   Wt 56.7 kg (125 lb)   SpO2 98%   BMI 21.46 kg/m²     "

## 2021-08-29 ENCOUNTER — APPOINTMENT (OUTPATIENT)
Dept: RADIOLOGY | Facility: MEDICAL CENTER | Age: 66
DRG: 477 | End: 2021-08-29
Attending: NURSE PRACTITIONER
Payer: MEDICARE

## 2021-08-29 LAB
ALBUMIN SERPL BCP-MCNC: 3.1 G/DL (ref 3.2–4.9)
ALBUMIN/GLOB SERPL: 0.9 G/DL
ALP SERPL-CCNC: 90 U/L (ref 30–99)
ALT SERPL-CCNC: 11 U/L (ref 2–50)
ANION GAP SERPL CALC-SCNC: 10 MMOL/L (ref 7–16)
AST SERPL-CCNC: 26 U/L (ref 12–45)
BASOPHILS # BLD AUTO: 0 % (ref 0–1.8)
BASOPHILS # BLD: 0 K/UL (ref 0–0.12)
BILIRUB SERPL-MCNC: 0.3 MG/DL (ref 0.1–1.5)
BUN SERPL-MCNC: 17 MG/DL (ref 8–22)
CALCIUM SERPL-MCNC: 8.2 MG/DL (ref 8.5–10.5)
CHLORIDE SERPL-SCNC: 99 MMOL/L (ref 96–112)
CO2 SERPL-SCNC: 21 MMOL/L (ref 20–33)
CREAT SERPL-MCNC: 0.88 MG/DL (ref 0.5–1.4)
EOSINOPHIL # BLD AUTO: 0 K/UL (ref 0–0.51)
EOSINOPHIL NFR BLD: 0 % (ref 0–6.9)
ERYTHROCYTE [DISTWIDTH] IN BLOOD BY AUTOMATED COUNT: 48.6 FL (ref 35.9–50)
GLOBULIN SER CALC-MCNC: 3.5 G/DL (ref 1.9–3.5)
GLUCOSE SERPL-MCNC: 82 MG/DL (ref 65–99)
HCT VFR BLD AUTO: 43.7 % (ref 37–47)
HGB BLD-MCNC: 14.6 G/DL (ref 12–16)
LYMPHOCYTES # BLD AUTO: 2.12 K/UL (ref 1–4.8)
LYMPHOCYTES NFR BLD: 28.6 % (ref 22–41)
MAGNESIUM SERPL-MCNC: 1.7 MG/DL (ref 1.5–2.5)
MANUAL DIFF BLD: NORMAL
MCH RBC QN AUTO: 30.5 PG (ref 27–33)
MCHC RBC AUTO-ENTMCNC: 33.4 G/DL (ref 33.6–35)
MCV RBC AUTO: 91.2 FL (ref 81.4–97.8)
MONOCYTES # BLD AUTO: 0.37 K/UL (ref 0–0.85)
MONOCYTES NFR BLD AUTO: 5 % (ref 0–13.4)
MORPHOLOGY BLD-IMP: NORMAL
NEUTROPHILS # BLD AUTO: 4.91 K/UL (ref 2–7.15)
NEUTROPHILS NFR BLD: 66.4 % (ref 44–72)
NRBC # BLD AUTO: 0 K/UL
NRBC BLD-RTO: 0 /100 WBC
PHOSPHATE SERPL-MCNC: 3 MG/DL (ref 2.5–4.5)
PLATELET # BLD AUTO: 217 K/UL (ref 164–446)
PLATELET BLD QL SMEAR: NORMAL
PMV BLD AUTO: 9.6 FL (ref 9–12.9)
POTASSIUM SERPL-SCNC: 3.5 MMOL/L (ref 3.6–5.5)
PROT SERPL-MCNC: 6.6 G/DL (ref 6–8.2)
RBC # BLD AUTO: 4.79 M/UL (ref 4.2–5.4)
RBC BLD AUTO: NORMAL
SODIUM SERPL-SCNC: 130 MMOL/L (ref 135–145)
WBC # BLD AUTO: 7.4 K/UL (ref 4.8–10.8)

## 2021-08-29 PROCEDURE — 99233 SBSQ HOSP IP/OBS HIGH 50: CPT | Performed by: NURSE PRACTITIONER

## 2021-08-29 PROCEDURE — 85007 BL SMEAR W/DIFF WBC COUNT: CPT

## 2021-08-29 PROCEDURE — 83735 ASSAY OF MAGNESIUM: CPT

## 2021-08-29 PROCEDURE — A9270 NON-COVERED ITEM OR SERVICE: HCPCS | Performed by: INTERNAL MEDICINE

## 2021-08-29 PROCEDURE — 770001 HCHG ROOM/CARE - MED/SURG/GYN PRIV*

## 2021-08-29 PROCEDURE — 700102 HCHG RX REV CODE 250 W/ 637 OVERRIDE(OP): Performed by: INTERNAL MEDICINE

## 2021-08-29 PROCEDURE — 700117 HCHG RX CONTRAST REV CODE 255: Performed by: NURSE PRACTITIONER

## 2021-08-29 PROCEDURE — A9576 INJ PROHANCE MULTIPACK: HCPCS | Performed by: NURSE PRACTITIONER

## 2021-08-29 PROCEDURE — 80053 COMPREHEN METABOLIC PANEL: CPT

## 2021-08-29 PROCEDURE — 85027 COMPLETE CBC AUTOMATED: CPT

## 2021-08-29 PROCEDURE — 96376 TX/PRO/DX INJ SAME DRUG ADON: CPT

## 2021-08-29 PROCEDURE — 70553 MRI BRAIN STEM W/O & W/DYE: CPT | Mod: MG

## 2021-08-29 PROCEDURE — A9270 NON-COVERED ITEM OR SERVICE: HCPCS | Performed by: NURSE PRACTITIONER

## 2021-08-29 PROCEDURE — 84100 ASSAY OF PHOSPHORUS: CPT

## 2021-08-29 PROCEDURE — 700111 HCHG RX REV CODE 636 W/ 250 OVERRIDE (IP): Performed by: INTERNAL MEDICINE

## 2021-08-29 PROCEDURE — 700111 HCHG RX REV CODE 636 W/ 250 OVERRIDE (IP): Performed by: NURSE PRACTITIONER

## 2021-08-29 PROCEDURE — 700102 HCHG RX REV CODE 250 W/ 637 OVERRIDE(OP): Performed by: NURSE PRACTITIONER

## 2021-08-29 RX ORDER — HYDROMORPHONE HYDROCHLORIDE 1 MG/ML
1 INJECTION, SOLUTION INTRAMUSCULAR; INTRAVENOUS; SUBCUTANEOUS
Status: DISCONTINUED | OUTPATIENT
Start: 2021-08-29 | End: 2021-08-31

## 2021-08-29 RX ORDER — OXYCODONE HYDROCHLORIDE 10 MG/1
10 TABLET ORAL
Status: DISCONTINUED | OUTPATIENT
Start: 2021-08-29 | End: 2021-08-31

## 2021-08-29 RX ORDER — DEXAMETHASONE 4 MG/1
4 TABLET ORAL EVERY 12 HOURS
Status: DISCONTINUED | OUTPATIENT
Start: 2021-08-29 | End: 2021-09-07

## 2021-08-29 RX ORDER — OXYCODONE HYDROCHLORIDE 5 MG/1
5 TABLET ORAL
Status: DISCONTINUED | OUTPATIENT
Start: 2021-08-29 | End: 2021-08-31

## 2021-08-29 RX ADMIN — HYDROMORPHONE HYDROCHLORIDE 1 MG: 1 INJECTION, SOLUTION INTRAMUSCULAR; INTRAVENOUS; SUBCUTANEOUS at 22:06

## 2021-08-29 RX ADMIN — OXYCODONE HYDROCHLORIDE 10 MG: 10 TABLET ORAL at 08:18

## 2021-08-29 RX ADMIN — DEXAMETHASONE 4 MG: 4 TABLET ORAL at 22:07

## 2021-08-29 RX ADMIN — NICOTINE TRANSDERMAL SYSTEM 21 MG: 21 PATCH, EXTENDED RELEASE TRANSDERMAL at 02:35

## 2021-08-29 RX ADMIN — GADOTERIDOL 12 ML: 279.3 INJECTION, SOLUTION INTRAVENOUS at 14:35

## 2021-08-29 RX ADMIN — OXYCODONE HYDROCHLORIDE 10 MG: 10 TABLET ORAL at 14:57

## 2021-08-29 RX ADMIN — HYDROMORPHONE HYDROCHLORIDE 1 MG: 1 INJECTION, SOLUTION INTRAMUSCULAR; INTRAVENOUS; SUBCUTANEOUS at 16:11

## 2021-08-29 RX ADMIN — DEXAMETHASONE 4 MG: 4 TABLET ORAL at 09:01

## 2021-08-29 RX ADMIN — NICOTINE TRANSDERMAL SYSTEM 21 MG: 21 PATCH, EXTENDED RELEASE TRANSDERMAL at 15:18

## 2021-08-29 RX ADMIN — HYDROMORPHONE HYDROCHLORIDE 0.5 MG: 1 INJECTION, SOLUTION INTRAMUSCULAR; INTRAVENOUS; SUBCUTANEOUS at 02:20

## 2021-08-29 RX ADMIN — HYDROMORPHONE HYDROCHLORIDE 1 MG: 1 INJECTION, SOLUTION INTRAMUSCULAR; INTRAVENOUS; SUBCUTANEOUS at 19:29

## 2021-08-29 ASSESSMENT — PAIN DESCRIPTION - PAIN TYPE
TYPE: CHRONIC PAIN

## 2021-08-29 ASSESSMENT — ENCOUNTER SYMPTOMS
EYE DISCHARGE: 0
COUGH: 1
NECK PAIN: 1
HEADACHES: 1
BACK PAIN: 1
ABDOMINAL PAIN: 0
DIARRHEA: 0
WEAKNESS: 1
EYE PAIN: 0
SHORTNESS OF BREATH: 1
NERVOUS/ANXIOUS: 1
SORE THROAT: 0
MEMORY LOSS: 1
SPUTUM PRODUCTION: 1
VOMITING: 0
FEVER: 0
CHILLS: 0
NAUSEA: 0
MYALGIAS: 1
DIZZINESS: 0

## 2021-08-29 NOTE — CARE PLAN
Problem: Pain - Standard  Goal: Alleviation of pain or a reduction in pain to the patient’s comfort goal  Outcome: Progressing     Problem: Knowledge Deficit - Standard  Goal: Patient and family/care givers will demonstrate understanding of plan of care, disease process/condition, diagnostic tests and medications  Outcome: Progressing     The patient is Stable - Low risk of patient condition declining or worsening    Shift Goals  Clinical Goals: Pain managment   Patient Goals: comfort / rest  Family Goals: not present    Progress made toward(s) clinical / shift goals:  pt able to make needs known; pt understands POC and safety precautions; pain controlled with current regimen    Patient is not progressing towards the following goals: n/a

## 2021-08-29 NOTE — PROGRESS NOTES
4 Eyes Skin Assessment Completed by Courtney, RN and Joe RN.    Head WDL  Ears WDL  Nose WDL  Mouth WDL  Neck WDL  Breast/Chest WDL  Shoulder Blades WDL  Spine WDL  (R) Arm/Elbow/Hand WDL  (L) Arm/Elbow/Hand WDL  Abdomen WDL  Groin WDL  Scrotum/Coccyx/Buttocks WDL  (R) Leg WDL  (L) Leg WDL  (R) Heel/Foot/Toe WDL  (L) Heel/Foot/Toe WDL          Devices In Places Pulse Ox      Interventions In Place Pillows    Possible Skin Injury No    Pictures Uploaded Into Epic N/A  Wound Consult Placed N/A  RN Wound Prevention Protocol Ordered No

## 2021-08-29 NOTE — ASSESSMENT & PLAN NOTE
CT chest, abdomen, pelvis with contrast from 8/23/2021 showed bilateral pulmonary masses (largest measuring 2 x 2 cm) likely representing primary lung cancer with metastatic disease. She had enlarged right hilar and mediastinal lymph nodes consistent with metastatic adenopathy.

## 2021-08-29 NOTE — CARE PLAN
The patient is Stable - Low risk of patient condition declining or worsening    Shift Goals  Clinical Goals: Pain managment   Patient Goals: comfort / rest  Family Goals: not present      Problem: Knowledge Deficit - Standard  Goal: Patient and family/care givers will demonstrate understanding of plan of care, disease process/condition, diagnostic tests and medications  Outcome: Progressing     Problem: Pain - Standard  Goal: Alleviation of pain or a reduction in pain to the patient’s comfort goal  Outcome: Progressing

## 2021-08-29 NOTE — HOSPITAL COURSE
Patient is a 66-year-old female with no medical history of smoking.  Patient was recently admitted to the hospital 8/22-8/23/21, for similar complaints of intractable back pain.  Patient presented on 8/28/2021 with complaints of intractable back pain to the lumbar region that has been going on for approximately 1 month.  Patient called EMS and was given Tylenol by the ambulance and transported to the emergency room.  Patient left AMA on previous admission however during that admission MRI of lumbar spine demonstrated acute/subacute compression fracture of L2, subacute fracture of L4, compression fracture of L3 and focal lesions on L4 concerning for pathological fracture.  IR had previously been consulted and planned for osteocool RF ablation and biopsies however patient had left AMA.  CT chest abdomen pelvis completed in the emergency room which revealed bilateral pulmonary masses largest located in the right upper lobe measuring 2 x 2 cm possibly representing primary lung cancer with metastatic disease.  Additionally seen are enlarged right hilar and mediastinal lymph nodes consistent with metastatic adenopathy, an ill-defined mixed lytic and sclerotic bony metastasis.

## 2021-08-29 NOTE — ASSESSMENT & PLAN NOTE
Recently left AGAINST MEDICAL ADVICE.  Continue to  on importance of adhering to medical recommendations and treatment.

## 2021-08-29 NOTE — ED NOTES
Med rec updated and complete.  Allergies reviewed. Pt denies  Taking medications.      Home pharmacy [facility] [phone]

## 2021-08-29 NOTE — ASSESSMENT & PLAN NOTE
Pulmonary mass with suspected spinal lesions. IR Osteocool RF ablation + BX on L2 and L4 with GA scheduled for today. Continue pain medications and dexamethasone.  Pathology consistent with malignancy  Pending final oncology recommendations

## 2021-08-29 NOTE — ED NOTES
Pt refuses to change out of clothes because she is cold. Pt walked out of room to nursing station for multiple warm blankets.

## 2021-08-29 NOTE — PROGRESS NOTES
"Pt stating MRI removed nicotine patch, demanding a new one to be placed. Old patch wasted in [employer] and new patch placed, see MAR. Pt reporting 7/10 pain, medicated per MAR and medication orders. Pt demanding stronger pain medication 20 minutes after receiving oral medication, pt educated on medication orders. Pt repeats the same questions frequently during conversation. Pt stated \"no doctor has seen me at all today\", pt reminded of bedside visit from [facility] at 0800 this morning. Heat pack provided to pt. Pt reports not liking her provided lunch tray, offered to get pt something else and pt refused.   "

## 2021-08-29 NOTE — ASSESSMENT & PLAN NOTE
MR lumbar spine from 8/23/2001 showed acute/subacute compression fracture of L2, L4, and L3, an approximately 10 mm peripherally enhancing lesion in the L4 vertebral body, focal T2 hyper intense lesions in the iliac bones concerning for osseous lesions with pathological fractures. IR were previously consulted for ostial cool RF ablation with biopsies, patient left AMA. Now planned for today  No spinal cord compression noted on imaging  Patient without cauda equina, bowel/bladder incontinence, or radiculopathy

## 2021-08-29 NOTE — ED NOTES
"PIV initiated, blood drawn and sent to lab. Pt medicated per MAR.    Pt reporting there was a male doctor in her room that told her was going to get \"stronger IV pain meds and a nicotine patch\".     "

## 2021-08-29 NOTE — PROGRESS NOTES
[facility] Medicine Daily Progress Note    Date of Service  8/29/2021    Chief Complaint  Kayleen Alfredo is a 66 y.o. female admitted 8/28/2021 with intractable back pain possible metastases.     Hospital Course  Patient is a 66-year-old female with no medical history of smoking.  Patient was recently admitted to the hospital 8/22-8/23/21, for similar complaints of intractable back pain.  Patient presented on 8/28/2021 with complaints of intractable back pain to the lumbar region that has been going on for approximately 1 month.  Patient called EMS and was given Tylenol by the ambulance and transported to the emergency room.  Patient left AMA on previous admission however during that admission MRI of lumbar spine demonstrated acute/subacute compression fracture of L2, subacute fracture of L4, compression fracture of L3 and focal lesions on L4 concerning for pathological fracture.  IR had previously been consulted and planned for osteocool RF ablation and biopsies however patient had left AMA.  CT chest abdomen pelvis completed in the emergency room which revealed bilateral pulmonary masses largest located in the right upper lobe measuring 2 x 2 cm possibly representing primary lung cancer with metastatic disease.  Additionally seen are enlarged right hilar and mediastinal lymph nodes consistent with metastatic adenopathy, an ill-defined mixed lytic and sclerotic bony metastasis.       Interval Problem Update  8/29- Patient resting in bed, states significant pain to lower back and continuous cough. Discussed imaging with patient and need for further imaging and biopsies. Patient understands but then asks for repeat of explanation stating she has already forgotten information just discussed. MRI brain ordered given suspected diagnosis and patient inability to retain information. IR has been ordered for possible biopsy tomorrow. Palliative consult placed to discuss advance care planning, goals of care, and pain  management. Have placed transfer orders to Oncology. Dexamethasone added to help with bone pain.     I have personally seen and examined the patient at bedside. I discussed the plan of care with patient, bedside RN and charge RN.    Consultants/Specialty  None - Will consider Oncology, Radiation Oncology with results of biopsy     Code Status  Full Code    Disposition  Patient is not medically cleared.   Anticipate discharge to unclear at this time.  I have placed the appropriate orders for post-discharge needs.  Transfer to Oncology       Review of Systems  Review of Systems   Constitutional: Positive for malaise/fatigue. Negative for chills and fever.   HENT: Positive for congestion. Negative for sore throat.    Eyes: Negative for pain and discharge.   Respiratory: Positive for cough, sputum production and shortness of breath.    Cardiovascular: Positive for chest pain. Negative for leg swelling.   Gastrointestinal: Negative for abdominal pain, diarrhea, nausea and vomiting.   Genitourinary: Negative for dysuria, frequency and urgency.   Musculoskeletal: Positive for back pain, myalgias and neck pain.   Skin: Negative for rash.   Neurological: Positive for weakness and headaches. Negative for dizziness.   Psychiatric/Behavioral: Positive for memory loss. The patient is nervous/anxious.         Physical Exam  Temp:  [36.5 °C (97.7 °F)-37.3 °C (99.1 °F)] 37.3 °C (99.1 °F)  Pulse:  [60-92] 78  Resp:  [14-18] 16  BP: (111-138)/(67-80) 138/76  SpO2:  [89 %-98 %] 90 %    Physical Exam  Vitals and nursing note reviewed.   Constitutional:       General: She is awake. She is not in acute distress.     Appearance: She is normal weight.      Comments: Disheveled appearance    HENT:      Head: Normocephalic and atraumatic.      Mouth/Throat:      Lips: Pink.      Mouth: Mucous membranes are moist.      Dentition: Abnormal dentition.   Eyes:      General: Lids are normal.      Conjunctiva/sclera: Conjunctivae normal.    Cardiovascular:      Rate and Rhythm: Normal rate.      Heart sounds: Normal heart sounds.   Pulmonary:      Effort: No respiratory distress.      Breath sounds: Examination of the right-upper field reveals rhonchi. Examination of the left-upper field reveals rhonchi. Examination of the right-lower field reveals decreased breath sounds. Examination of the left-lower field reveals decreased breath sounds. Decreased breath sounds and rhonchi present.   Abdominal:      General: Bowel sounds are normal.      Palpations: Abdomen is soft.      Tenderness: There is no abdominal tenderness.   Musculoskeletal:      Cervical back: Neck supple.      Thoracic back: Tenderness present.      Lumbar back: Tenderness present.   Skin:     General: Skin is warm and dry.   Neurological:      Mental Status: She is alert and oriented to person, place, and time.      Comments: Oriented but forgetful too situation- needs repetitive directions and explanations    Psychiatric:         Attention and Perception: She is inattentive.         Mood and Affect: Mood is anxious. Affect is labile, blunt and angry.         Behavior: Behavior is agitated.         Cognition and Memory: Cognition is impaired. She exhibits impaired recent memory.         Fluids  No intake or output data in the 24 hours ending 08/29/21 0835    Laboratory  Recent Labs     08/28/21  1826 08/29/21  0231   WBC 6.3 7.4   RBC 4.97 4.79   HEMOGLOBIN 15.1 14.6   HEMATOCRIT 45.6 43.7   MCV 91.8 91.2   MCH 30.4 30.5   MCHC 33.1* 33.4*   RDW 48.9 48.6   PLATELETCT 195 217   MPV 9.4 9.6     Recent Labs     08/28/21  1826 08/29/21  0231   SODIUM 132* 130*   POTASSIUM 3.5* 3.5*   CHLORIDE 100 99   CO2 21 21   GLUCOSE 77 82   BUN 15 17   CREATININE 0.80 0.88   CALCIUM 8.3* 8.2*                   Imaging  IR-CONSULT AND TREAT    (Results Pending)   MR-BRAIN-W/O    (Results Pending)        Assessment/Plan  * Metastatic disease (HCC)- (present on admission)  Assessment & Plan  Pulmonary  mass with apparent mets to spine  IR consulted for biopsy  IP Palliative consult for goal planning, pain management, and treatment plan  Dexamethasone added to help with bone pain   Symptom management   Will consider Oncology/ Radiation Oncology once biopsy result and imaging complete     Pulmonary mass  Assessment & Plan  Supplemental oxygen as needed   RT protocol  IR biopsy consult placed   As identified on CT  Bilateral pulmonary masses the largest of which is located within the right upper lobe measuring 2 x 2 centimeters in size possibly representing primary lung cancer with metastatic disease. Additionally seen are enlarged right hilar and mediastinal   lymph nodes consistent with metastatic adenopathy.      Lumbar compression fracture (HCC)- (present on admission)  Assessment & Plan  MRI lumbar demonstrated acute/subacute compression fracture of L2 vertebral body, subacute fracture of L4, compression fracture of L3 as well as focal osseous lesions and L4 and T2 concerning for pathologic fracture.  - IR was consulted and had planned for osteocool RF ablation and biopsy on L2 and L4.    H/O noncompliance with medical treatment, presenting hazards to health  Assessment & Plan  Patient left AMA 5 days ago when pursuing treatment for above  States she will not do so again     Elevated alkaline phosphatase level- (present on admission)  Assessment & Plan  Alk phos 131  Likely relates to osseous mets    Tobacco dependence- (present on admission)  Assessment & Plan  Tobacco cessation counseling and education provided for4 minutes. Nicotine replacement options provided including patch, and further medical treatments including Wellbutrin and Chantix. As well as over the counter options of lozenges and gum.         VTE prophylaxis: enoxaparin ppx    I have performed a physical exam and reviewed and updated ROS and Plan today (8/29/2021). In review of yesterday's note (8/28/2021), there are no changes except as  documented above.

## 2021-08-29 NOTE — ASSESSMENT & PLAN NOTE
Patient received counseling on tobacco cessation on admission.  Continue to  on quitting tobacco.

## 2021-08-29 NOTE — H&P
Hospital Medicine History & Physical Note    Date of Service  8/28/2021    Primary Care Physician  Pcp Unknown    Consultants  IR    Code Status  Full Code    Chief Complaint  Chief Complaint   Patient presents with   • Low Back Pain     pt states low back painx1 month. pt states given tylenol by [facility]. pt ambulatory into triage room       History of Presenting Illness  Kayleen Alfredo is a 66 y.o. female who presented 8/28/2021 with intractable back pain. Patient was recently hospitalized 822-8/23/2021 for her presentation.  And left AMA.  Patient presenting again today with intractable back pain.  On previous admission MRI lumbar demonstrated acute/subacute compression fracture of L2 vertebral body, subacute fracture of L4, compression fracture of L3 as well as focal osseous lesions and L4 and T2 concerning for pathologic fracture.  IR was consulted and had planned for osteocool RF ablation and biopsy on L2 and L4.       Review of Systems  Review of Systems   Constitutional: Negative for chills and fever.   HENT: Negative for sinus pain and sore throat.    Eyes: Negative for pain and redness.   Respiratory: Negative for cough and shortness of breath.    Cardiovascular: Negative for chest pain and palpitations.   Gastrointestinal: Negative for abdominal pain, nausea and vomiting.   Genitourinary: Negative for dysuria and frequency.   Musculoskeletal: Positive for back pain.   Neurological: Negative for seizures and loss of consciousness.   Psychiatric/Behavioral: Negative for depression.       Past Medical History   has no past medical history on file.     Reviewed and not pertinent       Surgical History   has a past surgical history that includes exam under anesthesia (9/22/2020) and rectal biopsy (N/A, 9/22/2020).     Family History  family history is not on file.   Family history reviewed with patient. There is no family history that is pertinent to the chief complaint.     Social History   reports that she has  been smoking cigarettes. She has been smoking about 0.50 packs per day. She has never used smokeless tobacco. She reports that she does not drink alcohol and does not use drugs.    Allergies  Allergies   Allergen Reactions   • Macrodantin [Nitrofurantoin]      High fever       Medications  None       Physical Exam  Temp:  [36.5 °C (97.7 °F)-36.7 °C (98 °F)] 36.5 °C (97.7 °F)  Pulse:  [82-92] 82  Resp:  [14-16] 14  BP: (111-121)/(70-80) 116/79  SpO2:  [94 %-98 %] 95 %    Physical Exam  Constitutional:       General: She is not in acute distress.     Appearance: She is not toxic-appearing.   HENT:      Head: Normocephalic.      Right Ear: External ear normal.      Left Ear: External ear normal.      Nose: Nose normal. No congestion.      Mouth/Throat:      Mouth: Mucous membranes are moist.      Pharynx: No oropharyngeal exudate.   Eyes:      General: No scleral icterus.     Pupils: Pupils are equal, round, and reactive to light.   Cardiovascular:      Rate and Rhythm: Normal rate and regular rhythm.      Heart sounds: No murmur heard.     Pulmonary:      Effort: No respiratory distress.      Breath sounds: No wheezing.   Abdominal:      Palpations: Abdomen is soft.      Tenderness: There is no abdominal tenderness. There is no guarding or rebound.   Musculoskeletal:         General: Tenderness (tenderness to palpation over lumbar spinous process) present. No swelling or deformity.   Skin:     Coloration: Skin is not jaundiced.   Neurological:      General: No focal deficit present.      Mental Status: She is alert and oriented to person, place, and time. Mental status is at baseline.      Cranial Nerves: No cranial nerve deficit.      Sensory: No sensory deficit.   Psychiatric:         Mood and Affect: Mood normal.         Behavior: Behavior normal.         Laboratory:  Recent Labs     08/28/21  1826   WBC 6.3   RBC 4.97   HEMOGLOBIN 15.1   HEMATOCRIT 45.6   MCV 91.8   MCH 30.4   MCHC 33.1*   RDW 48.9   PLATELETCT  195   MPV 9.4     Recent Labs     08/28/21  1826   SODIUM 132*   POTASSIUM 3.5*   CHLORIDE 100   CO2 21   GLUCOSE 77   BUN 15   CREATININE 0.80   CALCIUM 8.3*     Recent Labs     08/28/21  1826   ALTSGPT 13   ASTSGOT 28   ALKPHOSPHAT 95   TBILIRUBIN 0.3   GLUCOSE 77         No results for input(s): NTPROBNP in the last 72 hours.      No results for input(s): TROPONINT in the last 72 hours.    Imaging:  IR-CONSULT AND TREAT    (Results Pending)       no X-Ray or EKG requiring interpretation    Assessment/Plan:  I anticipate this patient is appropriate for observation status at this time.    Pulmonary mass  Assessment & Plan  As identified on CT  Bilateral pulmonary masses the largest of which is located within the right upper lobe measuring 2 x 2 centimeters in size possibly representing primary lung cancer with metastatic disease. Additionally seen are enlarged right hilar and mediastinal   lymph nodes consistent with metastatic adenopathy.    Lumbar compression fracture (HCC)- (present on admission)  Assessment & Plan  MRI lumbar demonstrated acute/subacute compression fracture of L2 vertebral body, subacute fracture of L4, compression fracture of L3 as well as focal osseous lesions and L4 and T2 concerning for pathologic fracture.  - IR was consulted and had planned for osteocool RF ablation and biopsy on L2 and L4.    Metastatic disease (HCC)- (present on admission)  Assessment & Plan  Pulmonary mass with apparent mets to spine  IR consulted for biopsy    H/O noncompliance with medical treatment, presenting hazards to health  Assessment & Plan  Patient left AMA 5 days ago when pursuing treatment for above    Elevated alkaline phosphatase level- (present on admission)  Assessment & Plan  Alk phos 131  Likely relates to osseous mets    Tobacco dependence- (present on admission)  Assessment & Plan  Tobacco cessation counseling and education provided for4 minutes. Nicotine replacement options provided including  patch, and further medical treatments including Wellbutrin and Chantix. As well as over the counter options of lozenges and gum.        VTE prophylaxis: SCDs/TEDs and enoxaparin ppx

## 2021-08-29 NOTE — PROGRESS NOTES
"Patient assessment completed. Pt A&Ox 4, and very anxious at this time stating \"I am so uncomfortable and everyone is being so mean here\". Respirations are even and unlabored on room air. Pt reports 4/10 back pain but denies interventions at this time, and states \"I just want to sleep and be left alone\". Patient VS stable, call light and belongings within reach. POC updated (pain managment). Pt educated on room and call light, pt verbalized understanding. Communication board updated. Needs met.  "

## 2021-08-29 NOTE — PROGRESS NOTES
"Attempted to reassess and draw AM labs on patient, and patient refused. Patient was agitated and yelled at me, \"I will not lay here and let you do anything to me. Just give me pain meds and get away from me. Just give me pain meds now\".  "

## 2021-08-29 NOTE — ED PROVIDER NOTES
ED Provider Note    Scribed for Yina Keller M.D. by Anni Mcdonough. 8/28/2021  5:10 PM    Primary care provider: No primary care provider noted.   Means of arrival: EMS  History obtained from: patient  History limited by: none noted    CHIEF COMPLAINT  Chief Complaint   Patient presents with    Low Back Pain     pt states low back painx1 month. pt states given tylenol by [facility]. pt ambulatory into triage room       HPI  Kayleen Alfredo is a 66 y.o. female who presents to the Emergency Department via EMS with lower back pain onset one month. The patient was hospitalized at [facility] ED on 8/22 for MRI and they found lesions that consistent with possible metastasis.  She also has a lung nodule.  She also has multiple subacute compression fractures.  She was scheduled to have a biopsy and ablation on the 24th, but she left AMA because she wanted to smoke and refused a nicotine patch. The patient returned to [facility] ED today from [facility] due to the persisting back pain. She state she does not have somewhere to stay and does not know if she can do outpatient care. Patient is not vaccinated.  She denies any numbness in the groin weakness in the legs or loss of bowel or bladder control.  She denies any falls fevers or chills.    REVIEW OF SYSTEMS  Neuro: no weakness, numbness  Musculoskeletal: back pain    See history of present illness. All other systems are negative. C.    PAST MEDICAL HISTORY       SURGICAL HISTORY   has a past surgical history that includes exam under anesthesia (9/22/2020) and rectal biopsy (N/A, 9/22/2020).    SOCIAL HISTORY  Social History     Tobacco Use    Smoking status: Current Every Day Smoker     Packs/day: 0.50     Types: Cigarettes    Smokeless tobacco: Never Used   Vaping Use    Vaping Use: Never used   Substance Use Topics    Alcohol use: Never    Drug use: Never      Social History     Substance and Sexual Activity   Drug Use Never       FAMILY HISTORY  No family history noted.  "    CURRENT MEDICATIONS  Home Medications       Reviewed by Suki Watkins (Pharmacy Tech) on 08/28/21 at 1857  Med List Status: Complete     Medication Last Dose Status        Patient Antoni Taking any Medications                           ALLERGIES  Allergies   Allergen Reactions    Macrodantin [Nitrofurantoin]      High fever       PHYSICAL EXAM  VITAL SIGNS: /80   Pulse 87   Temp 36.7 °C (98 °F) (Temporal)   Resp 16   Ht 1.626 m (5' 4\")   Wt 56.7 kg (125 lb)   SpO2 94%   BMI 21.46 kg/m²     Constitutional: Unkempt, Well developed, Well nourished, No acute distress, Non-toxic appearance.   HEENT: Normocephalic, Atraumatic,  external ears normal, pharynx pink,  Mucous  Membranes moist, No rhinorrhea or mucosal edema  Eyes: PERRL, EOMI, Conjunctiva normal, No discharge.   Neck: Normal range of motion, No tenderness, Supple, No stridor.   Lymphatic: No lymphadenopathy    Cardiovascular: Regular Rate and Rhythm, No murmurs,  rubs, or gallops.   Thorax & Lungs: Non-productive cough, Scattered rhonchi, Lungs clear to auscultation bilaterally  Abdomen: Bowel sounds normal, Soft, non tender, non distended,  No pulsatile masses., no rebound guarding or peritoneal signs.   Skin: Warm, Dry, No erythema, No rash,   Back: Diffuse lower back pain, Midline back tenderness.   Neurologic: Alert & oriented clear speech no focal deficits  Extremities: Equal, intact distal pulses, No cyanosis, clubbing or edema,  No tenderness.   Musculoskeletal: Good range of motion in all major joints. No tenderness to palpation or major deformities noted.     DIAGNOSTIC STUDIES / PROCEDURES    LABS  Results for orders placed or performed during the hospital encounter of 08/28/21   CBC WITH DIFFERENTIAL   Result Value Ref Range    WBC 6.3 4.8 - 10.8 K/uL    RBC 4.97 4.20 - 5.40 M/uL    Hemoglobin 15.1 12.0 - 16.0 g/dL    Hematocrit 45.6 37.0 - 47.0 %    MCV 91.8 81.4 - 97.8 fL    MCH 30.4 27.0 - 33.0 pg    MCHC 33.1 (L) 33.6 - 35.0 " g/dL    RDW 48.9 35.9 - 50.0 fL    Platelet Count 195 164 - 446 K/uL    MPV 9.4 9.0 - 12.9 fL    Neutrophils-Polys 74.60 (H) 44.00 - 72.00 %    Lymphocytes 16.70 (L) 22.00 - 41.00 %    Monocytes 7.00 0.00 - 13.40 %    Eosinophils 0.00 0.00 - 6.90 %    Basophils 0.00 0.00 - 1.80 %    Nucleated RBC 0.00 /100 WBC    Neutrophils (Absolute) 4.81 2.00 - 7.15 K/uL    Lymphs (Absolute) 1.05 1.00 - 4.80 K/uL    Monos (Absolute) 0.44 0.00 - 0.85 K/uL    Eos (Absolute) 0.00 0.00 - 0.51 K/uL    Baso (Absolute) 0.00 0.00 - 0.12 K/uL    NRBC (Absolute) 0.00 K/uL    Anisocytosis 1+     Microcytosis 1+    Comp Metabolic Panel   Result Value Ref Range    Sodium 132 (L) 135 - 145 mmol/L    Potassium 3.5 (L) 3.6 - 5.5 mmol/L    Chloride 100 96 - 112 mmol/L    Co2 21 20 - 33 mmol/L    Anion Gap 11.0 7.0 - 16.0    Glucose 77 65 - 99 mg/dL    Bun 15 8 - 22 mg/dL    Creatinine 0.80 0.50 - 1.40 mg/dL    Calcium 8.3 (L) 8.5 - 10.5 mg/dL    AST(SGOT) 28 12 - 45 U/L    ALT(SGPT) 13 2 - 50 U/L    Alkaline Phosphatase 95 30 - 99 U/L    Total Bilirubin 0.3 0.1 - 1.5 mg/dL    Albumin 3.2 3.2 - 4.9 g/dL    Total Protein 6.6 6.0 - 8.2 g/dL    Globulin 3.4 1.9 - 3.5 g/dL    A-G Ratio 0.9 g/dL   ESTIMATED GFR   Result Value Ref Range    GFR If African American >60 >60 mL/min/1.73 m 2    GFR If Non African American >60 >60 mL/min/1.73 m 2   DIFFERENTIAL MANUAL   Result Value Ref Range    Bands-Stabs 1.70 0.00 - 10.00 %    Manual Diff Status PERFORMED    PERIPHERAL SMEAR REVIEW   Result Value Ref Range    Peripheral Smear Review see below    PLATELET ESTIMATE   Result Value Ref Range    Plt Estimation Normal    MORPHOLOGY   Result Value Ref Range    RBC Morphology Present     Polychromia 1+        All labs reviewed by me.      COURSE & MEDICAL DECISION MAKING  Nursing notes, VS, PMSFHx reviewed in chart.    5:10 PM Patient seen and examined at bedside. Patient will be treated with Norco 325 mg. Ordered CBC w/ Differential and CMP to evaluate her  symptoms. The differential diagnoses include but are not limited to:Several subacute fracture vs metastatic cancer with secondary pain Review of previous records shows pulmonary nodules in CT results and CT and MRI showed no impingement.     5:44 PM - I discussed the patient's case and the above findings with Dr. Lombardo (Hospitalist) who agreed to hospitalize the patient for further evaluation and treatment. Plan of care was discussed with the patient and she was understanding and agreeable. She agreed to not leave AMA again so she can receive her full treatment.     DISPOSITION:  Patient will be hospitalized by Dr. Lombrado in guarded condition.      FINAL IMPRESSION  1. Intractable low back pain    2. Closed compression fracture of lumbar vertebra, sequela    3. Spinal cord lesion (HCC)          IAnni (Scribe), am scribing for, and in the presence of, Yina Keller M.D..    Electronically signed by: Anni Mcdonough (Scribe), 8/28/2021    IYina M.D. personally performed the services described in this documentation, as scribed by Anni Mcdonough in my presence, and it is both accurate and complete.    The note accurately reflects work and decisions made by me.  Yina Keller M.D.  8/28/2021  7:19 PM

## 2021-08-30 ENCOUNTER — PATIENT OUTREACH (OUTPATIENT)
Dept: HEALTH INFORMATION MANAGEMENT | Facility: OTHER | Age: 66
End: 2021-08-30

## 2021-08-30 LAB
ALBUMIN SERPL BCP-MCNC: 3.3 G/DL (ref 3.2–4.9)
ALBUMIN/GLOB SERPL: 0.9 G/DL
ALP SERPL-CCNC: 97 U/L (ref 30–99)
ALT SERPL-CCNC: 14 U/L (ref 2–50)
ANION GAP SERPL CALC-SCNC: 10 MMOL/L (ref 7–16)
AST SERPL-CCNC: 20 U/L (ref 12–45)
BASOPHILS # BLD AUTO: 0 % (ref 0–1.8)
BASOPHILS # BLD: 0 K/UL (ref 0–0.12)
BILIRUB SERPL-MCNC: 0.3 MG/DL (ref 0.1–1.5)
BUN SERPL-MCNC: 15 MG/DL (ref 8–22)
CALCIUM SERPL-MCNC: 8.9 MG/DL (ref 8.5–10.5)
CHLORIDE SERPL-SCNC: 100 MMOL/L (ref 96–112)
CO2 SERPL-SCNC: 21 MMOL/L (ref 20–33)
CREAT SERPL-MCNC: 0.52 MG/DL (ref 0.5–1.4)
EOSINOPHIL # BLD AUTO: 0 K/UL (ref 0–0.51)
EOSINOPHIL NFR BLD: 0 % (ref 0–6.9)
ERYTHROCYTE [DISTWIDTH] IN BLOOD BY AUTOMATED COUNT: 48 FL (ref 35.9–50)
GLOBULIN SER CALC-MCNC: 3.8 G/DL (ref 1.9–3.5)
GLUCOSE SERPL-MCNC: 129 MG/DL (ref 65–99)
HCT VFR BLD AUTO: 47.1 % (ref 37–47)
HGB BLD-MCNC: 15.4 G/DL (ref 12–16)
INR PPP: 1.01 (ref 0.87–1.13)
LYMPHOCYTES # BLD AUTO: 1.04 K/UL (ref 1–4.8)
LYMPHOCYTES NFR BLD: 14.8 % (ref 22–41)
MANUAL DIFF BLD: NORMAL
MCH RBC QN AUTO: 30.1 PG (ref 27–33)
MCHC RBC AUTO-ENTMCNC: 32.7 G/DL (ref 33.6–35)
MCV RBC AUTO: 92 FL (ref 81.4–97.8)
MONOCYTES # BLD AUTO: 0.48 K/UL (ref 0–0.85)
MONOCYTES NFR BLD AUTO: 6.9 % (ref 0–13.4)
MORPHOLOGY BLD-IMP: NORMAL
NEUTROPHILS # BLD AUTO: 5.48 K/UL (ref 2–7.15)
NEUTROPHILS NFR BLD: 78.3 % (ref 44–72)
NRBC # BLD AUTO: 0 K/UL
NRBC BLD-RTO: 0 /100 WBC
PLATELET # BLD AUTO: 264 K/UL (ref 164–446)
PLATELET BLD QL SMEAR: NORMAL
PMV BLD AUTO: 9.6 FL (ref 9–12.9)
POTASSIUM SERPL-SCNC: 4.4 MMOL/L (ref 3.6–5.5)
PROT SERPL-MCNC: 7.1 G/DL (ref 6–8.2)
PROTHROMBIN TIME: 13 SEC (ref 12–14.6)
RBC # BLD AUTO: 5.12 M/UL (ref 4.2–5.4)
RBC BLD AUTO: NORMAL
SODIUM SERPL-SCNC: 131 MMOL/L (ref 135–145)
WBC # BLD AUTO: 7 K/UL (ref 4.8–10.8)

## 2021-08-30 PROCEDURE — 85027 COMPLETE CBC AUTOMATED: CPT

## 2021-08-30 PROCEDURE — A9270 NON-COVERED ITEM OR SERVICE: HCPCS | Performed by: INTERNAL MEDICINE

## 2021-08-30 PROCEDURE — 700102 HCHG RX REV CODE 250 W/ 637 OVERRIDE(OP): Performed by: INTERNAL MEDICINE

## 2021-08-30 PROCEDURE — A9270 NON-COVERED ITEM OR SERVICE: HCPCS | Performed by: NURSE PRACTITIONER

## 2021-08-30 PROCEDURE — 85610 PROTHROMBIN TIME: CPT

## 2021-08-30 PROCEDURE — 85007 BL SMEAR W/DIFF WBC COUNT: CPT

## 2021-08-30 PROCEDURE — 700111 HCHG RX REV CODE 636 W/ 250 OVERRIDE (IP): Performed by: NURSE PRACTITIONER

## 2021-08-30 PROCEDURE — 700102 HCHG RX REV CODE 250 W/ 637 OVERRIDE(OP): Performed by: NURSE PRACTITIONER

## 2021-08-30 PROCEDURE — 99233 SBSQ HOSP IP/OBS HIGH 50: CPT | Performed by: NURSE PRACTITIONER

## 2021-08-30 PROCEDURE — 80053 COMPREHEN METABOLIC PANEL: CPT

## 2021-08-30 PROCEDURE — 770004 HCHG ROOM/CARE - ONCOLOGY PRIVATE *

## 2021-08-30 RX ADMIN — HYDROMORPHONE HYDROCHLORIDE 1 MG: 1 INJECTION, SOLUTION INTRAMUSCULAR; INTRAVENOUS; SUBCUTANEOUS at 19:41

## 2021-08-30 RX ADMIN — DEXAMETHASONE 4 MG: 4 TABLET ORAL at 10:12

## 2021-08-30 RX ADMIN — NICOTINE TRANSDERMAL SYSTEM 21 MG: 21 PATCH, EXTENDED RELEASE TRANSDERMAL at 07:30

## 2021-08-30 RX ADMIN — HYDROMORPHONE HYDROCHLORIDE 1 MG: 1 INJECTION, SOLUTION INTRAMUSCULAR; INTRAVENOUS; SUBCUTANEOUS at 20:18

## 2021-08-30 RX ADMIN — DEXAMETHASONE 4 MG: 4 TABLET ORAL at 18:37

## 2021-08-30 RX ADMIN — OXYCODONE HYDROCHLORIDE 10 MG: 10 TABLET ORAL at 18:37

## 2021-08-30 RX ADMIN — HYDROMORPHONE HYDROCHLORIDE 1 MG: 1 INJECTION, SOLUTION INTRAMUSCULAR; INTRAVENOUS; SUBCUTANEOUS at 10:10

## 2021-08-30 RX ADMIN — HYDROMORPHONE HYDROCHLORIDE 1 MG: 1 INJECTION, SOLUTION INTRAMUSCULAR; INTRAVENOUS; SUBCUTANEOUS at 13:21

## 2021-08-30 RX ADMIN — OXYCODONE HYDROCHLORIDE 10 MG: 10 TABLET ORAL at 22:09

## 2021-08-30 RX ADMIN — HYDROMORPHONE HYDROCHLORIDE 1 MG: 1 INJECTION, SOLUTION INTRAMUSCULAR; INTRAVENOUS; SUBCUTANEOUS at 07:35

## 2021-08-30 RX ADMIN — HYDROMORPHONE HYDROCHLORIDE 1 MG: 1 INJECTION, SOLUTION INTRAMUSCULAR; INTRAVENOUS; SUBCUTANEOUS at 02:08

## 2021-08-30 RX ADMIN — HYDROMORPHONE HYDROCHLORIDE 1 MG: 1 INJECTION, SOLUTION INTRAMUSCULAR; INTRAVENOUS; SUBCUTANEOUS at 16:33

## 2021-08-30 ASSESSMENT — ENCOUNTER SYMPTOMS
EYE DISCHARGE: 0
SHORTNESS OF BREATH: 1
DIZZINESS: 0
NAUSEA: 0
BACK PAIN: 1
FEVER: 0
CHILLS: 0
NECK PAIN: 1
MEMORY LOSS: 1
NERVOUS/ANXIOUS: 1
HEADACHES: 1
EYE PAIN: 0
SPUTUM PRODUCTION: 1
COUGH: 1
DIARRHEA: 0
ABDOMINAL PAIN: 0
SORE THROAT: 0
WEAKNESS: 1
VOMITING: 0
MYALGIAS: 1

## 2021-08-30 ASSESSMENT — PAIN DESCRIPTION - PAIN TYPE
TYPE: CHRONIC PAIN

## 2021-08-30 NOTE — CARE PLAN
The patient is Stable - Low risk of patient condition declining or worsening    Shift Goals  Clinical Goals: Pain managment   Patient Goals: reduction of pain/anxiety  Family Goals: not present    Progress made toward(s) clinical / shift goals:  reduction in pain with regular pain medication administration.     Patient is not progressing towards the following goals:

## 2021-08-30 NOTE — CARE PLAN
The patient is Stable - Low risk of patient condition declining or worsening    Shift Goals  Clinical Goals: pain management   Patient Goals: pain management   Family Goals: not present    Problem: Pain - Standard  Goal: Alleviation of pain or a reduction in pain to the patient’s comfort goal  Outcome: Progressing     Problem: Knowledge Deficit - Standard  Goal: Patient and family/care givers will demonstrate understanding of plan of care, disease process/condition, diagnostic tests and medications  Outcome: Progressing

## 2021-08-30 NOTE — PROGRESS NOTES
[facility] Medicine Daily Progress Note    Date of Service  8/30/2021    Chief Complaint  Kayleen Alfredo is a 66 y.o. female admitted 8/28/2021 with intractable back pain possible metastases.     Hospital Course  Patient is a 66-year-old female with no medical history of smoking.  Patient was recently admitted to the hospital 8/22-8/23/21, for similar complaints of intractable back pain.  Patient presented on 8/28/2021 with complaints of intractable back pain to the lumbar region that has been going on for approximately 1 month.  Patient called EMS and was given Tylenol by the ambulance and transported to the emergency room.  Patient left AMA on previous admission however during that admission MRI of lumbar spine demonstrated acute/subacute compression fracture of L2, subacute fracture of L4, compression fracture of L3 and focal lesions on L4 concerning for pathological fracture.  IR had previously been consulted and planned for osteocool RF ablation and biopsies however patient had left AMA.  CT chest abdomen pelvis completed in the emergency room which revealed bilateral pulmonary masses largest located in the right upper lobe measuring 2 x 2 cm possibly representing primary lung cancer with metastatic disease.  Additionally seen are enlarged right hilar and mediastinal lymph nodes consistent with metastatic adenopathy, an ill-defined mixed lytic and sclerotic bony metastasis.       Interval Problem Update  8/29- Patient resting in bed, states significant pain to lower back and continuous cough. Discussed imaging with patient and need for further imaging and biopsies. Patient understands but then asks for repeat of explanation stating she has already forgotten information just discussed. MRI brain ordered given suspected diagnosis and patient inability to retain information. IR has been ordered for possible biopsy tomorrow. Palliative consult placed to discuss advance care planning, goals of care, and pain  management. Have placed transfer orders to Oncology. Dexamethasone added to help with bone pain.   8/30- Patient laying in bed resting on entrance to room. After speaking with patient she started escalating her voice screaming towards provider that she was in pain and just wants to sleep. States she can hear all the noise in the hodge and can not sleep because of it. Attempted to close door to help with noise at which patient again screamed at provider that she wants door left open that she does not care about the noise. Patient then started yelling about light in the room which is coming from a small window that does not have blinds. Patient emotionally labile and forgetful of when she received medications. IR consulted and procedure planned for 9/2- will be NPO 9/2 at midnight. Patient to transfer to Oncology floor, awaiting Palliative care to help with pain management and goals of care.       I have personally seen and examined the patient at bedside. I discussed the plan of care with patient, bedside RN and charge RN.    Consultants/Specialty  None - Will consider Oncology, Radiation Oncology with results of biopsy     Code Status  Full Code    Disposition  Patient is not medically cleared.   Anticipate discharge to unclear at this time.  I have placed the appropriate orders for post-discharge needs.  Transfer to Oncology       Review of Systems  Review of Systems   Constitutional: Positive for malaise/fatigue. Negative for chills and fever.   HENT: Positive for congestion. Negative for sore throat.    Eyes: Negative for pain and discharge.   Respiratory: Positive for cough, sputum production and shortness of breath.    Cardiovascular: Positive for chest pain. Negative for leg swelling.   Gastrointestinal: Negative for abdominal pain, diarrhea, nausea and vomiting.   Genitourinary: Negative for dysuria, frequency and urgency.   Musculoskeletal: Positive for back pain, myalgias and neck pain.   Skin: Negative for  rash.   Neurological: Positive for weakness and headaches. Negative for dizziness.   Psychiatric/Behavioral: Positive for memory loss. The patient is nervous/anxious.         Physical Exam  Temp:  [36.6 °C (97.8 °F)-37.7 °C (99.8 °F)] 36.6 °C (97.8 °F)  Pulse:  [66-78] 66  Resp:  [18] 18  BP: (130-179)/(67-94) 179/94  SpO2:  [92 %] 92 %    Physical Exam  Vitals and nursing note reviewed.   Constitutional:       General: She is awake. She is not in acute distress.     Appearance: She is normal weight.      Comments: Disheveled appearance    HENT:      Head: Normocephalic and atraumatic.      Mouth/Throat:      Lips: Pink.      Mouth: Mucous membranes are moist.      Dentition: Abnormal dentition.   Eyes:      General: Lids are normal.      Conjunctiva/sclera: Conjunctivae normal.   Cardiovascular:      Rate and Rhythm: Normal rate.      Heart sounds: Normal heart sounds.   Pulmonary:      Effort: No respiratory distress.      Breath sounds: Examination of the right-upper field reveals rhonchi. Examination of the left-upper field reveals rhonchi. Examination of the right-lower field reveals decreased breath sounds. Examination of the left-lower field reveals decreased breath sounds. Decreased breath sounds and rhonchi present.   Abdominal:      General: Bowel sounds are normal.      Palpations: Abdomen is soft.      Tenderness: There is no abdominal tenderness.   Musculoskeletal:      Cervical back: Neck supple.      Thoracic back: Tenderness present.      Lumbar back: Tenderness present.   Skin:     General: Skin is warm and dry.   Neurological:      Mental Status: She is alert and oriented to person, place, and time.      Comments: Oriented but forgetful too situation- needs repetitive directions and explanations    Psychiatric:         Attention and Perception: She is inattentive.         Mood and Affect: Mood is anxious. Affect is labile, blunt and angry.         Behavior: Behavior is agitated.         Cognition  and Memory: Cognition is impaired. She exhibits impaired recent memory.         Fluids  No intake or output data in the 24 hours ending 08/30/21 1348    Laboratory  Recent Labs     08/28/21  1826 08/29/21  0231 08/30/21  0843   WBC 6.3 7.4 7.0   RBC 4.97 4.79 5.12   HEMOGLOBIN 15.1 14.6 15.4   HEMATOCRIT 45.6 43.7 47.1*   MCV 91.8 91.2 92.0   MCH 30.4 30.5 30.1   MCHC 33.1* 33.4* 32.7*   RDW 48.9 48.6 48.0   PLATELETCT 195 217 264   MPV 9.4 9.6 9.6     Recent Labs     08/28/21  1826 08/29/21  0231 08/30/21  0843   SODIUM 132* 130* 131*   POTASSIUM 3.5* 3.5* 4.4   CHLORIDE 100 99 100   CO2 21 21 21   GLUCOSE 77 82 129*   BUN 15 17 15   CREATININE 0.80 0.88 0.52   CALCIUM 8.3* 8.2* 8.9     Recent Labs     08/30/21  0843   INR 1.01               Imaging  MR-BRAIN-WITH & W/O   Final Result      1.  No evidence of acute territorial infarct, intracranial hemorrhage or mass lesion.   2.  Mild microangiopathic ischemic change versus demyelination or gliosis.   3.  Pansinusitis.   4.  Bilateral mastoid effusions.      IR-CONSULT AND TREAT    (Results Pending)        Assessment/Plan  * Metastatic disease (HCC)- (present on admission)  Assessment & Plan  Pulmonary mass with apparent mets to spine  IR consulted for biopsy- to be completed on 9/2   IP Palliative consult for goal planning, pain management, and treatment plan  Dexamethasone added to help with bone pain   Symptom management   Will consider Oncology/ Radiation Oncology once biopsy result and imaging complete     Pulmonary mass  Assessment & Plan  Supplemental oxygen as needed   RT protocol  IR biopsy consult placed   As identified on CT  Bilateral pulmonary masses the largest of which is located within the right upper lobe measuring 2 x 2 centimeters in size possibly representing primary lung cancer with metastatic disease. Additionally seen are enlarged right hilar and mediastinal   lymph nodes consistent with metastatic adenopathy.      Lumbar compression fracture  (HCC)- (present on admission)  Assessment & Plan  MRI lumbar demonstrated acute/subacute compression fracture of L2 vertebral body, subacute fracture of L4, compression fracture of L3 as well as focal osseous lesions and L4 and T2 concerning for pathologic fracture.  - IR was consulted and had planned for osteocool RF ablation and biopsy on L2 and L4.    H/O noncompliance with medical treatment, presenting hazards to health  Assessment & Plan  Patient left AMA 5 days ago when pursuing treatment for above  States she will not do so again     Elevated alkaline phosphatase level- (present on admission)  Assessment & Plan  Alk phos 131  Likely relates to osseous mets    Tobacco dependence- (present on admission)  Assessment & Plan  Tobacco cessation counseling and education provided for4 minutes. Nicotine replacement options provided including patch, and further medical treatments including Wellbutrin and Chantix. As well as over the counter options of lozenges and gum.         VTE prophylaxis: enoxaparin ppx    I have performed a physical exam and reviewed and updated ROS and Plan today (8/30/2021). In review of yesterday's note (8/29/2021), there are no changes except as documented above.

## 2021-08-30 NOTE — PROGRESS NOTES
[facility] Medicine Daily Progress Note    Date of Service  8/30/2021    Chief Complaint  Kayleen Alfredo is a 66 y.o. female admitted 8/28/2021 with intractable back pain possible metastases.     Hospital Course  Patient is a 66-year-old female with no medical history of smoking.  Patient was recently admitted to the hospital 8/22-8/23/21, for similar complaints of intractable back pain.  Patient presented on 8/28/2021 with complaints of intractable back pain to the lumbar region that has been going on for approximately 1 month.  Patient called EMS and was given Tylenol by the ambulance and transported to the emergency room.  Patient left AMA on previous admission however during that admission MRI of lumbar spine demonstrated acute/subacute compression fracture of L2, subacute fracture of L4, compression fracture of L3 and focal lesions on L4 concerning for pathological fracture.  IR had previously been consulted and planned for osteocool RF ablation and biopsies however patient had left AMA.  CT chest abdomen pelvis completed in the emergency room which revealed bilateral pulmonary masses largest located in the right upper lobe measuring 2 x 2 cm possibly representing primary lung cancer with metastatic disease.  Additionally seen are enlarged right hilar and mediastinal lymph nodes consistent with metastatic adenopathy, an ill-defined mixed lytic and sclerotic bony metastasis.       Interval Problem Update  8/29- Patient resting in bed, states significant pain to lower back and continuous cough. Discussed imaging with patient and need for further imaging and biopsies. Patient understands but then asks for repeat of explanation stating she has already forgotten information just discussed. MRI brain ordered given suspected diagnosis and patient inability to retain information. IR has been ordered for possible biopsy tomorrow. Palliative consult placed to discuss advance care planning, goals of care, and pain  management. Have placed transfer orders to Oncology. Dexamethasone added to help with bone pain.   8/30- Patient laying in bed resting on entrance to room. After speaking with patient she started escalating her voice screaming towards provider that she was in pain and just wants to sleep. States she can hear all the noise in the hodge and can not sleep because of it. Attempted to close door to help with noise at which patient again screamed at provider that she wants door left open that she does not care about the noise. Patient then started yelling about light in the room which is coming from a small window that does not have blinds. Patient emotionally labile and forgetful of when she received medications. IR consulted and procedure planned for 9/2- will be NPO 9/2 at midnight. Patient to transfer to Oncology floor, awaiting Palliative care to help with pain management and goals of care.       I have personally seen and examined the patient at bedside. I discussed the plan of care with patient, bedside RN and charge RN.    Consultants/Specialty  None - Will consider Oncology, Radiation Oncology with results of biopsy     Code Status  Full Code    Disposition  Patient is not medically cleared.   Anticipate discharge to unclear at this time.  I have placed the appropriate orders for post-discharge needs.  Transfer to Oncology       Review of Systems  Review of Systems   Constitutional: Positive for malaise/fatigue. Negative for chills and fever.   HENT: Positive for congestion. Negative for sore throat.    Eyes: Negative for pain and discharge.   Respiratory: Positive for cough, sputum production and shortness of breath.    Cardiovascular: Positive for chest pain. Negative for leg swelling.   Gastrointestinal: Negative for abdominal pain, diarrhea, nausea and vomiting.   Genitourinary: Negative for dysuria, frequency and urgency.   Musculoskeletal: Positive for back pain, myalgias and neck pain.   Skin: Negative for  rash.   Neurological: Positive for weakness and headaches. Negative for dizziness.   Psychiatric/Behavioral: Positive for memory loss. The patient is nervous/anxious.         Physical Exam  Temp:  [36.6 °C (97.8 °F)-37.7 °C (99.8 °F)] 36.6 °C (97.8 °F)  Pulse:  [66-78] 66  Resp:  [18] 18  BP: (130-179)/(67-94) 179/94  SpO2:  [92 %] 92 %    Physical Exam  Vitals and nursing note reviewed.   Constitutional:       General: She is awake. She is not in acute distress.     Appearance: She is normal weight.      Comments: Disheveled appearance    HENT:      Head: Normocephalic and atraumatic.      Mouth/Throat:      Lips: Pink.      Mouth: Mucous membranes are moist.      Dentition: Abnormal dentition.   Eyes:      General: Lids are normal.      Conjunctiva/sclera: Conjunctivae normal.   Cardiovascular:      Rate and Rhythm: Normal rate.      Heart sounds: Normal heart sounds.   Pulmonary:      Effort: No respiratory distress.      Breath sounds: Examination of the right-upper field reveals rhonchi. Examination of the left-upper field reveals rhonchi. Examination of the right-lower field reveals decreased breath sounds. Examination of the left-lower field reveals decreased breath sounds. Decreased breath sounds and rhonchi present.   Abdominal:      General: Bowel sounds are normal.      Palpations: Abdomen is soft.      Tenderness: There is no abdominal tenderness.   Musculoskeletal:      Cervical back: Neck supple.      Thoracic back: Tenderness present.      Lumbar back: Tenderness present.   Skin:     General: Skin is warm and dry.   Neurological:      Mental Status: She is alert and oriented to person, place, and time.      Comments: Oriented but forgetful too situation- needs repetitive directions and explanations    Psychiatric:         Attention and Perception: She is inattentive.         Mood and Affect: Mood is anxious. Affect is labile, blunt and angry.         Behavior: Behavior is agitated.         Cognition  and Memory: Cognition is impaired. She exhibits impaired recent memory.         Fluids  No intake or output data in the 24 hours ending 08/30/21 1353    Laboratory  Recent Labs     08/28/21  1826 08/29/21  0231 08/30/21  0843   WBC 6.3 7.4 7.0   RBC 4.97 4.79 5.12   HEMOGLOBIN 15.1 14.6 15.4   HEMATOCRIT 45.6 43.7 47.1*   MCV 91.8 91.2 92.0   MCH 30.4 30.5 30.1   MCHC 33.1* 33.4* 32.7*   RDW 48.9 48.6 48.0   PLATELETCT 195 217 264   MPV 9.4 9.6 9.6     Recent Labs     08/28/21  1826 08/29/21  0231 08/30/21  0843   SODIUM 132* 130* 131*   POTASSIUM 3.5* 3.5* 4.4   CHLORIDE 100 99 100   CO2 21 21 21   GLUCOSE 77 82 129*   BUN 15 17 15   CREATININE 0.80 0.88 0.52   CALCIUM 8.3* 8.2* 8.9     Recent Labs     08/30/21  0843   INR 1.01               Imaging  MR-BRAIN-WITH & W/O   Final Result      1.  No evidence of acute territorial infarct, intracranial hemorrhage or mass lesion.   2.  Mild microangiopathic ischemic change versus demyelination or gliosis.   3.  Pansinusitis.   4.  Bilateral mastoid effusions.      IR-CONSULT AND TREAT    (Results Pending)        Assessment/Plan  * Metastatic disease (HCC)- (present on admission)  Assessment & Plan  Pulmonary mass with apparent mets to spine  IR consulted for biopsy- to be completed on 9/2   IP Palliative consult for goal planning, pain management, and treatment plan  Dexamethasone added to help with bone pain   Symptom management   Will consider Oncology/ Radiation Oncology once biopsy result and imaging complete     Pulmonary mass  Assessment & Plan  Supplemental oxygen as needed   RT protocol  IR biopsy consult placed   As identified on CT  Bilateral pulmonary masses the largest of which is located within the right upper lobe measuring 2 x 2 centimeters in size possibly representing primary lung cancer with metastatic disease. Additionally seen are enlarged right hilar and mediastinal   lymph nodes consistent with metastatic adenopathy.      Lumbar compression fracture  (HCC)- (present on admission)  Assessment & Plan  MRI lumbar demonstrated acute/subacute compression fracture of L2 vertebral body, subacute fracture of L4, compression fracture of L3 as well as focal osseous lesions and L4 and T2 concerning for pathologic fracture.  - IR was consulted and had planned for osteocool RF ablation and biopsy on L2 and L4.    H/O noncompliance with medical treatment, presenting hazards to health  Assessment & Plan  Patient left AMA 5 days ago when pursuing treatment for above  States she will not do so again     Elevated alkaline phosphatase level- (present on admission)  Assessment & Plan  Alk phos 131  Likely relates to osseous mets    Tobacco dependence- (present on admission)  Assessment & Plan  Tobacco cessation counseling and education provided for4 minutes. Nicotine replacement options provided including patch, and further medical treatments including Wellbutrin and Chantix. As well as over the counter options of lozenges and gum.         VTE prophylaxis: enoxaparin ppx    I have performed a physical exam and reviewed and updated ROS and Plan today (8/30/2021). In review of yesterday's note (8/29/2021), there are no changes except as documented above.

## 2021-08-30 NOTE — PROGRESS NOTES
Assessment completed.  Pt A&Ox4, irritable. Pt complains of 9/10 pain, medicated per MAR.  POC discussed; communication board updated.    Bed in locked, lowest position.  Call light and belongings within reach.  Needs met.

## 2021-08-30 NOTE — CONSULTS
Radiology Consult  Author: KAELA He Date & Time created: 8/30/2021  12:07 PM   Date of admission  8/28/2021  Note to reader: this note follows the APSO format rather than the historical SOAP format. Assessment and plan located at the top of the note for ease of use.    Chief Complaint  66 y.o. female admitted 8/28/2021 with back pain    HPI  Patient is a 66-year-old female with no medical history of smoking.  Patient was recently admitted to the hospital 8/22-8/23/21, for similar complaints of intractable back pain.  Patient presented on 8/28/2021 with complaints of intractable back pain to the lumbar region that has been going on for approximately 1 month.  Patient called EMS and was given Tylenol by the ambulance and transported to the emergency room.  Patient left AMA on previous admission however during that admission MRI of lumbar spine demonstrated acute/subacute compression fracture of L2, subacute fracture of L4, compression fracture of L3 and focal lesions on L4 concerning for pathological fracture. IR had previously been consulted and planned for osteocool RF ablation and biopsies however patient had left AMA.      CT chest abdomen pelvis completed in the emergency room which revealed bilateral pulmonary masses largest located in the right upper lobe measuring 2 x 2 cm possibly representing primary lung cancer with metastatic disease.  Additionally seen are enlarged right hilar and mediastinal lymph nodes consistent with metastatic adenopathy, an ill-defined mixed lytic and sclerotic bony metastasis  Assessment/Plan  Interval History   Principal Problem:    Metastatic disease (HCC)  Active Problems:    Lumbar compression fracture (HCC)    Tobacco dependence    Elevated alkaline phosphatase level    Pulmonary mass    H/O noncompliance with medical treatment, presenting hazards to health      Plan IR  - IR Osteocool RF ablation + BX on L2 and L4 with GA scheduled for 9/2  - NPO at midnight  scheduled for 9/2  - Hold blood thinners/ ASA   - Went over the risks, benefits, and alternatives of aforementioned procedures were discussed with patient in detail before proceeding.  Patient was given opportunities to ask questions and discuss other options.  Risks including but not limited to perforation, infection, bleeding, missed lesion(s), possible need for surgery(ies) and/or interventional radiology, possible need for repeat procedure(s) and/or additional testing, hospitalization possibly prolonged,  pulmonary event, aspiration, hypoxia,  medication (s) and/or anesthesia reaction(s), indefinite diagnosis, discomfort/pain, unsuccessful and/or incomplete procedure, ineffective therapy, persistent symptoms, damage to adjacent organs/structure spinal cord or nerve roots and/or vascular, damage to surrounding tissue through iatrogenic injury, and other adverse event(s) possibly life-threatening.  Interactive discussion was undertaken with Layman's terms.  I answered questions in full and to satisfaction.  Patient stated understanding and acceptance of these risks, and wished to proceed.      [id number]       IR:            Review of Systems  Physical Exam   ROS   Review of Systems   Constitutional: Negative for chills and fever.   HENT: Negative for hearing loss.    Eyes: Negative for blurred vision.   Respiratory: Negative for cough, hemoptysis and shortness of breath.    Cardiovascular: Negative for chest pain and palpitations.   Gastrointestinal: Negative for abdominal pain and vomiting.   Genitourinary: Negative for dysuria.   Musculoskeletal: Positive for back pain. Negative for myalgias.   Skin: Negative for rash.   Neurological: Positive for weakness. Negative for dizziness and headaches.   Endo/Heme/Allergies: Does not bruise/bleed easily.   Psychiatric/Behavioral: Negative for suicidal ideas.   Vitals:    08/30/21 0740   BP: 130/67   Pulse: 78   Resp: 18   Temp: 37.7 °C (99.8 °F)   SpO2: 92%      Physical  Exam  Constitutional:       General: She is in acute distress.      Comments: Back pain   HENT:      Head: Normocephalic.      Nose: Nose normal.      Mouth/Throat:      Mouth: Mucous membranes are moist.   Eyes:      Pupils: Pupils are equal, round, and reactive to light.   Cardiovascular:      Rate and Rhythm: Normal rate.   Pulmonary:      Effort: Pulmonary effort is normal. No respiratory distress.   Abdominal:      Palpations: Abdomen is soft.      Tenderness: There is no abdominal tenderness.   Musculoskeletal:         General: No deformity.      Cervical back: Normal range of motion.      Thoracic back: Tenderness present. Decreased range of motion.      Lumbar back: Spasms and tenderness present. Decreased range of motion.   Skin:     General: Skin is warm and dry.      Capillary Refill: Capillary refill takes less than 2 seconds.      Coloration: Skin is not jaundiced or pale.   Neurological:      General: No focal deficit present.      Mental Status: She is alert.      Motor: No weakness.   Psychiatric:         Mood and Affect: Mood normal.         Behavior: Behavior normal.             Labs    Recent Labs     08/28/21  1826 08/29/21  0231 08/30/21  0843   WBC 6.3 7.4 7.0   RBC 4.97 4.79 5.12   HEMOGLOBIN 15.1 14.6 15.4   HEMATOCRIT 45.6 43.7 47.1*   MCV 91.8 91.2 92.0   MCH 30.4 30.5 30.1   MCHC 33.1* 33.4* 32.7*   RDW 48.9 48.6 48.0   PLATELETCT 195 217 264   MPV 9.4 9.6 9.6     Recent Labs     08/28/21  1826 08/29/21  0231 08/30/21  0843   SODIUM 132* 130* 131*   POTASSIUM 3.5* 3.5* 4.4   CHLORIDE 100 99 100   CO2 21 21 21   GLUCOSE 77 82 129*   BUN 15 17 15   CREATININE 0.80 0.88 0.52   CALCIUM 8.3* 8.2* 8.9     MR-BRAIN-WITH & W/O   Final Result      1.  No evidence of acute territorial infarct, intracranial hemorrhage or mass lesion.   2.  Mild microangiopathic ischemic change versus demyelination or gliosis.   3.  Pansinusitis.   4.  Bilateral mastoid effusions.      IR-CONSULT AND TREAT    (Results  Pending)     Recent Labs     08/28/21  1826 08/29/21  0231 08/30/21  0843   SODIUM 132* 130* 131*   POTASSIUM 3.5* 3.5* 4.4   CHLORIDE 100 99 100   CO2 21 21 21   GLUCOSE 77 82 129*   BUN 15 17 15     INR   Date Value Ref Range Status   08/30/2021 1.01 0.87 - 1.13 Final     Comment:     INR - Non-therapeutic Reference Range: 0.87-1.13  INR - Therapeutic Reference Range: 2.0-4.0       No results found for: POCINR   No intake or output data in the 24 hours ending 08/30/21 1207   Labs not explicitly included in this progress note were reviewed by the author. Radiology/imaging not explicitly included in this progress note was reviewed by the author.     History reviewed. No pertinent past medical history.     Home Medications    Not on File     I have performed a physical exam and reviewed and updated ROS and Plan today (8/30/2021).     25 minutes in directly providing and coordinating care and extensive data review.  No time overlap and excludes procedures.

## 2021-08-31 PROCEDURE — 700111 HCHG RX REV CODE 636 W/ 250 OVERRIDE (IP): Performed by: INTERNAL MEDICINE

## 2021-08-31 PROCEDURE — 700102 HCHG RX REV CODE 250 W/ 637 OVERRIDE(OP): Performed by: INTERNAL MEDICINE

## 2021-08-31 PROCEDURE — A9270 NON-COVERED ITEM OR SERVICE: HCPCS | Performed by: NURSE PRACTITIONER

## 2021-08-31 PROCEDURE — 700102 HCHG RX REV CODE 250 W/ 637 OVERRIDE(OP): Performed by: NURSE PRACTITIONER

## 2021-08-31 PROCEDURE — 700111 HCHG RX REV CODE 636 W/ 250 OVERRIDE (IP): Performed by: NURSE PRACTITIONER

## 2021-08-31 PROCEDURE — A9270 NON-COVERED ITEM OR SERVICE: HCPCS | Performed by: INTERNAL MEDICINE

## 2021-08-31 PROCEDURE — 770004 HCHG ROOM/CARE - ONCOLOGY PRIVATE *

## 2021-08-31 PROCEDURE — 99233 SBSQ HOSP IP/OBS HIGH 50: CPT | Performed by: INTERNAL MEDICINE

## 2021-08-31 RX ORDER — HYDROMORPHONE HYDROCHLORIDE 1 MG/ML
1 INJECTION, SOLUTION INTRAMUSCULAR; INTRAVENOUS; SUBCUTANEOUS
Status: DISCONTINUED | OUTPATIENT
Start: 2021-08-31 | End: 2021-09-05

## 2021-08-31 RX ADMIN — HYDROMORPHONE HYDROCHLORIDE 1 MG: 1 INJECTION, SOLUTION INTRAMUSCULAR; INTRAVENOUS; SUBCUTANEOUS at 16:31

## 2021-08-31 RX ADMIN — DEXAMETHASONE 4 MG: 4 TABLET ORAL at 06:30

## 2021-08-31 RX ADMIN — NICOTINE POLACRILEX 2 MG: 2 GUM, CHEWING BUCCAL at 16:21

## 2021-08-31 RX ADMIN — HYDROMORPHONE HYDROCHLORIDE 1 MG: 1 INJECTION, SOLUTION INTRAMUSCULAR; INTRAVENOUS; SUBCUTANEOUS at 13:41

## 2021-08-31 RX ADMIN — HYDROMORPHONE HYDROCHLORIDE 1 MG: 1 INJECTION, SOLUTION INTRAMUSCULAR; INTRAVENOUS; SUBCUTANEOUS at 18:38

## 2021-08-31 RX ADMIN — HYDROMORPHONE HYDROCHLORIDE 1 MG: 1 INJECTION, SOLUTION INTRAMUSCULAR; INTRAVENOUS; SUBCUTANEOUS at 06:30

## 2021-08-31 RX ADMIN — HYDROMORPHONE HYDROCHLORIDE 1 MG: 1 INJECTION, SOLUTION INTRAMUSCULAR; INTRAVENOUS; SUBCUTANEOUS at 21:56

## 2021-08-31 RX ADMIN — NICOTINE TRANSDERMAL SYSTEM 21 MG: 21 PATCH, EXTENDED RELEASE TRANSDERMAL at 06:30

## 2021-08-31 RX ADMIN — DEXAMETHASONE 4 MG: 4 TABLET ORAL at 18:37

## 2021-08-31 RX ADMIN — HYDROMORPHONE HYDROCHLORIDE 1 MG: 1 INJECTION, SOLUTION INTRAMUSCULAR; INTRAVENOUS; SUBCUTANEOUS at 11:55

## 2021-08-31 RX ADMIN — OXYCODONE HYDROCHLORIDE 10 MG: 10 TABLET ORAL at 09:37

## 2021-08-31 ASSESSMENT — PAIN DESCRIPTION - PAIN TYPE
TYPE: CHRONIC PAIN

## 2021-08-31 ASSESSMENT — ENCOUNTER SYMPTOMS
RESPIRATORY NEGATIVE: 1
NEUROLOGICAL NEGATIVE: 1
PSYCHIATRIC NEGATIVE: 1
BACK PAIN: 1
MYALGIAS: 1
EYES NEGATIVE: 1
GASTROINTESTINAL NEGATIVE: 1
CARDIOVASCULAR NEGATIVE: 1

## 2021-08-31 NOTE — PROGRESS NOTES
Hospital Medicine Daily Progress Note    Date of Service  8/31/2021    Chief Complaint  Kayleen Alfredo is a 66 y.o. female admitted 8/28/2021 with intractable back pain.  She was also admitted on 8/22/2021 to 8/23/2021 with the same complaint, and left AMA. CT abdomen and pelvis from 9/21/2021 was unremarkable.    CT L-spine from 8/22/2021 showed a subacute compression fracture at L2, L3, L4. She was also noted to have areas of patchy sclerosis involving all lumbar levels as well as a lucent area involving the right ilium, suspicious for metastatic disease.     CT chest, abdomen, pelvis with contrast from 8/23/2021 showed bilateral pulmonary masses (largest measuring 2 x 2 cm) likely representing primary lung cancer with metastatic disease.  She had enlarged right hilar and mediastinal lymph nodes consistent with metastatic adenopathy.    MR lumbar spine from 8/23/2001 showed acute/subacute compression fracture of L2, L4, and L3, an approximately 10 mm peripherally enhancing lesion in the L4 vertebral body, focal T2 hyper intense lesions in the iliac bones concerning for osseous lesions with pathological fractures. IR were consulted for ostial cool RF ablation with biopsies, patient left AMA.     Hospital Course  MRI brain from 8/29/2021 showed no evidence of acute infarct, intracranial hemorrhage or mass lesion.  Dexamethasone was started for pain.    Interval Problem Update  Patient has had labile mood with episodes of irritability.  IR were consulted for possible bone biopsy, which has been planned for 9/2/2021.    I have personally seen and examined the patient at bedside. I discussed the plan of care with patient and bedside RN.    Consultants/Specialty  IR    Code Status  Full Code    Disposition  Patient is not medically cleared.   Anticipate discharge to to home with close outpatient follow-up.  I have placed the appropriate orders for post-discharge needs.    Review of Systems  Review of Systems    Constitutional: Positive for malaise/fatigue.   HENT: Negative.    Eyes: Negative.    Respiratory: Negative.    Cardiovascular: Negative.    Gastrointestinal: Negative.    Genitourinary: Negative.    Musculoskeletal: Positive for back pain and myalgias.   Skin: Negative.    Neurological: Negative.    Endo/Heme/Allergies: Negative.    Psychiatric/Behavioral: Negative.         Physical Exam  Temp:  [36.4 °C (97.5 °F)-36.7 °C (98 °F)] 36.7 °C (98 °F)  Pulse:  [60-68] 60  Resp:  [18] 18  BP: (132-144)/(74-84) 141/74  SpO2:  [90 %] 90 %    Physical Exam  Constitutional:       General: She is in acute distress.   HENT:      Nose: Nose normal.      Mouth/Throat:      Mouth: Mucous membranes are moist.   Eyes:      Pupils: Pupils are equal, round, and reactive to light.   Cardiovascular:      Rate and Rhythm: Normal rate.   Pulmonary:      Comments: Coarse  Abdominal:      Palpations: Abdomen is soft.   Musculoskeletal:      Cervical back: Normal range of motion.      Right lower leg: No edema.      Left lower leg: No edema.   Neurological:      Mental Status: She is alert. Mental status is at baseline.   Psychiatric:         Mood and Affect: Mood normal.         Fluids  No intake or output data in the 24 hours ending 08/31/21 1441    Laboratory  Recent Labs     08/28/21 1826 08/29/21  0231 08/30/21  0843   WBC 6.3 7.4 7.0   RBC 4.97 4.79 5.12   HEMOGLOBIN 15.1 14.6 15.4   HEMATOCRIT 45.6 43.7 47.1*   MCV 91.8 91.2 92.0   MCH 30.4 30.5 30.1   MCHC 33.1* 33.4* 32.7*   RDW 48.9 48.6 48.0   PLATELETCT 195 217 264   MPV 9.4 9.6 9.6     Recent Labs     08/28/21 1826 08/29/21  0231 08/30/21  0843   SODIUM 132* 130* 131*   POTASSIUM 3.5* 3.5* 4.4   CHLORIDE 100 99 100   CO2 21 21 21   GLUCOSE 77 82 129*   BUN 15 17 15   CREATININE 0.80 0.88 0.52   CALCIUM 8.3* 8.2* 8.9     Recent Labs     08/30/21  0843   INR 1.01               Imaging  MR-BRAIN-WITH & W/O   Final Result      1.  No evidence of acute territorial infarct,  intracranial hemorrhage or mass lesion.   2.  Mild microangiopathic ischemic change versus demyelination or gliosis.   3.  Pansinusitis.   4.  Bilateral mastoid effusions.      IR-KYPHOPLASTY,1 VERTEBRA,LUMBAR    (Results Pending)        Assessment/Plan  * Metastatic disease (HCC)- (present on admission)  Assessment & Plan  Pulmonary mass with suspected spinal lesions.  IR consulted for biopsy, scheduled for 9/2/2021.  Continue pain medications and dexamethasone.    Pulmonary mass  Assessment & Plan  CT chest, abdomen, pelvis with contrast from 8/23/2021 showed bilateral pulmonary masses (largest measuring 2 x 2 cm) likely representing primary lung cancer with metastatic disease. She had enlarged right hilar and mediastinal lymph nodes consistent with metastatic adenopathy.  IR consulted for biopsy    Lumbar compression fracture (HCC)- (present on admission)  Assessment & Plan  MR lumbar spine from 8/23/2001 showed acute/subacute compression fracture of L2, L4, and L3, an approximately 10 mm peripherally enhancing lesion in the L4 vertebral body, focal T2 hyper intense lesions in the iliac bones concerning for osseous lesions with pathological fractures. IR were consulted for ostial cool RF ablation with biopsies, patient left AMA.     H/O noncompliance with medical treatment, presenting hazards to health  Assessment & Plan  Recently left AGAINST MEDICAL ADVICE.  Continue to  on importance of adhering to medical recommendations and treatment.    Elevated alkaline phosphatase level- (present on admission)  Assessment & Plan  Likely due to metastatic disease.  Monitor    Tobacco dependence- (present on admission)  Assessment & Plan  Patient received counseling on tobacco cessation on admission.  Continue to  on quitting tobacco.        VTE prophylaxis: enoxaparin ppx

## 2021-08-31 NOTE — PROGRESS NOTES
Pt to transfer to [facility]-2, pt updated. Telephone report given to receiving RNHenry

## 2021-08-31 NOTE — PROGRESS NOTES
Received report from night shift RN and assumed care of patient (pt). Pt is A&O x 4. Pt reports 8/10 pain in her lower back. Pt medicated per MAR. Pt was not agreeable to starting with oxycodone and wanted to go straight to IV dilaudid. Discussed with MD who agreed and adjusted MAR. Pt has episodes of irritability and has reservations about doing the Covid swab in preparation for her surgery on 9/2/21. RN educated that procedure will not be done without a negative Covid result. Pt is scheduled for kyphoplasty and bone marrow biopsy at 1000 on 9/2/21 and will need to be NPO at midnight as well as have lovenox held on that morning. Plan of care discussed and all questions answered. No other signs of distress at this time. Bed is in lowest, locked position, call light and belongings are within reach. Pt calls for assistance and bed alarm is off.  All other needs met.

## 2021-08-31 NOTE — CARE PLAN
The patient is Stable - Low risk of patient condition declining or worsening    Shift Goals  Clinical Goals: pain control  Patient Goals: rest  Family Goals: not present    Progress made toward(s) clinical / shift goals:      Patient is not progressing towards the following goals:

## 2021-08-31 NOTE — DISCHARGE PLANNING
Care Transition Team Assessment      Anticipated Discharge Disposition: TBD, patient is homeless, anticipated she would qualify for Medicaid Group Home Waiver, she does have some behavioral issues that could be a barrier for discharge to , (hoping once her pain is controlled this will decrease).    Action: Patient was discussed at IDT rounds, patient is a readmission form 08/22-08/23 for similar complaints of intractable back pain. She is homeless and has been staying at the [facility]. Palliative has been consulted.     Barriers to Discharge: Medical clearance, patient is scheduled for a procedure on 09/02/21    Plan: HCM available for all discharge planning needs.           Information Source  Orientation Level: Oriented X4  Information Given By: Other (Comments) (Chart review)  Informant's Name:  (N/A)  Who is responsible for making decisions for patient? : Patient    Readmission Evaluation  Is this a readmission?: Yes - unplanned readmission    Elopement Risk  Legal Hold: No  Ambulatory or Self Mobile in Wheelchair: No-Not an Elopement Risk  Disoriented: No  Psychiatric Symptoms: None  History of Wandering: No  Elopement this Admit: No  Vocalizing Wanting to Leave: No  Displays Behaviors, Body Language Wanting to Leave: No-Not at Risk for Elopement  Elopement Risk: Not at Risk for Elopement    Interdisciplinary Discharge Planning  Patient or legal guardian wants to designate a caregiver: No    Discharge Preparedness  What is your plan after discharge?: Uncertain - pending medical team collaboration  What are your discharge supports?: Other (comment) (has a daughter, does not live in [place])  Prior Functional Level: Ambulatory  Difficulity with IADLs: Cooking, Driving, Laundry, Shopping  Difficulity with IADL Comments:  (homeless)    Functional Assesment  Prior Functional Level: Ambulatory    Finances  Financial Barriers to Discharge: Yes    Vision / Hearing Impairment  Vision Impairment : Yes  Right  Eye Vision: Wears Glasses  Left Eye Vision: Wears Glasses  Hearing Impairment : No         Advance Directive  Advance Directive?: None  Advance Directive offered?: AD Booklet refused    Domestic Abuse  Have you ever been the victim of abuse or violence?: No  Physical Abuse or Sexual Abuse: No  Verbal Abuse or Emotional Abuse: No  Possible Abuse/Neglect Reported to:: Not Applicable         Discharge Risks or Barriers  Discharge risks or barriers?: Complex medical needs  Patient risk factors: Cognitive / sensory / physical deficit, Complex medical needs, Homeless, Lack of outside supports, Readmission, Uninsured or underinsured, Vulnerable adult    Anticipated Discharge Information  Discharge Address:  (TBD, patient is currently homeless)

## 2021-08-31 NOTE — CONSULTS
"Reason for PC Consult: Advance Care Planning    Consulted by: HALLE Holland    Assessment:  General: Ms. Alfredo is a 66 year old female pt with a PMH of smoking who presented to the ED with [facility] on 8/28/21 with back pain x 1 month. She was here earlier in the month (8/22/21) as well and had an MRI which identified subacute compression fractures of the L2, L3, and L4 vertebral bodies concerning for pathological fractures as well as lesions in the L4 vertebral body and iliac bones concerning for metasasis. Pt left AMA prior to her planned biopsies but returned due to persistent back pain. She is again pending osteocool RF ablation and biopsies on 9/2. CT of the chest, abdomen, and pelvis on 8/23/21 also noted bilateral pulmonary masses with the largest in the RUL measuring 2x2cm, indicating possible primary lung cancer. There were also enlarged mediastinal lymph nodes identified.    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 Patient is originally from [place], [place]. She reports that she was \"dumped\" here by her daughter some time ago. She is homeless. She is . She has only one daughter, Patricia Alfredo. She is estranged from her and would not like us to try to track her down at this time.    Consults: IR    Dyspnea: No    Last BM:  (pta)    Pain: Yes- lower back pain - L2, L3, and L4 compression fractures   Depression: Mood appropriate for situation  Dementia: No    Spiritual:  Is Buddhist or spirituality important for coping with this illness? Yes  Has a  or spiritual provider visit been requested? No    Palliative Performance Scale: 80%    Advance Directive: Living Will/Physician declaration obtained at this encounter  DPOA:  N/A  POLST: Yes (obtained at this encounter): DNR, selective treatment, artificial nutrition only as a bridge to getting better    Code Status: DNR- DNI    Outcome:  PC RNs Anay and Ursula met with patient at her bedside. Introduced selves and the role of " "palliative care. Ms. Alfredo updated RN on the social and medical history as noted above. She demonstrates a good understanding of the current clinical picture. She states \"I am probably dying of cancer, they think I have it in in my spine.\" Patient is homeless and understands that this may complicate her outpatient treatment options. She is agreeable to staying here in the hospital during this admission for further evaluation/treatment (previously left AMA to smoke). She does have a daughter, Patricia Alfredo, from whom she is estranged. She notes \"I don't even know if she is dead or alive, I hope she is alive.\" She does not want us to try to track down her daughter at this time.    Explored patient’s expressed values, beliefs, and preferences in order to identify GOC. Patient reports that she is agreeable to some treatment but she would likely decline chemotherapy if it were offered. Her comfort/pain relief is her biggest goal at this time. She reports that her pain is well controlled on the current regimen when she and the nurses stay on top of it.     Patient notes that she has no family or friends that she would trust to make her medical decisions for her. She is very clear that when she is at the end of her life, she would like comfort to be the main focus. As such, declaration was completed. Please refer to declaration in ACP documents (in Epic may hover over code status to see all ACP documents).     We discussed code status in detail as well. Patient decided that should her heart/breathing stop, she would like to be allowed to die naturally rather than to have resuscitation attempted or to be \"hooked to machines.\"  POLST completed to reflect DNAR status, selective treatment, and artificial nutrition only as a bridge to getting better.    Throughout the conversation, PC RN provided therapeutic communication including open ended questions and reflective listening. All questions answered and concerns " addressed. PC RN contact information provided should any additional questions arise. PC will continue to follow throughout this hospitalization as clinical picture evolves.    Recommendations: I do not recommend an ethics or hospice consult at this time because patient is pursuing selective trement and awaiting biopsy results..    Updated: MD    Plan: POLST/Declaration completed. Continue to follow clinical picture and provide support.    Thank you for allowing Palliative Care to participate in this patient's care. Please feel free to call [phone] with any questions or concerns.

## 2021-09-01 LAB
ANION GAP SERPL CALC-SCNC: 10 MMOL/L (ref 7–16)
BASOPHILS # BLD AUTO: 0 % (ref 0–1.8)
BASOPHILS # BLD: 0 K/UL (ref 0–0.12)
BUN SERPL-MCNC: 19 MG/DL (ref 8–22)
CALCIUM SERPL-MCNC: 8.9 MG/DL (ref 8.5–10.5)
CHLORIDE SERPL-SCNC: 102 MMOL/L (ref 96–112)
CO2 SERPL-SCNC: 25 MMOL/L (ref 20–33)
CREAT SERPL-MCNC: 0.69 MG/DL (ref 0.5–1.4)
EOSINOPHIL # BLD AUTO: 0 K/UL (ref 0–0.51)
EOSINOPHIL NFR BLD: 0 % (ref 0–6.9)
ERYTHROCYTE [DISTWIDTH] IN BLOOD BY AUTOMATED COUNT: 49.1 FL (ref 35.9–50)
GLUCOSE SERPL-MCNC: 113 MG/DL (ref 65–99)
HCT VFR BLD AUTO: 45.4 % (ref 37–47)
HGB BLD-MCNC: 14.5 G/DL (ref 12–16)
LYMPHOCYTES # BLD AUTO: 0.41 K/UL (ref 1–4.8)
LYMPHOCYTES NFR BLD: 3.6 % (ref 22–41)
MANUAL DIFF BLD: NORMAL
MCH RBC QN AUTO: 30.1 PG (ref 27–33)
MCHC RBC AUTO-ENTMCNC: 31.9 G/DL (ref 33.6–35)
MCV RBC AUTO: 94.4 FL (ref 81.4–97.8)
METAMYELOCYTES NFR BLD MANUAL: 0.9 %
MONOCYTES # BLD AUTO: 0.84 K/UL (ref 0–0.85)
MONOCYTES NFR BLD AUTO: 7.3 % (ref 0–13.4)
MORPHOLOGY BLD-IMP: NORMAL
MYELOCYTES NFR BLD MANUAL: 0.9 %
NEUTROPHILS # BLD AUTO: 10.04 K/UL (ref 2–7.15)
NEUTROPHILS NFR BLD: 86.4 % (ref 44–72)
NEUTS BAND NFR BLD MANUAL: 0.9 % (ref 0–10)
NRBC # BLD AUTO: 0 K/UL
NRBC BLD-RTO: 0 /100 WBC
PLATELET # BLD AUTO: 316 K/UL (ref 164–446)
PLATELET BLD QL SMEAR: NORMAL
PMV BLD AUTO: 9.6 FL (ref 9–12.9)
POTASSIUM SERPL-SCNC: 4.2 MMOL/L (ref 3.6–5.5)
RBC # BLD AUTO: 4.81 M/UL (ref 4.2–5.4)
RBC BLD AUTO: NORMAL
SODIUM SERPL-SCNC: 137 MMOL/L (ref 135–145)
WBC # BLD AUTO: 11.5 K/UL (ref 4.8–10.8)

## 2021-09-01 PROCEDURE — 770004 HCHG ROOM/CARE - ONCOLOGY PRIVATE *

## 2021-09-01 PROCEDURE — 700102 HCHG RX REV CODE 250 W/ 637 OVERRIDE(OP): Performed by: INTERNAL MEDICINE

## 2021-09-01 PROCEDURE — 36415 COLL VENOUS BLD VENIPUNCTURE: CPT

## 2021-09-01 PROCEDURE — 85007 BL SMEAR W/DIFF WBC COUNT: CPT

## 2021-09-01 PROCEDURE — A9270 NON-COVERED ITEM OR SERVICE: HCPCS | Performed by: INTERNAL MEDICINE

## 2021-09-01 PROCEDURE — 700111 HCHG RX REV CODE 636 W/ 250 OVERRIDE (IP): Performed by: INTERNAL MEDICINE

## 2021-09-01 PROCEDURE — 700111 HCHG RX REV CODE 636 W/ 250 OVERRIDE (IP): Performed by: STUDENT IN AN ORGANIZED HEALTH CARE EDUCATION/TRAINING PROGRAM

## 2021-09-01 PROCEDURE — A9270 NON-COVERED ITEM OR SERVICE: HCPCS | Performed by: NURSE PRACTITIONER

## 2021-09-01 PROCEDURE — 80048 BASIC METABOLIC PNL TOTAL CA: CPT

## 2021-09-01 PROCEDURE — 700102 HCHG RX REV CODE 250 W/ 637 OVERRIDE(OP): Performed by: NURSE PRACTITIONER

## 2021-09-01 PROCEDURE — 85027 COMPLETE CBC AUTOMATED: CPT

## 2021-09-01 PROCEDURE — 99233 SBSQ HOSP IP/OBS HIGH 50: CPT | Performed by: INTERNAL MEDICINE

## 2021-09-01 RX ORDER — SODIUM CHLORIDE, SODIUM LACTATE, POTASSIUM CHLORIDE, CALCIUM CHLORIDE 600; 310; 30; 20 MG/100ML; MG/100ML; MG/100ML; MG/100ML
INJECTION, SOLUTION INTRAVENOUS CONTINUOUS
Status: DISCONTINUED | OUTPATIENT
Start: 2021-09-01 | End: 2021-09-04

## 2021-09-01 RX ORDER — ENALAPRILAT 1.25 MG/ML
1.25 INJECTION INTRAVENOUS EVERY 6 HOURS PRN
Status: DISCONTINUED | OUTPATIENT
Start: 2021-09-01 | End: 2021-09-11 | Stop reason: HOSPADM

## 2021-09-01 RX ADMIN — DEXAMETHASONE 4 MG: 4 TABLET ORAL at 18:37

## 2021-09-01 RX ADMIN — HYDROMORPHONE HYDROCHLORIDE 1 MG: 1 INJECTION, SOLUTION INTRAMUSCULAR; INTRAVENOUS; SUBCUTANEOUS at 14:18

## 2021-09-01 RX ADMIN — HYDROMORPHONE HYDROCHLORIDE 1 MG: 1 INJECTION, SOLUTION INTRAMUSCULAR; INTRAVENOUS; SUBCUTANEOUS at 05:09

## 2021-09-01 RX ADMIN — HYDROMORPHONE HYDROCHLORIDE 1 MG: 1 INJECTION, SOLUTION INTRAMUSCULAR; INTRAVENOUS; SUBCUTANEOUS at 00:16

## 2021-09-01 RX ADMIN — DEXAMETHASONE 4 MG: 4 TABLET ORAL at 05:09

## 2021-09-01 RX ADMIN — HYDROMORPHONE HYDROCHLORIDE 1 MG: 1 INJECTION, SOLUTION INTRAMUSCULAR; INTRAVENOUS; SUBCUTANEOUS at 09:58

## 2021-09-01 RX ADMIN — DOCUSATE SODIUM 50 MG AND SENNOSIDES 8.6 MG 2 TABLET: 8.6; 5 TABLET, FILM COATED ORAL at 05:09

## 2021-09-01 RX ADMIN — HYDROMORPHONE HYDROCHLORIDE 1 MG: 1 INJECTION, SOLUTION INTRAMUSCULAR; INTRAVENOUS; SUBCUTANEOUS at 18:37

## 2021-09-01 RX ADMIN — HYDROMORPHONE HYDROCHLORIDE 1 MG: 1 INJECTION, SOLUTION INTRAMUSCULAR; INTRAVENOUS; SUBCUTANEOUS at 02:46

## 2021-09-01 RX ADMIN — NICOTINE TRANSDERMAL SYSTEM 21 MG: 21 PATCH, EXTENDED RELEASE TRANSDERMAL at 05:08

## 2021-09-01 RX ADMIN — ENALAPRILAT 1.25 MG: 1.25 INJECTION INTRAVENOUS at 20:49

## 2021-09-01 RX ADMIN — HYDROMORPHONE HYDROCHLORIDE 1 MG: 1 INJECTION, SOLUTION INTRAMUSCULAR; INTRAVENOUS; SUBCUTANEOUS at 20:46

## 2021-09-01 ASSESSMENT — PAIN DESCRIPTION - PAIN TYPE
TYPE: ACUTE PAIN
TYPE: CHRONIC PAIN
TYPE: CHRONIC PAIN
TYPE: ACUTE PAIN
TYPE: CHRONIC PAIN
TYPE: ACUTE PAIN
TYPE: CHRONIC PAIN
TYPE: ACUTE PAIN
TYPE: ACUTE PAIN;CHRONIC PAIN
TYPE: ACUTE PAIN

## 2021-09-01 ASSESSMENT — COGNITIVE AND FUNCTIONAL STATUS - GENERAL
DAILY ACTIVITIY SCORE: 24
SUGGESTED CMS G CODE MODIFIER MOBILITY: CH
SUGGESTED CMS G CODE MODIFIER DAILY ACTIVITY: CH
MOBILITY SCORE: 24

## 2021-09-01 NOTE — PROGRESS NOTES
Hospital Medicine Daily Progress Note    Date of Service  9/1/2021    Chief Complaint  Kayleen Alfredo is a 66 y.o. female admitted 8/28/2021 with intractable back pain.  She was also admitted on 8/22/2021 to 8/23/2021 with the same complaint, and left AMA. CT abdomen and pelvis from 9/21/2021 was unremarkable.     CT L-spine from 8/22/2021 showed a subacute compression fracture at L2, L3, L4. She was also noted to have areas of patchy sclerosis involving all lumbar levels as well as a lucent area involving the right ilium, suspicious for metastatic disease.      CT chest, abdomen, pelvis with contrast from 8/23/2021 showed bilateral pulmonary masses (largest measuring 2 x 2 cm) likely representing primary lung cancer with metastatic disease.  She had enlarged right hilar and mediastinal lymph nodes consistent with metastatic adenopathy.     MR lumbar spine from 8/23/2001 showed acute/subacute compression fracture of L2, L4, and L3, an approximately 10 mm peripherally enhancing lesion in the L4 vertebral body, focal T2 hyper intense lesions in the iliac bones concerning for osseous lesions with pathological fractures. IR were consulted for ostial cool RF ablation with biopsies, patient left AMA.      Hospital Course  MRI brain from 8/29/2021 showed no evidence of acute infarct, intracranial hemorrhage or mass lesion.  Dexamethasone was started for pain.     Interval Problem Update  Scheduled for IR bone biopsy and kyphoplasty on 9/2/2021. Hold Lovenox tomorrow AM, NPO at midnight. IVF at midnight ordered     I have personally seen and examined the patient at bedside. I discussed the plan of care with patient and bedside RN.     Consultants/Specialty  IR     Code Status  Full Code     Disposition  Patient is not medically cleared.   Anticipate discharge to to home with close outpatient follow-up.  I have placed the appropriate orders for post-discharge needs.     Review of Systems  Review of Systems   Constitutional:  Positive for malaise/fatigue.   HENT: Negative.    Eyes: Negative.    Respiratory: Negative.    Cardiovascular: Negative.    Gastrointestinal: Negative.    Genitourinary: Negative.    Musculoskeletal: Positive for back pain and myalgias.   Skin: Negative.    Neurological: Negative.    Endo/Heme/Allergies: Negative.    Psychiatric/Behavioral: Negative.        Physical Exam  Temp:  [36.2 °C (97.2 °F)-36.8 °C (98.2 °F)] 36.4 °C (97.5 °F)  Pulse:  [68-70] 69  Resp:  [18] 18  BP: (144-159)/(65-89) 159/89  SpO2:  [91 %-96 %] 96 %      Physical Exam  Constitutional:       General: She is in acute distress.   HENT:      Nose: Nose normal.      Mouth/Throat:      Mouth: Mucous membranes are moist.   Eyes:      Pupils: Pupils are equal, round, and reactive to light.   Cardiovascular:      Rate and Rhythm: Normal rate.   Pulmonary:      Comments: Coarse  Abdominal:      Palpations: Abdomen is soft.   Musculoskeletal:      Cervical back: Normal range of motion.      Right lower leg: No edema.      Left lower leg: No edema.   Neurological:      Mental Status: She is alert. Mental status is at baseline.   Psychiatric:         Mood and Affect: Mood normal.    Fluids    Intake/Output Summary (Last 24 hours) at 9/1/2021 1128  Last data filed at 9/1/2021 1000  Gross per 24 hour   Intake 240 ml   Output --   Net 240 ml       Laboratory  Recent Labs     08/30/21  0843 09/01/21  0025   WBC 7.0 11.5*   RBC 5.12 4.81   HEMOGLOBIN 15.4 14.5   HEMATOCRIT 47.1* 45.4   MCV 92.0 94.4   MCH 30.1 30.1   MCHC 32.7* 31.9*   RDW 48.0 49.1   PLATELETCT 264 316   MPV 9.6 9.6     Recent Labs     08/30/21  0843 09/01/21  0025   SODIUM 131* 137   POTASSIUM 4.4 4.2   CHLORIDE 100 102   CO2 21 25   GLUCOSE 129* 113*   BUN 15 19   CREATININE 0.52 0.69   CALCIUM 8.9 8.9     Recent Labs     08/30/21  0843   INR 1.01               Imaging  MR-BRAIN-WITH & W/O   Final Result      1.  No evidence of acute territorial infarct, intracranial hemorrhage or mass  lesion.   2.  Mild microangiopathic ischemic change versus demyelination or gliosis.   3.  Pansinusitis.   4.  Bilateral mastoid effusions.      IR-KYPHOPLASTY,1 VERTEBRA,LUMBAR    (Results Pending)        Assessment/Plan  * Metastatic disease (HCC)- (present on admission)  Assessment & Plan  Pulmonary mass with suspected spinal lesions.  IR consulted for biopsy, scheduled for 9/2/2021.  Continue pain medications and dexamethasone.    Pulmonary mass  Assessment & Plan  CT chest, abdomen, pelvis with contrast from 8/23/2021 showed bilateral pulmonary masses (largest measuring 2 x 2 cm) likely representing primary lung cancer with metastatic disease. She had enlarged right hilar and mediastinal lymph nodes consistent with metastatic adenopathy.  IR consulted for biopsy    Lumbar compression fracture (HCC)- (present on admission)  Assessment & Plan  MR lumbar spine from 8/23/2001 showed acute/subacute compression fracture of L2, L4, and L3, an approximately 10 mm peripherally enhancing lesion in the L4 vertebral body, focal T2 hyper intense lesions in the iliac bones concerning for osseous lesions with pathological fractures. IR were previously consulted for ostial cool RF ablation with biopsies, patient left AMA. Now planned for 9/2/2021.    H/O noncompliance with medical treatment, presenting hazards to health  Assessment & Plan  Recently left AGAINST MEDICAL ADVICE.  Continue to  on importance of adhering to medical recommendations and treatment.    Elevated alkaline phosphatase level- (present on admission)  Assessment & Plan  Likely due to metastatic disease.  Monitor    Tobacco dependence- (present on admission)  Assessment & Plan  Patient received counseling on tobacco cessation on admission.  Continue to  on quitting tobacco.        VTE prophylaxis: enoxaparin ppx    I have performed a physical exam and reviewed and updated ROS and Plan today (9/1/2021). In review of yesterday's note (8/31/2021),  there are no changes except as documented above.

## 2021-09-01 NOTE — CARE PLAN
The patient is Stable - Low risk of patient condition declining or worsening    Shift Goals  Clinical Goals: Cluster care, nausea control  Patient Goals: Pain management    Progress made toward(s) clinical / shift goals: Have been clustering care with pain med administration. Patient reports feeling a little nauseous with opioid administration, but denies the need for nausea meds. Has a few emesis bags available at bedside. See note below regarding pain    Problem: Pain - Standard  Goal: Alleviation of pain or a reduction in pain to the patient’s comfort goal  Outcome: Progressing  Note: Patient has been requiring doses of PRN dilaudid 1 mg every 2-4 hours to control her pain     Problem: Knowledge Deficit - Standard  Goal: Patient and family/care givers will demonstrate understanding of plan of care, disease process/condition, diagnostic tests and medications  Outcome: Progressing

## 2021-09-01 NOTE — PALLIATIVE CARE
Palliative Care follow-up  PC RN returned pt's POLST and declaration to her room and placed with her belongings per pt request. All questions answered. To locate the Declaration/POLST, please hover the cursor over the patient's code status to find all linked ACP documents.      Thank you for allowing Palliative Care to support this patient and family. Contact [phone] for additional assistance, change in patient status, or with any questions/concerns.

## 2021-09-02 ENCOUNTER — HOSPITAL ENCOUNTER (OUTPATIENT)
Dept: RADIOLOGY | Facility: MEDICAL CENTER | Age: 66
End: 2021-09-02
Attending: INTERNAL MEDICINE
Payer: MEDICARE

## 2021-09-02 ENCOUNTER — ANESTHESIA (OUTPATIENT)
Dept: RADIOLOGY | Facility: MEDICAL CENTER | Age: 66
DRG: 477 | End: 2021-09-02
Payer: MEDICARE

## 2021-09-02 ENCOUNTER — ANESTHESIA EVENT (OUTPATIENT)
Dept: RADIOLOGY | Facility: MEDICAL CENTER | Age: 66
DRG: 477 | End: 2021-09-02
Payer: MEDICARE

## 2021-09-02 LAB
ANION GAP SERPL CALC-SCNC: 13 MMOL/L (ref 7–16)
BASOPHILS # BLD AUTO: 0 % (ref 0–1.8)
BASOPHILS # BLD: 0 K/UL (ref 0–0.12)
BUN SERPL-MCNC: 17 MG/DL (ref 8–22)
CALCIUM SERPL-MCNC: 8.9 MG/DL (ref 8.5–10.5)
CHLORIDE SERPL-SCNC: 102 MMOL/L (ref 96–112)
CO2 SERPL-SCNC: 22 MMOL/L (ref 20–33)
CREAT SERPL-MCNC: 0.57 MG/DL (ref 0.5–1.4)
EOSINOPHIL # BLD AUTO: 0 K/UL (ref 0–0.51)
EOSINOPHIL NFR BLD: 0 % (ref 0–6.9)
ERYTHROCYTE [DISTWIDTH] IN BLOOD BY AUTOMATED COUNT: 47.8 FL (ref 35.9–50)
GLUCOSE SERPL-MCNC: 134 MG/DL (ref 65–99)
HCT VFR BLD AUTO: 43.5 % (ref 37–47)
HGB BLD-MCNC: 14.5 G/DL (ref 12–16)
LYMPHOCYTES # BLD AUTO: 0.73 K/UL (ref 1–4.8)
LYMPHOCYTES NFR BLD: 7.9 % (ref 22–41)
MANUAL DIFF BLD: NORMAL
MCH RBC QN AUTO: 30.5 PG (ref 27–33)
MCHC RBC AUTO-ENTMCNC: 33.3 G/DL (ref 33.6–35)
MCV RBC AUTO: 91.6 FL (ref 81.4–97.8)
MONOCYTES # BLD AUTO: 0.73 K/UL (ref 0–0.85)
MONOCYTES NFR BLD AUTO: 7.9 % (ref 0–13.4)
MORPHOLOGY BLD-IMP: NORMAL
MYELOCYTES NFR BLD MANUAL: 0.9 %
NEUTROPHILS # BLD AUTO: 7.66 K/UL (ref 2–7.15)
NEUTROPHILS NFR BLD: 83.3 % (ref 44–72)
NRBC # BLD AUTO: 0 K/UL
NRBC BLD-RTO: 0 /100 WBC
PATHOLOGY CONSULT NOTE: NORMAL
PLATELET # BLD AUTO: 312 K/UL (ref 164–446)
PLATELET BLD QL SMEAR: NORMAL
PMV BLD AUTO: 9.1 FL (ref 9–12.9)
POTASSIUM SERPL-SCNC: 4.3 MMOL/L (ref 3.6–5.5)
RBC # BLD AUTO: 4.75 M/UL (ref 4.2–5.4)
RBC BLD AUTO: NORMAL
SARS-COV+SARS-COV-2 AG RESP QL IA.RAPID: NOTDETECTED
SARS-COV-2 AB SERPL QL IA: REACTIVE
SARS-COV-2 RNA RESP QL NAA+PROBE: DETECTED
SODIUM SERPL-SCNC: 137 MMOL/L (ref 135–145)
SPECIMEN SOURCE: ABNORMAL
SPECIMEN SOURCE: NORMAL
WBC # BLD AUTO: 9.2 K/UL (ref 4.8–10.8)

## 2021-09-02 PROCEDURE — 0QU03JZ SUPPLEMENT LUMBAR VERTEBRA WITH SYNTHETIC SUBSTITUTE, PERCUTANEOUS APPROACH: ICD-10-PCS | Performed by: RADIOLOGY

## 2021-09-02 PROCEDURE — A9270 NON-COVERED ITEM OR SERVICE: HCPCS | Performed by: NURSE PRACTITIONER

## 2021-09-02 PROCEDURE — 770004 HCHG ROOM/CARE - ONCOLOGY PRIVATE *

## 2021-09-02 PROCEDURE — 700102 HCHG RX REV CODE 250 W/ 637 OVERRIDE(OP): Performed by: NURSE PRACTITIONER

## 2021-09-02 PROCEDURE — 99233 SBSQ HOSP IP/OBS HIGH 50: CPT | Performed by: INTERNAL MEDICINE

## 2021-09-02 PROCEDURE — 700105 HCHG RX REV CODE 258: Performed by: INTERNAL MEDICINE

## 2021-09-02 PROCEDURE — 85007 BL SMEAR W/DIFF WBC COUNT: CPT

## 2021-09-02 PROCEDURE — 700101 HCHG RX REV CODE 250: Performed by: ANESTHESIOLOGY

## 2021-09-02 PROCEDURE — 700102 HCHG RX REV CODE 250 W/ 637 OVERRIDE(OP): Performed by: ANESTHESIOLOGY

## 2021-09-02 PROCEDURE — 87426 SARSCOV CORONAVIRUS AG IA: CPT

## 2021-09-02 PROCEDURE — 700111 HCHG RX REV CODE 636 W/ 250 OVERRIDE (IP): Performed by: ANESTHESIOLOGY

## 2021-09-02 PROCEDURE — 88307 TISSUE EXAM BY PATHOLOGIST: CPT | Mod: 59

## 2021-09-02 PROCEDURE — 770001 HCHG ROOM/CARE - MED/SURG/GYN PRIV*

## 2021-09-02 PROCEDURE — 700111 HCHG RX REV CODE 636 W/ 250 OVERRIDE (IP): Performed by: STUDENT IN AN ORGANIZED HEALTH CARE EDUCATION/TRAINING PROGRAM

## 2021-09-02 PROCEDURE — U0003 INFECTIOUS AGENT DETECTION BY NUCLEIC ACID (DNA OR RNA); SEVERE ACUTE RESPIRATORY SYNDROME CORONAVIRUS 2 (SARS-COV-2) (CORONAVIRUS DISEASE [COVID-19]), AMPLIFIED PROBE TECHNIQUE, MAKING USE OF HIGH THROUGHPUT TECHNOLOGIES AS DESCRIBED BY CMS-2020-01-R: HCPCS

## 2021-09-02 PROCEDURE — 0QS03ZZ REPOSITION LUMBAR VERTEBRA, PERCUTANEOUS APPROACH: ICD-10-PCS | Performed by: RADIOLOGY

## 2021-09-02 PROCEDURE — 36415 COLL VENOUS BLD VENIPUNCTURE: CPT

## 2021-09-02 PROCEDURE — 80048 BASIC METABOLIC PNL TOTAL CA: CPT

## 2021-09-02 PROCEDURE — 160002 HCHG RECOVERY MINUTES (STAT)

## 2021-09-02 PROCEDURE — 88311 DECALCIFY TISSUE: CPT | Mod: 91

## 2021-09-02 PROCEDURE — 700102 HCHG RX REV CODE 250 W/ 637 OVERRIDE(OP): Performed by: INTERNAL MEDICINE

## 2021-09-02 PROCEDURE — A9270 NON-COVERED ITEM OR SERVICE: HCPCS | Performed by: ANESTHESIOLOGY

## 2021-09-02 PROCEDURE — 0Q503ZZ DESTRUCTION OF LUMBAR VERTEBRA, PERCUTANEOUS APPROACH: ICD-10-PCS | Performed by: RADIOLOGY

## 2021-09-02 PROCEDURE — 85027 COMPLETE CBC AUTOMATED: CPT

## 2021-09-02 PROCEDURE — A9270 NON-COVERED ITEM OR SERVICE: HCPCS | Performed by: INTERNAL MEDICINE

## 2021-09-02 PROCEDURE — 88341 IMHCHEM/IMCYTCHM EA ADD ANTB: CPT | Mod: 91

## 2021-09-02 PROCEDURE — 86769 SARS-COV-2 COVID-19 ANTIBODY: CPT

## 2021-09-02 PROCEDURE — U0005 INFEC AGEN DETEC AMPLI PROBE: HCPCS

## 2021-09-02 PROCEDURE — 0QB03ZX EXCISION OF LUMBAR VERTEBRA, PERCUTANEOUS APPROACH, DIAGNOSTIC: ICD-10-PCS | Performed by: RADIOLOGY

## 2021-09-02 PROCEDURE — 700111 HCHG RX REV CODE 636 W/ 250 OVERRIDE (IP): Performed by: INTERNAL MEDICINE

## 2021-09-02 PROCEDURE — 22514 PERQ VERTEBRAL AUGMENTATION: CPT

## 2021-09-02 PROCEDURE — 88342 IMHCHEM/IMCYTCHM 1ST ANTB: CPT

## 2021-09-02 RX ORDER — LIDOCAINE HYDROCHLORIDE 10 MG/ML
INJECTION, SOLUTION EPIDURAL; INFILTRATION; INTRACAUDAL; PERINEURAL
Status: DISPENSED
Start: 2021-09-02 | End: 2021-09-03

## 2021-09-02 RX ORDER — HYDROMORPHONE HYDROCHLORIDE 1 MG/ML
0.4 INJECTION, SOLUTION INTRAMUSCULAR; INTRAVENOUS; SUBCUTANEOUS
Status: DISCONTINUED | OUTPATIENT
Start: 2021-09-02 | End: 2021-09-02 | Stop reason: HOSPADM

## 2021-09-02 RX ORDER — ONDANSETRON 2 MG/ML
4 INJECTION INTRAMUSCULAR; INTRAVENOUS
Status: CANCELLED | OUTPATIENT
Start: 2021-09-02

## 2021-09-02 RX ORDER — HYDROMORPHONE HYDROCHLORIDE 1 MG/ML
0.1 INJECTION, SOLUTION INTRAMUSCULAR; INTRAVENOUS; SUBCUTANEOUS
Status: CANCELLED | OUTPATIENT
Start: 2021-09-02

## 2021-09-02 RX ORDER — HYDRALAZINE HYDROCHLORIDE 20 MG/ML
5 INJECTION INTRAMUSCULAR; INTRAVENOUS
Status: DISCONTINUED | OUTPATIENT
Start: 2021-09-02 | End: 2021-09-02 | Stop reason: HOSPADM

## 2021-09-02 RX ORDER — HYDRALAZINE HYDROCHLORIDE 20 MG/ML
5 INJECTION INTRAMUSCULAR; INTRAVENOUS
Status: CANCELLED | OUTPATIENT
Start: 2021-09-02

## 2021-09-02 RX ORDER — SODIUM CHLORIDE, SODIUM LACTATE, POTASSIUM CHLORIDE, CALCIUM CHLORIDE 600; 310; 30; 20 MG/100ML; MG/100ML; MG/100ML; MG/100ML
INJECTION, SOLUTION INTRAVENOUS CONTINUOUS
Status: DISCONTINUED | OUTPATIENT
Start: 2021-09-02 | End: 2021-09-02 | Stop reason: HOSPADM

## 2021-09-02 RX ORDER — ONDANSETRON 2 MG/ML
INJECTION INTRAMUSCULAR; INTRAVENOUS PRN
Status: DISCONTINUED | OUTPATIENT
Start: 2021-09-02 | End: 2021-09-02 | Stop reason: SURG

## 2021-09-02 RX ORDER — ONDANSETRON 2 MG/ML
4 INJECTION INTRAMUSCULAR; INTRAVENOUS
Status: DISCONTINUED | OUTPATIENT
Start: 2021-09-02 | End: 2021-09-02 | Stop reason: HOSPADM

## 2021-09-02 RX ORDER — HYDROMORPHONE HYDROCHLORIDE 1 MG/ML
0.2 INJECTION, SOLUTION INTRAMUSCULAR; INTRAVENOUS; SUBCUTANEOUS
Status: CANCELLED | OUTPATIENT
Start: 2021-09-02

## 2021-09-02 RX ORDER — OXYCODONE HCL 5 MG/5 ML
10 SOLUTION, ORAL ORAL
Status: COMPLETED | OUTPATIENT
Start: 2021-09-02 | End: 2021-09-02

## 2021-09-02 RX ORDER — HYDROMORPHONE HYDROCHLORIDE 1 MG/ML
0.4 INJECTION, SOLUTION INTRAMUSCULAR; INTRAVENOUS; SUBCUTANEOUS
Status: CANCELLED | OUTPATIENT
Start: 2021-09-02

## 2021-09-02 RX ORDER — LISINOPRIL 5 MG/1
5 TABLET ORAL
Status: DISCONTINUED | OUTPATIENT
Start: 2021-09-02 | End: 2021-09-05

## 2021-09-02 RX ORDER — MIDAZOLAM HYDROCHLORIDE 1 MG/ML
INJECTION INTRAMUSCULAR; INTRAVENOUS
Status: COMPLETED
Start: 2021-09-02 | End: 2021-09-02

## 2021-09-02 RX ORDER — SODIUM CHLORIDE, SODIUM LACTATE, POTASSIUM CHLORIDE, CALCIUM CHLORIDE 600; 310; 30; 20 MG/100ML; MG/100ML; MG/100ML; MG/100ML
INJECTION, SOLUTION INTRAVENOUS CONTINUOUS
Status: CANCELLED | OUTPATIENT
Start: 2021-09-02

## 2021-09-02 RX ORDER — DIPHENHYDRAMINE HYDROCHLORIDE 50 MG/ML
12.5 INJECTION INTRAMUSCULAR; INTRAVENOUS
Status: DISCONTINUED | OUTPATIENT
Start: 2021-09-02 | End: 2021-09-02 | Stop reason: HOSPADM

## 2021-09-02 RX ORDER — HYDROMORPHONE HYDROCHLORIDE 1 MG/ML
0.2 INJECTION, SOLUTION INTRAMUSCULAR; INTRAVENOUS; SUBCUTANEOUS
Status: DISCONTINUED | OUTPATIENT
Start: 2021-09-02 | End: 2021-09-02 | Stop reason: HOSPADM

## 2021-09-02 RX ORDER — HALOPERIDOL 5 MG/ML
1 INJECTION INTRAMUSCULAR
Status: DISCONTINUED | OUTPATIENT
Start: 2021-09-02 | End: 2021-09-02 | Stop reason: HOSPADM

## 2021-09-02 RX ORDER — MEPERIDINE HYDROCHLORIDE 25 MG/ML
6.25 INJECTION INTRAMUSCULAR; INTRAVENOUS; SUBCUTANEOUS
Status: CANCELLED | OUTPATIENT
Start: 2021-09-02

## 2021-09-02 RX ORDER — HALOPERIDOL 5 MG/ML
1 INJECTION INTRAMUSCULAR
Status: CANCELLED | OUTPATIENT
Start: 2021-09-02

## 2021-09-02 RX ORDER — NEOSTIGMINE METHYLSULFATE 1 MG/ML
INJECTION, SOLUTION INTRAVENOUS PRN
Status: DISCONTINUED | OUTPATIENT
Start: 2021-09-02 | End: 2021-09-02 | Stop reason: SURG

## 2021-09-02 RX ORDER — CEFAZOLIN SODIUM 1 G/3ML
INJECTION, POWDER, FOR SOLUTION INTRAMUSCULAR; INTRAVENOUS PRN
Status: DISCONTINUED | OUTPATIENT
Start: 2021-09-02 | End: 2021-09-02 | Stop reason: SURG

## 2021-09-02 RX ORDER — METOPROLOL TARTRATE 1 MG/ML
1 INJECTION, SOLUTION INTRAVENOUS
Status: CANCELLED | OUTPATIENT
Start: 2021-09-02

## 2021-09-02 RX ORDER — OXYCODONE HCL 5 MG/5 ML
10 SOLUTION, ORAL ORAL
Status: CANCELLED | OUTPATIENT
Start: 2021-09-02

## 2021-09-02 RX ORDER — LIDOCAINE HYDROCHLORIDE 20 MG/ML
INJECTION, SOLUTION EPIDURAL; INFILTRATION; INTRACAUDAL; PERINEURAL PRN
Status: DISCONTINUED | OUTPATIENT
Start: 2021-09-02 | End: 2021-09-02 | Stop reason: SURG

## 2021-09-02 RX ORDER — MEPERIDINE HYDROCHLORIDE 25 MG/ML
6.25 INJECTION INTRAMUSCULAR; INTRAVENOUS; SUBCUTANEOUS
Status: DISCONTINUED | OUTPATIENT
Start: 2021-09-02 | End: 2021-09-02 | Stop reason: HOSPADM

## 2021-09-02 RX ORDER — DEXAMETHASONE SODIUM PHOSPHATE 4 MG/ML
INJECTION, SOLUTION INTRA-ARTICULAR; INTRALESIONAL; INTRAMUSCULAR; INTRAVENOUS; SOFT TISSUE PRN
Status: DISCONTINUED | OUTPATIENT
Start: 2021-09-02 | End: 2021-09-02 | Stop reason: SURG

## 2021-09-02 RX ORDER — HYDROMORPHONE HYDROCHLORIDE 1 MG/ML
0.1 INJECTION, SOLUTION INTRAMUSCULAR; INTRAVENOUS; SUBCUTANEOUS
Status: DISCONTINUED | OUTPATIENT
Start: 2021-09-02 | End: 2021-09-02 | Stop reason: HOSPADM

## 2021-09-02 RX ORDER — OXYCODONE HCL 5 MG/5 ML
5 SOLUTION, ORAL ORAL
Status: COMPLETED | OUTPATIENT
Start: 2021-09-02 | End: 2021-09-02

## 2021-09-02 RX ORDER — METOPROLOL TARTRATE 1 MG/ML
1 INJECTION, SOLUTION INTRAVENOUS
Status: DISCONTINUED | OUTPATIENT
Start: 2021-09-02 | End: 2021-09-02 | Stop reason: HOSPADM

## 2021-09-02 RX ORDER — DIPHENHYDRAMINE HYDROCHLORIDE 50 MG/ML
12.5 INJECTION INTRAMUSCULAR; INTRAVENOUS
Status: CANCELLED | OUTPATIENT
Start: 2021-09-02

## 2021-09-02 RX ORDER — OXYCODONE HCL 5 MG/5 ML
5 SOLUTION, ORAL ORAL
Status: CANCELLED | OUTPATIENT
Start: 2021-09-02

## 2021-09-02 RX ADMIN — FENTANYL CITRATE 25 MCG: 50 INJECTION, SOLUTION INTRAMUSCULAR; INTRAVENOUS at 13:39

## 2021-09-02 RX ADMIN — ONDANSETRON 4 MG: 2 INJECTION INTRAMUSCULAR; INTRAVENOUS at 14:11

## 2021-09-02 RX ADMIN — HYDROMORPHONE HYDROCHLORIDE 1 MG: 1 INJECTION, SOLUTION INTRAMUSCULAR; INTRAVENOUS; SUBCUTANEOUS at 09:51

## 2021-09-02 RX ADMIN — DOCUSATE SODIUM 50 MG AND SENNOSIDES 8.6 MG 2 TABLET: 8.6; 5 TABLET, FILM COATED ORAL at 05:49

## 2021-09-02 RX ADMIN — LIDOCAINE HYDROCHLORIDE 50 MG: 20 INJECTION, SOLUTION EPIDURAL; INFILTRATION; INTRACAUDAL at 13:13

## 2021-09-02 RX ADMIN — FENTANYL CITRATE 25 MCG: 50 INJECTION, SOLUTION INTRAMUSCULAR; INTRAVENOUS at 15:45

## 2021-09-02 RX ADMIN — FENTANYL CITRATE 25 MCG: 50 INJECTION, SOLUTION INTRAMUSCULAR; INTRAVENOUS at 15:49

## 2021-09-02 RX ADMIN — ENALAPRILAT 1.25 MG: 1.25 INJECTION INTRAVENOUS at 03:25

## 2021-09-02 RX ADMIN — SODIUM CHLORIDE, POTASSIUM CHLORIDE, SODIUM LACTATE AND CALCIUM CHLORIDE: 600; 310; 30; 20 INJECTION, SOLUTION INTRAVENOUS at 13:04

## 2021-09-02 RX ADMIN — DOCUSATE SODIUM 50 MG AND SENNOSIDES 8.6 MG 2 TABLET: 8.6; 5 TABLET, FILM COATED ORAL at 18:37

## 2021-09-02 RX ADMIN — HYDROMORPHONE HYDROCHLORIDE 1 MG: 1 INJECTION, SOLUTION INTRAMUSCULAR; INTRAVENOUS; SUBCUTANEOUS at 05:49

## 2021-09-02 RX ADMIN — MIDAZOLAM 2 MG: 1 INJECTION INTRAMUSCULAR; INTRAVENOUS at 13:08

## 2021-09-02 RX ADMIN — NEOSTIGMINE METHYLSULFATE 2 MG: 1 INJECTION INTRAVENOUS at 15:02

## 2021-09-02 RX ADMIN — DEXAMETHASONE SODIUM PHOSPHATE 4 MG: 4 INJECTION, SOLUTION INTRA-ARTICULAR; INTRALESIONAL; INTRAMUSCULAR; INTRAVENOUS; SOFT TISSUE at 13:51

## 2021-09-02 RX ADMIN — FENTANYL CITRATE 25 MCG: 50 INJECTION, SOLUTION INTRAMUSCULAR; INTRAVENOUS at 15:51

## 2021-09-02 RX ADMIN — SODIUM CHLORIDE, POTASSIUM CHLORIDE, SODIUM LACTATE AND CALCIUM CHLORIDE: 600; 310; 30; 20 INJECTION, SOLUTION INTRAVENOUS at 03:26

## 2021-09-02 RX ADMIN — FENTANYL CITRATE 25 MCG: 50 INJECTION, SOLUTION INTRAMUSCULAR; INTRAVENOUS at 14:30

## 2021-09-02 RX ADMIN — DEXAMETHASONE 4 MG: 4 TABLET ORAL at 05:49

## 2021-09-02 RX ADMIN — PROPOFOL 100 MG: 10 INJECTION, EMULSION INTRAVENOUS at 13:13

## 2021-09-02 RX ADMIN — NICOTINE TRANSDERMAL SYSTEM 21 MG: 21 PATCH, EXTENDED RELEASE TRANSDERMAL at 05:49

## 2021-09-02 RX ADMIN — OXYCODONE HYDROCHLORIDE 10 MG: 5 SOLUTION ORAL at 15:49

## 2021-09-02 RX ADMIN — HYDROMORPHONE HYDROCHLORIDE 1 MG: 1 INJECTION, SOLUTION INTRAMUSCULAR; INTRAVENOUS; SUBCUTANEOUS at 00:58

## 2021-09-02 RX ADMIN — HYDROMORPHONE HYDROCHLORIDE 1 MG: 1 INJECTION, SOLUTION INTRAMUSCULAR; INTRAVENOUS; SUBCUTANEOUS at 03:25

## 2021-09-02 RX ADMIN — FENTANYL CITRATE 25 MCG: 50 INJECTION, SOLUTION INTRAMUSCULAR; INTRAVENOUS at 13:08

## 2021-09-02 RX ADMIN — ROCURONIUM BROMIDE 28 MG: 10 INJECTION, SOLUTION INTRAVENOUS at 13:13

## 2021-09-02 RX ADMIN — CEFAZOLIN 2 G: 330 INJECTION, POWDER, FOR SOLUTION INTRAMUSCULAR; INTRAVENOUS at 13:29

## 2021-09-02 RX ADMIN — LIDOCAINE HYDROCHLORIDE 50 MG: 20 INJECTION, SOLUTION EPIDURAL; INFILTRATION; INTRACAUDAL at 15:04

## 2021-09-02 RX ADMIN — FENTANYL CITRATE 25 MCG: 50 INJECTION, SOLUTION INTRAMUSCULAR; INTRAVENOUS at 15:47

## 2021-09-02 RX ADMIN — FENTANYL CITRATE 25 MCG: 50 INJECTION, SOLUTION INTRAMUSCULAR; INTRAVENOUS at 15:03

## 2021-09-02 RX ADMIN — EPHEDRINE SULFATE 7.5 MG: 50 INJECTION INTRAMUSCULAR; INTRAVENOUS; SUBCUTANEOUS at 13:27

## 2021-09-02 RX ADMIN — DEXAMETHASONE 4 MG: 4 TABLET ORAL at 18:37

## 2021-09-02 RX ADMIN — GLYCOPYRROLATE 0.4 MG: 0.2 INJECTION INTRAMUSCULAR; INTRAVENOUS at 15:02

## 2021-09-02 RX ADMIN — HYDRALAZINE HYDROCHLORIDE 5 MG: 20 INJECTION INTRAMUSCULAR; INTRAVENOUS at 16:18

## 2021-09-02 RX ADMIN — HYDROMORPHONE HYDROCHLORIDE 0.4 MG: 1 INJECTION, SOLUTION INTRAMUSCULAR; INTRAVENOUS; SUBCUTANEOUS at 16:10

## 2021-09-02 ASSESSMENT — PAIN DESCRIPTION - PAIN TYPE
TYPE: ACUTE PAIN
TYPE: SURGICAL PAIN
TYPE: SURGICAL PAIN
TYPE: ACUTE PAIN
TYPE: ACUTE PAIN
TYPE: SURGICAL PAIN
TYPE: ACUTE PAIN
TYPE: SURGICAL PAIN

## 2021-09-02 NOTE — PROGRESS NOTES
"9/2/21: CHW Cristian met with pt at bedside to complete CCM intake assessment. All pt disclosed to this CHW was that she was homeless and \"needed everything\". She sounded very irritated with the questions and said \"I keep repeating myself to all of you. I don't even know when I'm leaving can you come back after I get my procedure I'm in a lot of pain right now\". CHW will watch chart and complete intake assessment at a later time to provide resources.     9/13/21: Pt d/c over the weekend to [facility] wit hospice referral. CCM will not follow at this time due to no contact number for pt.  "

## 2021-09-02 NOTE — PROGRESS NOTES
Hospital Medicine Daily Progress Note    Date of Service  9/2/2021    Chief Complaint  Kayleen Alfredo is a 66 y.o. female admitted 8/28/2021 with intractable back pain.  She was also admitted on 8/22/2021 to 8/23/2021 with the same complaint, and left AMA. CT abdomen and pelvis from 9/21/2021 was unremarkable.     CT L-spine from 8/22/2021 showed a subacute compression fracture at L2, L3, L4. She was also noted to have areas of patchy sclerosis involving all lumbar levels as well as a lucent area involving the right ilium, suspicious for metastatic disease.      CT chest, abdomen, pelvis with contrast from 8/23/2021 showed bilateral pulmonary masses (largest measuring 2 x 2 cm) likely representing primary lung cancer with metastatic disease.  She had enlarged right hilar and mediastinal lymph nodes consistent with metastatic adenopathy.     MR lumbar spine from 8/23/2001 showed acute/subacute compression fracture of L2, L4, and L3, an approximately 10 mm peripherally enhancing lesion in the L4 vertebral body, focal T2 hyper intense lesions in the iliac bones concerning for osseous lesions with pathological fractures. IR were consulted for ostial cool RF ablation with biopsies, patient left AMA.      Hospital Course  MRI brain from 8/29/2021 showed no evidence of acute infarct, intracranial hemorrhage or mass lesion.  Dexamethasone was started for pain.     Interval Problem Update  Planned for IR Osteocool RF ablation + BX on L2 and L4 with GA today     I have personally seen and examined the patient at bedside. I discussed the plan of care with patient and bedside RN.     Consultants/Specialty  IR     Code Status  Full Code     Disposition  Patient is not medically cleared.   Anticipate discharge to to home with close outpatient follow-up.  I have placed the appropriate orders for post-discharge needs.     Review of Systems  Review of Systems   Constitutional: Positive for malaise/fatigue.   HENT: Negative.    Eyes:  Negative.    Respiratory: Negative.    Cardiovascular: Negative.    Gastrointestinal: Negative.    Genitourinary: Negative.    Musculoskeletal: Positive for back pain and myalgias.   Skin: Negative.    Neurological: Negative.    Endo/Heme/Allergies: Negative.    Psychiatric/Behavioral: Negative.         Physical Exam  Temp:  [35.9 °C (96.6 °F)-36.7 °C (98.1 °F)] 36 °C (96.8 °F)  Pulse:  [63-72] 63  Resp:  [16-18] 18  BP: (153-215)/() 164/82  SpO2:  [93 %-96 %] 96 %      Physical Exam  Constitutional:       General: She is in acute distress.   HENT:      Nose: Nose normal.      Mouth/Throat:      Mouth: Mucous membranes are moist.   Eyes:      Pupils: Pupils are equal, round, and reactive to light.   Cardiovascular:      Rate and Rhythm: Normal rate.   Pulmonary:      Comments: Coarse  Abdominal:      Palpations: Abdomen is soft.   Musculoskeletal:      Cervical back: Normal range of motion.      Right lower leg: No edema.      Left lower leg: No edema.   Neurological:      Mental Status: She is alert. Mental status is at baseline.   Psychiatric:         Mood and Affect: Mood normal.     Fluids    Intake/Output Summary (Last 24 hours) at 9/2/2021 1215  Last data filed at 9/1/2021 1418  Gross per 24 hour   Intake 120 ml   Output --   Net 120 ml       Laboratory  Recent Labs     09/01/21  0025 09/02/21  0007   WBC 11.5* 9.2   RBC 4.81 4.75   HEMOGLOBIN 14.5 14.5   HEMATOCRIT 45.4 43.5   MCV 94.4 91.6   MCH 30.1 30.5   MCHC 31.9* 33.3*   RDW 49.1 47.8   PLATELETCT 316 312   MPV 9.6 9.1     Recent Labs     09/01/21  0025 09/02/21  0007   SODIUM 137 137   POTASSIUM 4.2 4.3   CHLORIDE 102 102   CO2 25 22   GLUCOSE 113* 134*   BUN 19 17   CREATININE 0.69 0.57   CALCIUM 8.9 8.9                   Imaging  MR-BRAIN-WITH & W/O   Final Result      1.  No evidence of acute territorial infarct, intracranial hemorrhage or mass lesion.   2.  Mild microangiopathic ischemic change versus demyelination or gliosis.   3.   Pansinusitis.   4.  Bilateral mastoid effusions.      IR-KYPHOPLASTY,1 VERTEBRA,LUMBAR    (Results Pending)        Assessment/Plan  * Metastatic disease (HCC)- (present on admission)  Assessment & Plan  Pulmonary mass with suspected spinal lesions. IR Osteocool RF ablation + BX on L2 and L4 with GA scheduled for today. Continue pain medications and dexamethasone.    Pulmonary mass  Assessment & Plan  CT chest, abdomen, pelvis with contrast from 8/23/2021 showed bilateral pulmonary masses (largest measuring 2 x 2 cm) likely representing primary lung cancer with metastatic disease. She had enlarged right hilar and mediastinal lymph nodes consistent with metastatic adenopathy.    Lumbar compression fracture (HCC)- (present on admission)  Assessment & Plan  MR lumbar spine from 8/23/2001 showed acute/subacute compression fracture of L2, L4, and L3, an approximately 10 mm peripherally enhancing lesion in the L4 vertebral body, focal T2 hyper intense lesions in the iliac bones concerning for osseous lesions with pathological fractures. IR were previously consulted for ostial cool RF ablation with biopsies, patient left AMA. Now planned for today    H/O noncompliance with medical treatment, presenting hazards to health  Assessment & Plan  Recently left AGAINST MEDICAL ADVICE.  Continue to  on importance of adhering to medical recommendations and treatment.    Elevated alkaline phosphatase level- (present on admission)  Assessment & Plan  Likely due to metastatic disease.  Monitor    Tobacco dependence- (present on admission)  Assessment & Plan  Patient received counseling on tobacco cessation on admission.  Continue to  on quitting tobacco.        VTE prophylaxis: enoxaparin ppx    I have performed a physical exam and reviewed and updated ROS and Plan today (9/2/2021). In review of yesterday's note (9/1/2021), there are no changes except as documented above.

## 2021-09-02 NOTE — ANESTHESIA POSTPROCEDURE EVALUATION
Patient: Kayleen Alfredo    Procedure Summary     Date: 09/02/21 Room / Location: [facility] - INTERVENTIONAL - [facility]    Anesthesia Start: 1304 Anesthesia Stop: 1526    Procedure: IR-KYPHOPLASTY,1 VERTEBRA,LUMBAR Diagnosis:       (Vertebrae Fracture)      (osteocool RF ablation and biopsy on L2 and L4)    Scheduled Providers: Lee Mancera M.D.; Garrison Forrest M.D. Responsible Provider: Garrison Forrest M.D.    Anesthesia Type: general ASA Status: 3          Final Anesthesia Type: general  Last vitals  BP        Temp        Pulse       Resp        SpO2          Anesthesia Post Evaluation    Patient location during evaluation: PACU  Patient participation: complete - patient participated  Level of consciousness: awake and alert    Airway patency: patent  Anesthetic complications: no  Cardiovascular status: hemodynamically stable  Respiratory status: acceptable  Hydration status: euvolemic    PONV: none          No complications documented.     Nurse Pain Score: 0 (NPRS)

## 2021-09-02 NOTE — PROGRESS NOTES
IR NOTE:  KYPHON XPEDE BONE CEMENT REF CX01A LOT [id number]  FOR L2/L4 KYPHOPLASTY AND OSTEOCOOL COMPLETED BY DR. ROLDAN WITH BIOPSY OF EACH LEVEL, DR. CARDENAS CONSENTED PT FOR  ANESTHESIA WITH RISK OF POSSIBLE TEETH DISLODGEMENT.  PT ARGUE WITH HIM OF HAVING LOOSE TEETH OR NOT, GLIDESCOPE USED.

## 2021-09-02 NOTE — ANESTHESIA PREPROCEDURE EVALUATION
Copd  smoker    Relevant Problems   PULMONARY   (positive) History of syphilis      NEURO   (positive) H/O noncompliance with medical treatment, presenting hazards to health   (positive) History of syphilis       Physical Exam    Airway   Mallampati: II  TM distance: >3 FB  Neck ROM: full       Cardiovascular - normal exam  Rhythm: regular  Rate: normal  (-) murmur     Dental - normal exam      Very poor dentition   Pulmonary - normal exam  Breath sounds clear to auscultation     Abdominal    Neurological - normal exam         Other findings: Teeth in very bad condition            Anesthesia Plan    ASA 3   ASA physical status 3 criteria: COPD    Plan - general       Airway plan will be ETT          Induction: intravenous    Postoperative Plan: Postoperative administration of opioids is intended.    Pertinent diagnostic labs and testing reviewed    Informed Consent:    Anesthetic plan and risks discussed with patient.    Use of blood products discussed with: patient whom consented to blood products.

## 2021-09-02 NOTE — CARE PLAN
The patient is Stable - Low risk of patient condition declining or worsening    Shift Goals  Clinical Goals: NPO midnight  Patient Goals: Pain control    Progress made toward(s) clinical / shift goals: Patient has been made NPO in preparation for her biopsy today, see note below regarding pain control    Problem: Pain - Standard  Goal: Alleviation of pain or a reduction in pain to the patient’s comfort goal  Outcome: Progressing  Note: Patient has been asking for PRN Dilaudid every 2-3 hours, has been able to sleep on and off tonight     Problem: Knowledge Deficit - Standard  Goal: Patient and family/care givers will demonstrate understanding of plan of care, disease process/condition, diagnostic tests and medications  Outcome: Progressing

## 2021-09-02 NOTE — OR NURSING
1535 Patient arrived from IR post L2/L4 kyphoplasty and bone biopsy. Surgical site covered with gauze and tegaderm clean dry intact and soft. Report received from Rn and anesthesiologist. bp high. Pain complaining of 10/10 surgical pain.   1550 Patient complaining of surgical pain 10/10 medicated as ordered.   1610 Patient complaining of pain 9/10 medicated as ordered.   1624 Report given to Camelia Sanders all questions answered.   1630 Criteria met to discharge patient out of recovery   1637 Patient back to room with all her personal belongings

## 2021-09-02 NOTE — ANESTHESIA TIME REPORT
Anesthesia Start and Stop Event Times     Date Time Event    9/2/2021 1258 Ready for Procedure     1304 Anesthesia Start     1526 Anesthesia Stop        Responsible Staff  09/02/21    Name Role Begin End    Garrison Forrest M.D. Anesth 1304 1526        Preop Diagnosis (Free Text):  Pre-op Diagnosis             Preop Diagnosis (Codes):    Post op Diagnosis  Compression fracture of lumbar spine, non-traumatic (HCC)      Premium Reason  A. 3PM - 7AM    Comments:

## 2021-09-02 NOTE — ANESTHESIA PROCEDURE NOTES
Airway    Date/Time: 9/2/2021 1:15 PM  Performed by: Garrison Forrest M.D.  Authorized by: Garrison Forrest M.D.     Location:  OR  Urgency:  Elective  Indications for Airway Management:  Anesthesia      Spontaneous Ventilation: absent    Sedation Level:  Deep  Preoxygenated: Yes    Patient Position:  Sniffing  Final Airway Type:  Endotracheal airway  Final Endotracheal Airway:  ETT  Cuffed: Yes    Technique Used for Successful ETT Placement:  Direct laryngoscopy    Insertion Site:  Oral  Blade Type:  Glide  Laryngoscope Blade/Videolaryngoscope Blade Size:  3  ETT Size (mm):  7.0  Measured from:  Teeth  ETT to Teeth (cm):  20  Placement Verified by: auscultation and capnometry    Cormack-Lehane Classification:  Grade IIa - partial view of glottis  Number of Attempts at Approach:  1   [place] used because patient's teeth are in very bad condition, caries and rotten.  Teeth as they were prior to intubation

## 2021-09-02 NOTE — OR SURGEON
Immediate Post- Operative Note        PostOp Diagnosis: Lung cancer with pathological L2 and L4 compression fractures      Procedure(s): L2 and L4 Biopsy, Osteocool ablation and Kyphoplasty    Estimated Blood Loss: Less than 50 ml    Complications: None      9/2/2021     3:15 PM     Brenda Reyna M.D.

## 2021-09-03 ENCOUNTER — PHARMACY VISIT (OUTPATIENT)
Dept: PHARMACY | Facility: MEDICAL CENTER | Age: 66
End: 2021-09-03
Payer: COMMERCIAL

## 2021-09-03 PROCEDURE — 700102 HCHG RX REV CODE 250 W/ 637 OVERRIDE(OP): Performed by: INTERNAL MEDICINE

## 2021-09-03 PROCEDURE — 770001 HCHG ROOM/CARE - MED/SURG/GYN PRIV*

## 2021-09-03 PROCEDURE — 99232 SBSQ HOSP IP/OBS MODERATE 35: CPT | Performed by: STUDENT IN AN ORGANIZED HEALTH CARE EDUCATION/TRAINING PROGRAM

## 2021-09-03 PROCEDURE — 700102 HCHG RX REV CODE 250 W/ 637 OVERRIDE(OP): Performed by: STUDENT IN AN ORGANIZED HEALTH CARE EDUCATION/TRAINING PROGRAM

## 2021-09-03 PROCEDURE — 700105 HCHG RX REV CODE 258: Performed by: INTERNAL MEDICINE

## 2021-09-03 PROCEDURE — 97165 OT EVAL LOW COMPLEX 30 MIN: CPT

## 2021-09-03 PROCEDURE — A9270 NON-COVERED ITEM OR SERVICE: HCPCS | Performed by: STUDENT IN AN ORGANIZED HEALTH CARE EDUCATION/TRAINING PROGRAM

## 2021-09-03 PROCEDURE — A9270 NON-COVERED ITEM OR SERVICE: HCPCS | Performed by: INTERNAL MEDICINE

## 2021-09-03 PROCEDURE — RXMED WILLOW AMBULATORY MEDICATION CHARGE: Performed by: STUDENT IN AN ORGANIZED HEALTH CARE EDUCATION/TRAINING PROGRAM

## 2021-09-03 PROCEDURE — A9270 NON-COVERED ITEM OR SERVICE: HCPCS | Performed by: NURSE PRACTITIONER

## 2021-09-03 PROCEDURE — 97535 SELF CARE MNGMENT TRAINING: CPT

## 2021-09-03 PROCEDURE — 700102 HCHG RX REV CODE 250 W/ 637 OVERRIDE(OP): Performed by: NURSE PRACTITIONER

## 2021-09-03 RX ORDER — DEXAMETHASONE 4 MG/1
4 TABLET ORAL EVERY 12 HOURS
Qty: 10 TABLET | Refills: 0 | Status: SHIPPED | OUTPATIENT
Start: 2021-09-03 | End: 2021-09-11

## 2021-09-03 RX ORDER — LISINOPRIL 5 MG/1
5 TABLET ORAL DAILY
Qty: 30 TABLET | Refills: 3 | Status: SHIPPED | OUTPATIENT
Start: 2021-09-04 | End: 2021-09-11

## 2021-09-03 RX ORDER — OXYCODONE HYDROCHLORIDE AND ACETAMINOPHEN 5; 325 MG/1; MG/1
1 TABLET ORAL EVERY 6 HOURS PRN
Status: DISCONTINUED | OUTPATIENT
Start: 2021-09-03 | End: 2021-09-06

## 2021-09-03 RX ORDER — OXYCODONE HYDROCHLORIDE AND ACETAMINOPHEN 5; 325 MG/1; MG/1
1 TABLET ORAL EVERY 6 HOURS PRN
Qty: 12 TABLET | Refills: 0 | Status: SHIPPED | OUTPATIENT
Start: 2021-09-03 | End: 2021-09-11 | Stop reason: SDUPTHER

## 2021-09-03 RX ADMIN — NICOTINE TRANSDERMAL SYSTEM 21 MG: 21 PATCH, EXTENDED RELEASE TRANSDERMAL at 06:00

## 2021-09-03 RX ADMIN — DEXAMETHASONE 4 MG: 4 TABLET ORAL at 17:59

## 2021-09-03 RX ADMIN — DEXAMETHASONE 4 MG: 4 TABLET ORAL at 06:00

## 2021-09-03 RX ADMIN — LISINOPRIL 5 MG: 5 TABLET ORAL at 06:00

## 2021-09-03 RX ADMIN — OXYCODONE HYDROCHLORIDE AND ACETAMINOPHEN 1 TABLET: 5; 325 TABLET ORAL at 18:06

## 2021-09-03 RX ADMIN — POLYETHYLENE GLYCOL 3350 1 PACKET: 17 POWDER, FOR SOLUTION ORAL at 18:00

## 2021-09-03 RX ADMIN — DOCUSATE SODIUM 50 MG AND SENNOSIDES 8.6 MG 2 TABLET: 8.6; 5 TABLET, FILM COATED ORAL at 17:59

## 2021-09-03 RX ADMIN — DOCUSATE SODIUM 50 MG AND SENNOSIDES 8.6 MG 2 TABLET: 8.6; 5 TABLET, FILM COATED ORAL at 05:59

## 2021-09-03 RX ADMIN — OXYCODONE HYDROCHLORIDE AND ACETAMINOPHEN 1 TABLET: 5; 325 TABLET ORAL at 09:09

## 2021-09-03 RX ADMIN — SODIUM CHLORIDE, POTASSIUM CHLORIDE, SODIUM LACTATE AND CALCIUM CHLORIDE: 600; 310; 30; 20 INJECTION, SOLUTION INTRAVENOUS at 01:19

## 2021-09-03 ASSESSMENT — COGNITIVE AND FUNCTIONAL STATUS - GENERAL
SUGGESTED CMS G CODE MODIFIER MOBILITY: CH
SUGGESTED CMS G CODE MODIFIER DAILY ACTIVITY: CI
MOBILITY SCORE: 24
DAILY ACTIVITIY SCORE: 23
HELP NEEDED FOR BATHING: A LITTLE

## 2021-09-03 ASSESSMENT — ENCOUNTER SYMPTOMS
NERVOUS/ANXIOUS: 1
SHORTNESS OF BREATH: 0
BLURRED VISION: 0
NAUSEA: 0
EYES NEGATIVE: 1
BRUISES/BLEEDS EASILY: 0
ABDOMINAL PAIN: 0
WEAKNESS: 0
BACK PAIN: 1
VOMITING: 0
FEVER: 0
MYALGIAS: 1
HEADACHES: 0
SPUTUM PRODUCTION: 1
COUGH: 1
DIZZINESS: 0
CHILLS: 0
HEMOPTYSIS: 0
PALPITATIONS: 0
FOCAL WEAKNESS: 0

## 2021-09-03 ASSESSMENT — PAIN DESCRIPTION - PAIN TYPE
TYPE: ACUTE PAIN

## 2021-09-03 ASSESSMENT — ACTIVITIES OF DAILY LIVING (ADL): TOILETING: INDEPENDENT

## 2021-09-03 ASSESSMENT — GAIT ASSESSMENTS
GAIT LEVEL OF ASSIST: SUPERVISED
DISTANCE (FEET): 10
DEVIATION: NO DEVIATION

## 2021-09-03 NOTE — PROGRESS NOTES
Radiology Progress Note   Author: KAELA He Date & Time created: 9/3/2021  11:27 AM   Date of admission  8/28/2021  Note to reader: this note follows the APSO format rather than the historical SOAP format. Assessment and plan located at the top of the note for ease of use.    Chief Complaint  66 y.o. female admitted 8/28/2021 with   Chief Complaint   Patient presents with   • Low Back Pain     pt states low back painx1 month. pt states given tylenol by [facility]. pt ambulatory into triage room     HPI  Patient is a 66-year-old female with no medical history of smoking.  Patient was recently admitted to the hospital 8/22-8/23/21, for similar complaints of intractable back pain.  Patient presented on 8/28/2021 with complaints of intractable back pain to the lumbar region that has been going on for approximately 1 month.  Patient called EMS and was given Tylenol by the ambulance and transported to the emergency room.  Patient left AMA on previous admission however during that admission MRI of lumbar spine demonstrated acute/subacute compression fracture of L2, subacute fracture of L4, compression fracture of L3 and focal lesions on L4 concerning for pathological fracture. IR had previously been consulted and planned for osteocool RF ablation and biopsies however patient had left AMA.       CT chest abdomen pelvis completed in the emergency room which revealed bilateral pulmonary masses largest located in the right upper lobe measuring 2 x 2 cm possibly representing primary lung cancer with metastatic disease.  Additionally seen are enlarged right hilar and mediastinal lymph nodes consistent with metastatic adenopathy, an ill-defined mixed lytic and sclerotic bony metastasis     Assessment/Plan  Interval History   Principal Problem:    Metastatic disease (HCC)  Active Problems:    Lumbar compression fracture (HCC)    Tobacco dependence    Elevated alkaline phosphatase level    Pulmonary mass    H/O  noncompliance with medical treatment, presenting hazards to health      Plan IR  - Posterior back dressing change PRN   - Thank you for allowing Interventional Radiology team to participate in the patients care, if any additonal care or requests are needed in the future please do not hesitate call or place IR order      [id number]  IR:   9/2- Procedure(s): L2 and L4 Biopsy, Osteocool ablation and Kyphoplasty  9/3- Patient has complaints of soreness post procedure, education was provided that  This could last for 3-5 days but that she does have orders for pain medication. Denies any neuro, numbness or tingling deficits          Review of Systems  Physical Exam   Review of Systems   Constitutional: Positive for malaise/fatigue. Negative for chills and fever.   HENT: Negative for hearing loss.    Eyes: Negative for blurred vision.   Respiratory: Positive for cough. Negative for hemoptysis and shortness of breath.    Cardiovascular: Negative for chest pain and palpitations.   Gastrointestinal: Negative for abdominal pain and vomiting.   Genitourinary: Negative for dysuria.   Musculoskeletal: Positive for back pain and myalgias.   Skin: Negative for rash.   Neurological: Negative for dizziness, weakness and headaches.   Endo/Heme/Allergies: Does not bruise/bleed easily.   Psychiatric/Behavioral: Negative for suicidal ideas. The patient is nervous/anxious.       Vitals:    09/03/21 0700   BP: 140/72   Pulse: 85   Resp: 18   Temp: 36.4 °C (97.6 °F)   SpO2: 92%        Physical Exam  HENT:      Head: Normocephalic.      Nose: Nose normal.      Mouth/Throat:      Mouth: Mucous membranes are moist.   Eyes:      Pupils: Pupils are equal, round, and reactive to light.   Cardiovascular:      Rate and Rhythm: Normal rate.   Pulmonary:      Effort: Pulmonary effort is normal. No respiratory distress.   Abdominal:      General: Abdomen is flat.      Tenderness: There is no abdominal tenderness.   Musculoskeletal:         General: No  swelling, tenderness or deformity.      Cervical back: Normal range of motion.      Right lower leg: No edema.      Left lower leg: No edema.      Comments: Posterior back lumbar IR access sites CDI, dressing in place, no s/s of infection    Skin:     General: Skin is warm and dry.      Capillary Refill: Capillary refill takes less than 2 seconds.      Coloration: Skin is not jaundiced or pale.   Neurological:      General: No focal deficit present.      Mental Status: She is alert.      Motor: No weakness.   Psychiatric:         Attention and Perception: Attention normal.         Mood and Affect: Mood is anxious.         Behavior: Behavior is agitated.         Judgment: Judgment is impulsive.             Labs    Recent Labs     09/01/21 0025 09/02/21  0007   WBC 11.5* 9.2   RBC 4.81 4.75   HEMOGLOBIN 14.5 14.5   HEMATOCRIT 45.4 43.5   MCV 94.4 91.6   MCH 30.1 30.5   MCHC 31.9* 33.3*   RDW 49.1 47.8   PLATELETCT 316 312   MPV 9.6 9.1     Recent Labs     09/01/21 0025 09/02/21  0007   SODIUM 137 137   POTASSIUM 4.2 4.3   CHLORIDE 102 102   CO2 25 22   GLUCOSE 113* 134*   BUN 19 17   CREATININE 0.69 0.57   CALCIUM 8.9 8.9     Recent Labs     09/01/21 0025 09/02/21  0007   CREATININE 0.69 0.57     MR-BRAIN-WITH & W/O   Final Result      1.  No evidence of acute territorial infarct, intracranial hemorrhage or mass lesion.   2.  Mild microangiopathic ischemic change versus demyelination or gliosis.   3.  Pansinusitis.   4.  Bilateral mastoid effusions.      IR-KYPHOPLASTY,1 VERTEBRA,LUMBAR    (Results Pending)       INR   Date Value Ref Range Status   08/30/2021 1.01 0.87 - 1.13 Final     Comment:     INR - Non-therapeutic Reference Range: 0.87-1.13  INR - Therapeutic Reference Range: 2.0-4.0       No results found for: POCINR     Intake/Output Summary (Last 24 hours) at 9/3/2021 1127  Last data filed at 9/3/2021 0830  Gross per 24 hour   Intake 1240 ml   Output 20 ml   Net 1220 ml      Labs not explicitly included in  this progress note were reviewed by the author. Radiology/imaging not explicitly included in this progress note was reviewed by the author.   I have performed a physical exam and reviewed and updated ROS and Plan today (9/3/2021).     25 minutes in directly providing and coordinating care and extensive data review.  No time overlap and excludes procedures.

## 2021-09-03 NOTE — PROGRESS NOTES
Hospital Medicine Daily Progress Note    Date of Service  9/3/2021    Chief Complaint  Kayleen Alfredo is a 66 y.o. female admitted 8/28/2021 with intractable back pain    Hospital Course  Patient is a 66-year-old female with no medical history of smoking.  Patient was recently admitted to the hospital 8/22-8/23/21, for similar complaints of intractable back pain.  Patient presented on 8/28/2021 with complaints of intractable back pain to the lumbar region that has been going on for approximately 1 month.  Patient called EMS and was given Tylenol by the ambulance and transported to the emergency room.  Patient left AMA on previous admission however during that admission MRI of lumbar spine demonstrated acute/subacute compression fracture of L2, subacute fracture of L4, compression fracture of L3 and focal lesions on L4 concerning for pathological fracture.  IR had previously been consulted and planned for osteocool RF ablation and biopsies however patient had left AMA.  CT chest abdomen pelvis completed in the emergency room which revealed bilateral pulmonary masses largest located in the right upper lobe measuring 2 x 2 cm possibly representing primary lung cancer with metastatic disease.  Additionally seen are enlarged right hilar and mediastinal lymph nodes consistent with metastatic adenopathy, an ill-defined mixed lytic and sclerotic bony metastasis.     Patient underwent L2 and L4 biopsy, osteocool ablation and kyphoplasty on 9/2.    Interval Problem Update  9/3: Patient reports back pain 8/10, cough productive yellow sputum. Pain is controlled with prn medications. Denies SOB. Afebrile, hemodynamically stable. On room air. Ordered PT, OT evaluation. Bone biopsy L4 Positive for metastatic disease. Final diagnosis pending IHC studies to evaluate for tumor type and origin. Bone biopsy L2 negative for metastatic disease. PT, OT no further needs. Discussed in IDT rounds. Patient can be discharged to [facility]  with follow up outpatient oncology.      I have personally seen and examined the patient at bedside. I discussed the plan of care with patient, bedside RN, charge RN,  and pharmacy.    Consultants/Specialty  palliative care and IR    Code Status  DNAR/DNI    Disposition  Patient is medically cleared.   Anticipate discharge to to home with close outpatient follow-up.  I have placed the appropriate orders for post-discharge needs.    Review of Systems  Review of Systems   Constitutional: Negative for chills and fever.   HENT: Negative for hearing loss.    Eyes: Negative.    Respiratory: Positive for cough and sputum production. Negative for shortness of breath.    Cardiovascular: Negative for chest pain and leg swelling.   Gastrointestinal: Negative for abdominal pain, nausea and vomiting.   Genitourinary: Negative for dysuria.   Musculoskeletal: Positive for back pain.   Skin: Negative for rash.   Neurological: Negative for focal weakness and headaches.        Physical Exam  Temp:  [36.2 °C (97.1 °F)-36.9 °C (98.4 °F)] 36.4 °C (97.6 °F)  Pulse:  [59-90] 85  Resp:  [18-20] 18  BP: (140-203)/() 140/72  SpO2:  [90 %-95 %] 92 %    Physical Exam  Vitals and nursing note reviewed.   Constitutional:       Appearance: She is ill-appearing.   HENT:      Head: Normocephalic.      Mouth/Throat:      Mouth: Mucous membranes are moist.      Pharynx: Oropharynx is clear.   Eyes:      Pupils: Pupils are equal, round, and reactive to light.   Cardiovascular:      Rate and Rhythm: Normal rate and regular rhythm.      Pulses: Normal pulses.      Heart sounds: Normal heart sounds.   Pulmonary:      Effort: Pulmonary effort is normal.      Breath sounds: Wheezing and rales present.   Abdominal:      General: Abdomen is flat. Bowel sounds are normal.      Palpations: Abdomen is soft.      Tenderness: There is no abdominal tenderness.   Musculoskeletal:         General: Normal range of motion.      Cervical back: Normal  range of motion.   Skin:     General: Skin is warm.   Neurological:      General: No focal deficit present.      Mental Status: She is alert and oriented to person, place, and time.         Fluids    Intake/Output Summary (Last 24 hours) at 9/3/2021 1417  Last data filed at 9/3/2021 0830  Gross per 24 hour   Intake 1240 ml   Output 20 ml   Net 1220 ml       Laboratory  Recent Labs     09/01/21  0025 09/02/21  0007   WBC 11.5* 9.2   RBC 4.81 4.75   HEMOGLOBIN 14.5 14.5   HEMATOCRIT 45.4 43.5   MCV 94.4 91.6   MCH 30.1 30.5   MCHC 31.9* 33.3*   RDW 49.1 47.8   PLATELETCT 316 312   MPV 9.6 9.1     Recent Labs     09/01/21 0025 09/02/21  0007   SODIUM 137 137   POTASSIUM 4.2 4.3   CHLORIDE 102 102   CO2 25 22   GLUCOSE 113* 134*   BUN 19 17   CREATININE 0.69 0.57   CALCIUM 8.9 8.9                   Imaging  MR-BRAIN-WITH & W/O   Final Result      1.  No evidence of acute territorial infarct, intracranial hemorrhage or mass lesion.   2.  Mild microangiopathic ischemic change versus demyelination or gliosis.   3.  Pansinusitis.   4.  Bilateral mastoid effusions.      IR-KYPHOPLASTY,1 VERTEBRA,LUMBAR    (Results Pending)        Assessment/Plan  * Metastatic disease (HCC)- (present on admission)  Assessment & Plan  Pulmonary mass with suspected spinal lesions. IR Osteocool RF ablation + BX on L2 and L4 with GA scheduled for today. Continue pain medications and dexamethasone.    H/O noncompliance with medical treatment, presenting hazards to health  Assessment & Plan  Recently left AGAINST MEDICAL ADVICE.  Continue to  on importance of adhering to medical recommendations and treatment.    Pulmonary mass  Assessment & Plan  CT chest, abdomen, pelvis with contrast from 8/23/2021 showed bilateral pulmonary masses (largest measuring 2 x 2 cm) likely representing primary lung cancer with metastatic disease. She had enlarged right hilar and mediastinal lymph nodes consistent with metastatic adenopathy.    Elevated alkaline  phosphatase level- (present on admission)  Assessment & Plan  Likely due to metastatic disease.  Monitor    Tobacco dependence- (present on admission)  Assessment & Plan  Patient received counseling on tobacco cessation on admission.  Continue to  on quitting tobacco.     Lumbar compression fracture (HCC)- (present on admission)  Assessment & Plan  MR lumbar spine from 8/23/2001 showed acute/subacute compression fracture of L2, L4, and L3, an approximately 10 mm peripherally enhancing lesion in the L4 vertebral body, focal T2 hyper intense lesions in the iliac bones concerning for osseous lesions with pathological fractures. IR were previously consulted for ostial cool RF ablation with biopsies, patient left AMA. Now planned for today       VTE prophylaxis: enoxaparin ppx    I have performed a physical exam and reviewed and updated ROS and Plan today (9/3/2021). In review of yesterday's note (9/2/2021), there are no changes except as documented above.

## 2021-09-03 NOTE — THERAPY
"Physical Therapy   Initial Evaluation     Patient Name: Kayleen Alfredo  Age:  66 y.o., Sex:  female  Medical Record #: [id number]  Today's Date: 9/3/2021     Precautions  Precautions: Fall Risk  Comments:  COVID precautions    Assessment  Patient is a 66 y.o. female admitted with back pain which progressed to a kyphoplasty at L1 on 9/2. During imaging lung masses were also found. Pt is homeless and does not have social support, however she was ambulatory this visit w/o LOB or path deviation. She is not amenable to teaching regarding spine precautions, log rolling, or ascending and descending from the ground level to simulate her environment outside of the hospital. Given the above, a thorough PT exam was not able to be performed, however she is not demonstrating deficits that warrant ongoing PT.     Plan    Evaluation only    DC Equipment Recommendations:  None  Discharge Recommendations:  Anticipate that the patient will have no further physical therapy needs after discharge from the hospital       Subjective    Scattered tangential thoughts and reasoning; not agreeable to PT initially and then only participated under the persistence of the PT. After lengthy encouragement she suddenly jumped out of bed to use the bathroom all the while saying \"I can't walk\", but then walking to the bathroom and using the toilet w/o support or cues. Not agreeable to further assessment or teaching.     Objective       09/03/21 1218   Prior Living Situation   Prior Services None   Housing / Facility Homeless   Equipment Owned None   Comments pt is homeless, states she lives on the streets.    Prior Level of Functional Mobility   Comments independent with all mobility up and down off the ground to sleep and sit during the day   Cognition    Cognition / Consciousness X   Safety Awareness Impulsive   New Learning Impaired   Attention Impaired   Comments tangential in her thoughts; contradicts herself in conversation; i.e. \"I can't walk\" but " then jumped out of bed and walked to the bathroom; not amenable to learning   Active ROM Lower Body    Active ROM Lower Body  WDL   Strength Lower Body   Comments not able to formally test due to noncompliance   Strength Upper Body   Comments not able to formally test due to noncompliance   Gait Analysis   Gait Level Of Assist Supervised   Assistive Device None   Distance (Feet)   (10; only agreed to BR and back)   # of Times Distance was Traveled 2   Deviation No deviation   Comments pt not agreeable to thorough PT assessment   Bed Mobility    Supine to Sit Modified Independent   Sit to Supine Modified Independent   Scooting Independent   Rolling Independent   Comments impulsive; not following cues for log rolling   Functional Mobility   Sit to Stand Independent   Mobility in room   How much help from another person does the patient currently need...   6 clicks Mobility Score 24     Sarita Yap, PT  [id number]

## 2021-09-03 NOTE — THERAPY
"Occupational Therapy   Initial Evaluation     Patient Name: Kayleen Alfredo  Age:  66 y.o., Sex:  female  Medical Record #: [id number]  Today's Date: 9/3/2021     Precautions  Precautions: Fall Risk  Comments: 2/2 behaviors, COVID-19    Assessment  Patient is 66 y.o. female presents to OT services following kyphoplasty, osteocool RF ablation and biopsy on L2 and L4. Pt initially agreeable to therapy and education on spinal precautions; however once eob pt declined most of the ADLs and ambulated ~10ft w/o AD, no lob noted. Pt demonstrates physical capabilities to perform ADLs at supervision/MI but is limited by her self limiting behaviors and non-compliance w/ therapist recommendations. +    **Pt is supervision only in this setting d/t line management while in hospital otherwise pt is Independent and likely at her functional baseline.**    Plan    Recommend Occupational Therapy for Evaluation only     DC Equipment Recommendations: None  Discharge Recommendations: Anticipate that the patient will have no further occupational therapy needs after discharge from the hospital      Objective       09/03/21 1249   Prior Living Situation   Prior Services None   Housing / Facility Homeless   Equipment Owned None   Lives with - Patient's Self Care Capacity Alone and Able to Care For Self   Comments Pt reports living in shelter; however when asked questions regarding PLOF pt gets agitated and voclaizes \"I don't know, okay\"    Prior Level of ADL Function   Self Feeding Independent   Grooming / Hygiene Independent   Bathing Independent   Dressing Independent   Toileting Independent   Prior Level of IADL Function   Medication Management Independent   Laundry Independent   Finances Independent   Home Management Independent   Shopping Independent   Prior Level Of Mobility Independent Without Device in Community   Driving / Transportation Walks;Utilizes Public Transportation   Occupation (Pre-Hospital Vocational) Not Employed   Cognition " "   Cognition / Consciousness X   Attention Impaired   Comments not receptive of education, any attempts made would vocalize \"I know, I know\" likely baseline behaviors given h/o leaving AMA    Balance Assessment   Sitting Balance (Static) Fair +   Sitting Balance (Dynamic) Fair +   Standing Balance (Static) Good   Standing Balance (Dynamic) Fair   Weight Shift Sitting Good   Weight Shift Standing Good   Comments w/o AD, no lob noted, distance limited d/t pt's behaviors   Bed Mobility    Supine to Sit Supervised   Sit to Supine Supervised   Scooting Supervised   Rolling Supervised   Comments attempted log rolling but poor follow through   ADL Assessment   Eating Modified Independent  (able to drink water w/o assist, declines any issues)   Grooming Supervision;Standing   Upper Body Dressing Supervision   Lower Body Dressing Supervision   Toileting Supervision   Comments simulated as pt declined to perform most of the tasks  staing \"I can do it,  I know how to do it\"    Functional Mobility   Sit to Stand Supervised   Bed, Chair, Wheelchair Transfer Supervised   Toilet Transfers Supervised   Comments w/o AD, demonstrates physical capability    Anticipated Discharge Equipment and Recommendations   DC Equipment Recommendations None               "

## 2021-09-03 NOTE — CARE PLAN
The patient is Stable - Low risk of patient condition declining or worsening    Shift Goals  Clinical Goals: Pain Management  Patient Goals: Pain Managment  Family Goals: Education    Progress made toward(s) clinical / shift goals:    Problem: Pain - Standard  Goal: Alleviation of pain or a reduction in pain to the patient’s comfort goal  Outcome: Progressing  Pain being managed with po/prn medications.  Patient reported pain 7/10 during morning assessment.     Problem: Knowledge Deficit - Standard  Goal: Patient and family/care givers will demonstrate understanding of plan of care, disease process/condition, diagnostic tests and medications  Outcome: Progressing  Provider discussed outcome of surgery at bedside with patient.       Patient is not progressing towards the following goals:

## 2021-09-03 NOTE — DISCHARGE SUMMARY
Discharge Summary    CHIEF COMPLAINT ON ADMISSION  Chief Complaint   Patient presents with   • Low Back Pain     pt states low back painx1 month. pt states given tylenol by [facility]. pt ambulatory into triage room       Reason for Admission  low back pain     Admission Date  8/28/2021    CODE STATUS  DNAR/DNI    HPI & HOSPITAL COURSE    Patient is a 66-year-old female with no medical history of smoking.  Patient was recently admitted to the hospital 8/22-8/23/21, for similar complaints of intractable back pain.  Patient presented on 8/28/2021 with complaints of intractable back pain to the lumbar region that has been going on for approximately 1 month.  Patient called EMS and was given Tylenol by the ambulance and transported to the emergency room.  Patient left AMA on previous admission however during that admission MRI of lumbar spine demonstrated acute/subacute compression fracture of L2, subacute fracture of L4, compression fracture of L3 and focal lesions on L4 concerning for pathological fracture.  IR had previously been consulted and planned for osteocool RF ablation and biopsies however patient had left AMA.  CT chest abdomen pelvis completed in the emergency room which revealed bilateral pulmonary masses largest located in the right upper lobe measuring 2 x 2 cm possibly representing primary lung cancer with metastatic disease.  Additionally seen are enlarged right hilar and mediastinal lymph nodes consistent with metastatic adenopathy, an ill-defined mixed lytic and sclerotic bony metastasis.     COVID 19 test from 9/2 positive.  Patient underwent L2 and L4 biopsy, osteocool ablation and kyphoplasty on 9/2.     9/3: Patient reports back pain 8/10, cough productive yellow sputum. Pain is controlled with prn medications. Denies SOB. Afebrile, hemodynamically stable. On room air. Ordered PT, OT evaluation. Bone biopsy L4 Positive for metastatic disease. Final diagnosis pending IHC studies to evaluate for tumor  type and origin. Bone biopsy L2 negative for metastatic disease. PT, OT no further needs. Discussed in IDT rounds. Patient can be discharged to COVID housing with follow up outpatient oncology.    Therefore, she is discharged in fair and stable condition to home with close outpatient follow-up.    The patient met 2-midnight criteria for an inpatient stay at the time of discharge.    Discharge Date  9/3/2021    FOLLOW UP ITEMS POST DISCHARGE  Follow up with PCP  Follow up with outpatient oncology     DISCHARGE DIAGNOSES  Principal Problem:    Metastatic disease (HCC) POA: Yes  Active Problems:    Lumbar compression fracture (HCC) POA: Yes    Tobacco dependence (Chronic) POA: Yes    Elevated alkaline phosphatase level POA: Yes    Pulmonary mass POA: Unknown    H/O noncompliance with medical treatment, presenting hazards to health POA: Unknown  Resolved Problems:    * No resolved hospital problems. *      FOLLOW UP  No future appointments.  [facility]  [address] [employer]  [facility] [id number] [phone]  In 1 week      Conchis Davies [facility]  [id number]  [facility] [address] [phone]    In 1 week        MEDICATIONS ON DISCHARGE     Medication List      START taking these medications      Instructions   dexamethasone 4 MG Tabs  Commonly known as: DECADRON   Take 1 Tablet by mouth every 12 hours for 4 days.  Dose: 4 mg     lisinopril 5 MG Tabs  Start taking on: September 4, 2021  Commonly known as: PRINIVIL   Take 1 Tablet by mouth every day.  Dose: 5 mg     oxyCODONE-acetaminophen 5-325 MG Tabs  Commonly known as: PERCOCET   Take 1 Tablet by mouth every 6 hours as needed for up to 3 days.  Dose: 1 Tablet            Allergies  Allergies   Allergen Reactions   • Macrodantin [Nitrofurantoin]      High fever       DIET  Orders Placed This Encounter   Procedures   • Diet Order Diet: Full Liquid     Standing Status:   Standing     Number of Occurrences:   1     Order Specific Question:    Diet:     Answer:   Full Liquid [11]       ACTIVITY  As tolerated.  Weight bearing as tolerated    CONSULTATIONS  Palliative care  IR    PROCEDURES  9/2/2021: L2 and L4 Biopsy, Osteocool ablation and Kyphoplasty    LABORATORY  Lab Results   Component Value Date    SODIUM 137 09/02/2021    POTASSIUM 4.3 09/02/2021    CHLORIDE 102 09/02/2021    CO2 22 09/02/2021    GLUCOSE 134 (H) 09/02/2021    BUN 17 09/02/2021    CREATININE 0.57 09/02/2021        Lab Results   Component Value Date    WBC 9.2 09/02/2021    HEMOGLOBIN 14.5 09/02/2021    HEMATOCRIT 43.5 09/02/2021    PLATELETCT 312 09/02/2021        Total time of the discharge process exceeds 35 minutes.

## 2021-09-03 NOTE — CARE PLAN
The patient is Stable - Low risk of patient condition declining or worsening    Shift Goals  Clinical Goals: Tolerate Procedures well  Patient Goals: pain control  Family Goals: not present    Progress made toward(s) clinical / shift goals:    Problem: Pain - Standard  Goal: Alleviation of pain or a reduction in pain to the patient’s comfort goal  Outcome: Progressing  Note: Pain appeared to be well controlled today. Pt often found resting in bed and when returned from procedure was able to rest comfortably after reapportioning the Pt and giving a heated blanket.      Problem: Knowledge Deficit - Standard  Goal: Patient and family/care givers will demonstrate understanding of plan of care, disease process/condition, diagnostic tests and medications  Outcome: Progressing  Note: Pt tolerated procedure well today. Resting comfortably in bed with all questions and concerns addressed.        Patient is not progressing towards the following goals:

## 2021-09-03 NOTE — CARE PLAN
The patient is Stable - Low risk of patient condition declining or worsening    Shift Goals  Clinical Goals: Tolerate Procedures well  Patient Goals: pain control  Family Goals: not present    Progress made toward(s) clinical / shift goals:    Problem: Pain - Standard  Goal: Alleviation of pain or a reduction in pain to the patient’s comfort goal  Outcome: Progressing     Problem: Knowledge Deficit - Standard  Goal: Patient and family/care givers will demonstrate understanding of plan of care, disease process/condition, diagnostic tests and medications  Outcome: Progressing       Patient is not progressing towards the following goals:

## 2021-09-04 PROCEDURE — 700111 HCHG RX REV CODE 636 W/ 250 OVERRIDE (IP): Performed by: STUDENT IN AN ORGANIZED HEALTH CARE EDUCATION/TRAINING PROGRAM

## 2021-09-04 PROCEDURE — 700102 HCHG RX REV CODE 250 W/ 637 OVERRIDE(OP): Performed by: INTERNAL MEDICINE

## 2021-09-04 PROCEDURE — 700105 HCHG RX REV CODE 258: Performed by: INTERNAL MEDICINE

## 2021-09-04 PROCEDURE — 99232 SBSQ HOSP IP/OBS MODERATE 35: CPT | Performed by: STUDENT IN AN ORGANIZED HEALTH CARE EDUCATION/TRAINING PROGRAM

## 2021-09-04 PROCEDURE — 770001 HCHG ROOM/CARE - MED/SURG/GYN PRIV*

## 2021-09-04 PROCEDURE — A9270 NON-COVERED ITEM OR SERVICE: HCPCS | Performed by: INTERNAL MEDICINE

## 2021-09-04 PROCEDURE — 700111 HCHG RX REV CODE 636 W/ 250 OVERRIDE (IP): Performed by: INTERNAL MEDICINE

## 2021-09-04 PROCEDURE — 700102 HCHG RX REV CODE 250 W/ 637 OVERRIDE(OP): Performed by: NURSE PRACTITIONER

## 2021-09-04 PROCEDURE — A9270 NON-COVERED ITEM OR SERVICE: HCPCS | Performed by: NURSE PRACTITIONER

## 2021-09-04 PROCEDURE — A9270 NON-COVERED ITEM OR SERVICE: HCPCS | Performed by: STUDENT IN AN ORGANIZED HEALTH CARE EDUCATION/TRAINING PROGRAM

## 2021-09-04 PROCEDURE — 700102 HCHG RX REV CODE 250 W/ 637 OVERRIDE(OP): Performed by: STUDENT IN AN ORGANIZED HEALTH CARE EDUCATION/TRAINING PROGRAM

## 2021-09-04 RX ORDER — ACETAMINOPHEN 325 MG/1
650 TABLET ORAL EVERY 8 HOURS
Status: DISPENSED | OUTPATIENT
Start: 2021-09-04 | End: 2021-09-06

## 2021-09-04 RX ORDER — AMLODIPINE BESYLATE 5 MG/1
5 TABLET ORAL
Status: DISCONTINUED | OUTPATIENT
Start: 2021-09-04 | End: 2021-09-11 | Stop reason: HOSPADM

## 2021-09-04 RX ADMIN — ENALAPRILAT 1.25 MG: 1.25 INJECTION INTRAVENOUS at 05:24

## 2021-09-04 RX ADMIN — ACETAMINOPHEN 650 MG: 325 TABLET, FILM COATED ORAL at 16:21

## 2021-09-04 RX ADMIN — HYDROMORPHONE HYDROCHLORIDE 1 MG: 1 INJECTION, SOLUTION INTRAMUSCULAR; INTRAVENOUS; SUBCUTANEOUS at 09:25

## 2021-09-04 RX ADMIN — NICOTINE TRANSDERMAL SYSTEM 21 MG: 21 PATCH, EXTENDED RELEASE TRANSDERMAL at 05:16

## 2021-09-04 RX ADMIN — OXYCODONE HYDROCHLORIDE AND ACETAMINOPHEN 1 TABLET: 5; 325 TABLET ORAL at 00:02

## 2021-09-04 RX ADMIN — OXYCODONE HYDROCHLORIDE AND ACETAMINOPHEN 1 TABLET: 5; 325 TABLET ORAL at 07:47

## 2021-09-04 RX ADMIN — LISINOPRIL 5 MG: 5 TABLET ORAL at 05:16

## 2021-09-04 RX ADMIN — AMLODIPINE BESYLATE 5 MG: 5 TABLET ORAL at 07:47

## 2021-09-04 RX ADMIN — DOCUSATE SODIUM 50 MG AND SENNOSIDES 8.6 MG 2 TABLET: 8.6; 5 TABLET, FILM COATED ORAL at 05:16

## 2021-09-04 RX ADMIN — HYDROMORPHONE HYDROCHLORIDE 1 MG: 1 INJECTION, SOLUTION INTRAMUSCULAR; INTRAVENOUS; SUBCUTANEOUS at 16:21

## 2021-09-04 RX ADMIN — DOCUSATE SODIUM 50 MG AND SENNOSIDES 8.6 MG 2 TABLET: 8.6; 5 TABLET, FILM COATED ORAL at 18:08

## 2021-09-04 RX ADMIN — DEXAMETHASONE 4 MG: 4 TABLET ORAL at 18:09

## 2021-09-04 RX ADMIN — DEXAMETHASONE 4 MG: 4 TABLET ORAL at 05:16

## 2021-09-04 RX ADMIN — SODIUM CHLORIDE, POTASSIUM CHLORIDE, SODIUM LACTATE AND CALCIUM CHLORIDE: 600; 310; 30; 20 INJECTION, SOLUTION INTRAVENOUS at 00:03

## 2021-09-04 RX ADMIN — OXYCODONE HYDROCHLORIDE AND ACETAMINOPHEN 1 TABLET: 5; 325 TABLET ORAL at 20:51

## 2021-09-04 RX ADMIN — ACETAMINOPHEN 650 MG: 325 TABLET, FILM COATED ORAL at 20:51

## 2021-09-04 RX ADMIN — OXYCODONE HYDROCHLORIDE AND ACETAMINOPHEN 1 TABLET: 5; 325 TABLET ORAL at 14:47

## 2021-09-04 ASSESSMENT — PAIN DESCRIPTION - PAIN TYPE
TYPE: ACUTE PAIN

## 2021-09-04 ASSESSMENT — FIBROSIS 4 INDEX: FIB4 SCORE: 1.113588507968434936

## 2021-09-04 NOTE — DISCHARGE PLANNING
Anticipated Discharge Disposition:   [facility] [place]  Meds to Beds    Action:    RN SATHYA spoke with patient at bedside this afternoon.  Explained [facility] and she provided consent to send the referral.      Sony,  with [facility] reviewed the referral with their APRN and patient declined because they cannot support OT recommendations for supervision as well as patient is a fall risk because of her back injury.    Informed bedside AIDA Ivy and Charge RN Latrell.    Barriers to Discharge:    Placement acceptance  Covid positive on 9-2-21  Pt homeless    Plan:    Request repeat OT/PT evaluations.  [facility]

## 2021-09-04 NOTE — DISCHARGE PLANNING
Meds-to-Beds: Discharge prescription orders listed below delivered to patient's bedside. RN Cata notified. Called patient for telephone consult, unable to reach patient. Approved Services per CM except for oxycodone. Patient elected to have co-payment billed to patient account.     Current Outpatient Medications   Medication Sig Dispense Refill   • dexamethasone (DECADRON) 4 MG Tab Take 1 Tablet by mouth every 12 hours until all taken. 10 Tablet 0   • [START ON 9/4/2021] lisinopril (PRINIVIL) 5 MG Tab Take 1 Tablet by mouth every day. 30 Tablet 3   • oxyCODONE-acetaminophen (PERCOCET) 5-325 MG Tab Take 1 Tablet by mouth every 6 hours as needed for up to 3 days. 12 Tablet 0      Mari Watkins, PharmD

## 2021-09-04 NOTE — PROGRESS NOTES
4 Eyes Skin Assessment Completed by AIDA Corrales and AIDA Jaramillo.    Head WDL  Ears WDL  Nose WDL  Mouth WDL  Neck WDL  Breast/Chest WDL  Shoulder Blades Redness and Blanching  Spine Redness and Blanching, Surgical Site  (R) Arm/Elbow/Hand WDL  (L) Arm/Elbow/Hand WDL  Abdomen WDL  Groin WDL  Scrotum/Coccyx/Buttocks Redness and Blanching  (R) Leg Bruising  (L) Leg Bruising  (R) Heel/Foot/Toe Redness and Blanching  (L) Heel/Foot/Toe Redness and Blanching          Devices In Places Blood Pressure Cuff and Pulse Ox      Interventions In Place Heel Mepilex, Sacral Mepilex, Pillows, Heels Loaded W/Pillows and Pressure Redistribution Mattress    Possible Skin Injury No    Pictures Uploaded Into Epic N/A  Wound Consult Placed N/A  RN Wound Prevention Protocol Ordered No

## 2021-09-04 NOTE — PROGRESS NOTES
Hospital Medicine Daily Progress Note    Date of Service  9/4/2021    Chief Complaint  Kayleen Alfredo is a 66 y.o. female admitted 8/28/2021 with intractable back pain    Hospital Course  Patient is a 66-year-old female with no medical history of smoking.  Patient was recently admitted to the hospital 8/22-8/23/21, for similar complaints of intractable back pain.  Patient presented on 8/28/2021 with complaints of intractable back pain to the lumbar region that has been going on for approximately 1 month.  Patient called EMS and was given Tylenol by the ambulance and transported to the emergency room.  Patient left AMA on previous admission however during that admission MRI of lumbar spine demonstrated acute/subacute compression fracture of L2, subacute fracture of L4, compression fracture of L3 and focal lesions on L4 concerning for pathological fracture.  IR had previously been consulted and planned for osteocool RF ablation and biopsies however patient had left AMA.  CT chest abdomen pelvis completed in the emergency room which revealed bilateral pulmonary masses largest located in the right upper lobe measuring 2 x 2 cm possibly representing primary lung cancer with metastatic disease.  Additionally seen are enlarged right hilar and mediastinal lymph nodes consistent with metastatic adenopathy, an ill-defined mixed lytic and sclerotic bony metastasis.     Patient underwent L2 and L4 biopsy, osteocool ablation and kyphoplasty on 9/2.     9/3: Patient reports back pain 8/10, cough productive yellow sputum. Pain is controlled with prn medications. Denies SOB. Afebrile, hemodynamically stable. On room air. Ordered PT, OT evaluation. Bone biopsy L4 Positive for metastatic disease. Final diagnosis pending IHC studies to evaluate for tumor type and origin. Bone biopsy L2 negative for metastatic disease. PT, OT no further needs. Discussed in IDT rounds. Patient can be discharged to [facility] with follow up outpatient  oncology.    Interval Problem Update  9/4: Afebrile, BP elevated. Started amlodipine 5 mg, continue lisinoplril. COVID housing declined the patient    I have personally seen and examined the patient at bedside. I discussed the plan of care with patient, bedside RN, charge RN and .    Consultants/Specialty  palliative care and IR    Code Status  DNAR/DNI    Disposition  Patient is medically cleared.   Anticipate discharge to to home with close outpatient follow-up.  I have placed the appropriate orders for post-discharge needs.    Review of Systems  Review of Systems   Constitutional: Negative for chills and fever.   HENT: Negative for hearing loss.    Eyes: Negative.    Respiratory: Positive for cough and sputum production. Negative for shortness of breath.    Cardiovascular: Negative for chest pain and leg swelling.   Gastrointestinal: Negative for abdominal pain, nausea and vomiting.   Genitourinary: Negative for dysuria.   Musculoskeletal: Positive for back pain.   Skin: Negative for rash.   Neurological: Negative for focal weakness and headaches.        Physical Exam  Temp:  [36 °C (96.8 °F)-36.6 °C (97.8 °F)] 36.6 °C (97.8 °F)  Pulse:  [63-69] 69  Resp:  [18] 18  BP: (140-217)/(77-90) 140/77  SpO2:  [94 %-95 %] 94 %    Physical Exam  Vitals and nursing note reviewed.   Constitutional:       Appearance: She is ill-appearing.   HENT:      Head: Normocephalic.      Mouth/Throat:      Mouth: Mucous membranes are moist.      Pharynx: Oropharynx is clear.   Eyes:      Pupils: Pupils are equal, round, and reactive to light.   Cardiovascular:      Rate and Rhythm: Normal rate and regular rhythm.      Pulses: Normal pulses.      Heart sounds: Normal heart sounds.   Pulmonary:      Effort: Pulmonary effort is normal.      Breath sounds: Wheezing and rales present.   Abdominal:      General: Abdomen is flat. Bowel sounds are normal.      Palpations: Abdomen is soft.      Tenderness: There is no abdominal  tenderness.   Musculoskeletal:         General: Normal range of motion.      Cervical back: Normal range of motion.   Skin:     General: Skin is warm.   Neurological:      General: No focal deficit present.      Mental Status: She is alert and oriented to person, place, and time.     Fluids    Intake/Output Summary (Last 24 hours) at 9/4/2021 1519  Last data filed at 9/4/2021 1400  Gross per 24 hour   Intake 240 ml   Output --   Net 240 ml       Laboratory  Recent Labs     09/02/21  0007   WBC 9.2   RBC 4.75   HEMOGLOBIN 14.5   HEMATOCRIT 43.5   MCV 91.6   MCH 30.5   MCHC 33.3*   RDW 47.8   PLATELETCT 312   MPV 9.1     Recent Labs     09/02/21  0007   SODIUM 137   POTASSIUM 4.3   CHLORIDE 102   CO2 22   GLUCOSE 134*   BUN 17   CREATININE 0.57   CALCIUM 8.9                   Imaging  MR-BRAIN-WITH & W/O   Final Result      1.  No evidence of acute territorial infarct, intracranial hemorrhage or mass lesion.   2.  Mild microangiopathic ischemic change versus demyelination or gliosis.   3.  Pansinusitis.   4.  Bilateral mastoid effusions.      IR-KYPHOPLASTY,1 VERTEBRA,LUMBAR    (Results Pending)        Assessment/Plan  * Metastatic disease (HCC)- (present on admission)  Assessment & Plan  Pulmonary mass with suspected spinal lesions. IR Osteocool RF ablation + BX on L2 and L4 with GA scheduled for today. Continue pain medications and dexamethasone.    H/O noncompliance with medical treatment, presenting hazards to health  Assessment & Plan  Recently left AGAINST MEDICAL ADVICE.  Continue to  on importance of adhering to medical recommendations and treatment.    Pulmonary mass  Assessment & Plan  CT chest, abdomen, pelvis with contrast from 8/23/2021 showed bilateral pulmonary masses (largest measuring 2 x 2 cm) likely representing primary lung cancer with metastatic disease. She had enlarged right hilar and mediastinal lymph nodes consistent with metastatic adenopathy.    Elevated alkaline phosphatase level-  (present on admission)  Assessment & Plan  Likely due to metastatic disease.  Monitor    Tobacco dependence- (present on admission)  Assessment & Plan  Patient received counseling on tobacco cessation on admission.  Continue to  on quitting tobacco.     Lumbar compression fracture (HCC)- (present on admission)  Assessment & Plan  MR lumbar spine from 8/23/2001 showed acute/subacute compression fracture of L2, L4, and L3, an approximately 10 mm peripherally enhancing lesion in the L4 vertebral body, focal T2 hyper intense lesions in the iliac bones concerning for osseous lesions with pathological fractures. IR were previously consulted for ostial cool RF ablation with biopsies, patient left AMA. Now planned for today       VTE prophylaxis: enoxaparin ppx    I have performed a physical exam and reviewed and updated ROS and Plan today (9/4/2021). In review of yesterday's note (9/3/2021), there are no changes except as documented above.

## 2021-09-04 NOTE — CARE PLAN
The patient is Stable - Low risk of patient condition declining or worsening    Shift Goals  Clinical Goals: Pain Management  Patient Goals: Pain Managment  Family Goals: Education    Progress made toward(s) clinical / shift goals:      Problem: Pain - Standard  Goal: Alleviation of pain or a reduction in pain to the patient’s comfort goal  Outcome: Progressing     Problem: Knowledge Deficit - Standard  Goal: Patient and family/care givers will demonstrate understanding of plan of care, disease process/condition, diagnostic tests and medications  Outcome: Progressing       Patient is not progressing towards the following goals:

## 2021-09-04 NOTE — CARE PLAN
The patient is Stable - Low risk of patient condition declining or worsening    Shift Goals  Clinical Goals: Pain Managment  Patient Goals: Comfort/Rest  Family Goals: MARCUS    Progress made toward(s) clinical / shift goals:    Problem: Pain - Standard  Goal: Alleviation of pain or a reduction in pain to the patient’s comfort goal  Outcome: Progressing  Pain being managed to patient comfort with prn medications, repositioning and distraction.     Problem: Knowledge Deficit - Standard  Goal: Patient and family/care givers will demonstrate understanding of plan of care, disease process/condition, diagnostic tests and medications  Outcome: Progressing  Discussed plan of care and discharge with patient, who verbalized understanding.       Patient is not progressing towards the following goals:

## 2021-09-04 NOTE — PROGRESS NOTES
Assumed care of pt at 0700. Pt alert and oriented, is agitated and somewhat uncooperative. Patient reported significant back pain today, which will be monitored and treated with prn medications and repositioning.  Patient is hypertensive today despite scheduled and prn medications. Provider notified.  Bed low & locked, alarm on. Reviewed plan for day.  Hourly rounding continues.    1144:  Patient ambulating to bathroom without assistance and with steady gait.  Bed alarm discontinued.  Pain improved after prn medication, hypertension resolved with scheduled amlodipine at 1030.     1455:  Discontinued undocumented PIV in left forearm.  Line was occluded.

## 2021-09-05 PROCEDURE — 700102 HCHG RX REV CODE 250 W/ 637 OVERRIDE(OP): Performed by: NURSE PRACTITIONER

## 2021-09-05 PROCEDURE — 700111 HCHG RX REV CODE 636 W/ 250 OVERRIDE (IP): Performed by: STUDENT IN AN ORGANIZED HEALTH CARE EDUCATION/TRAINING PROGRAM

## 2021-09-05 PROCEDURE — 700102 HCHG RX REV CODE 250 W/ 637 OVERRIDE(OP): Performed by: STUDENT IN AN ORGANIZED HEALTH CARE EDUCATION/TRAINING PROGRAM

## 2021-09-05 PROCEDURE — 700111 HCHG RX REV CODE 636 W/ 250 OVERRIDE (IP): Performed by: INTERNAL MEDICINE

## 2021-09-05 PROCEDURE — A9270 NON-COVERED ITEM OR SERVICE: HCPCS | Performed by: STUDENT IN AN ORGANIZED HEALTH CARE EDUCATION/TRAINING PROGRAM

## 2021-09-05 PROCEDURE — 770001 HCHG ROOM/CARE - MED/SURG/GYN PRIV*

## 2021-09-05 PROCEDURE — 700101 HCHG RX REV CODE 250: Performed by: STUDENT IN AN ORGANIZED HEALTH CARE EDUCATION/TRAINING PROGRAM

## 2021-09-05 PROCEDURE — 700102 HCHG RX REV CODE 250 W/ 637 OVERRIDE(OP): Performed by: INTERNAL MEDICINE

## 2021-09-05 PROCEDURE — 99232 SBSQ HOSP IP/OBS MODERATE 35: CPT | Performed by: STUDENT IN AN ORGANIZED HEALTH CARE EDUCATION/TRAINING PROGRAM

## 2021-09-05 PROCEDURE — A9270 NON-COVERED ITEM OR SERVICE: HCPCS | Performed by: INTERNAL MEDICINE

## 2021-09-05 PROCEDURE — A9270 NON-COVERED ITEM OR SERVICE: HCPCS | Performed by: NURSE PRACTITIONER

## 2021-09-05 RX ORDER — LIDOCAINE 50 MG/G
1 PATCH TOPICAL EVERY 24 HOURS
Status: DISCONTINUED | OUTPATIENT
Start: 2021-09-05 | End: 2021-09-11 | Stop reason: HOSPADM

## 2021-09-05 RX ORDER — HYDROMORPHONE HYDROCHLORIDE 1 MG/ML
0.5 INJECTION, SOLUTION INTRAMUSCULAR; INTRAVENOUS; SUBCUTANEOUS
Status: DISCONTINUED | OUTPATIENT
Start: 2021-09-05 | End: 2021-09-06

## 2021-09-05 RX ORDER — LISINOPRIL 10 MG/1
10 TABLET ORAL
Status: DISCONTINUED | OUTPATIENT
Start: 2021-09-06 | End: 2021-09-11 | Stop reason: HOSPADM

## 2021-09-05 RX ADMIN — OXYCODONE HYDROCHLORIDE AND ACETAMINOPHEN 1 TABLET: 5; 325 TABLET ORAL at 13:02

## 2021-09-05 RX ADMIN — LIDOCAINE 1 PATCH: 50 PATCH TOPICAL at 16:31

## 2021-09-05 RX ADMIN — ACETAMINOPHEN 650 MG: 325 TABLET, FILM COATED ORAL at 05:02

## 2021-09-05 RX ADMIN — DEXAMETHASONE 4 MG: 4 TABLET ORAL at 05:08

## 2021-09-05 RX ADMIN — HYDROMORPHONE HYDROCHLORIDE 1 MG: 1 INJECTION, SOLUTION INTRAMUSCULAR; INTRAVENOUS; SUBCUTANEOUS at 09:14

## 2021-09-05 RX ADMIN — AMLODIPINE BESYLATE 5 MG: 5 TABLET ORAL at 05:07

## 2021-09-05 RX ADMIN — OXYCODONE HYDROCHLORIDE AND ACETAMINOPHEN 1 TABLET: 5; 325 TABLET ORAL at 05:02

## 2021-09-05 RX ADMIN — LISINOPRIL 5 MG: 5 TABLET ORAL at 05:08

## 2021-09-05 RX ADMIN — DOCUSATE SODIUM 50 MG AND SENNOSIDES 8.6 MG 2 TABLET: 8.6; 5 TABLET, FILM COATED ORAL at 16:31

## 2021-09-05 RX ADMIN — NICOTINE TRANSDERMAL SYSTEM 21 MG: 21 PATCH, EXTENDED RELEASE TRANSDERMAL at 05:02

## 2021-09-05 RX ADMIN — DOCUSATE SODIUM 50 MG AND SENNOSIDES 8.6 MG 2 TABLET: 8.6; 5 TABLET, FILM COATED ORAL at 05:07

## 2021-09-05 RX ADMIN — HYDROMORPHONE HYDROCHLORIDE 0.5 MG: 1 INJECTION, SOLUTION INTRAMUSCULAR; INTRAVENOUS; SUBCUTANEOUS at 15:45

## 2021-09-05 RX ADMIN — DEXAMETHASONE 4 MG: 4 TABLET ORAL at 16:31

## 2021-09-05 RX ADMIN — OXYCODONE HYDROCHLORIDE AND ACETAMINOPHEN 1 TABLET: 5; 325 TABLET ORAL at 23:14

## 2021-09-05 RX ADMIN — ACETAMINOPHEN 650 MG: 325 TABLET, FILM COATED ORAL at 15:32

## 2021-09-05 ASSESSMENT — PAIN DESCRIPTION - PAIN TYPE
TYPE: ACUTE PAIN

## 2021-09-05 NOTE — CARE PLAN
The patient is Stable - Low risk of patient condition declining or worsening    Shift Goals  Clinical Goals: pain management  Patient Goals: sleep  Family Goals: MARCUS    Progress made toward(s) clinical / shift goals:      Patient verbalizes pain on 0-10 scale and prn pain medication administered per order. Patient is able to sleep comfortably.    Patient is not progressing towards the following goals:      Problem: Pain - Standard  Goal: Alleviation of pain or a reduction in pain to the patient’s comfort goal  Outcome: Progressing     Problem: Knowledge Deficit - Standard  Goal: Patient and family/care givers will demonstrate understanding of plan of care, disease process/condition, diagnostic tests and medications  Outcome: Progressing

## 2021-09-05 NOTE — DISCHARGE PLANNING
"Anticipated Disposition  COVID housing    Action:   -> Chart review completed.Pt is medically cleared, awaiting acceptance at [facility] COVID housing program, and COVID housing ADL form is needed.      ~ This CM completed the form and emailed back. This CM also called Meds to Beds, prescriptions were filled and sent to Pt 2 days ago.              Barriers to Discharge:   COVID housing acceptance      Plan:  Please continue to follow up with [facility] for acceptance.      Addendum:     2pm     Ingrid MENDES emailed this CM back, reported, \" Due to Ms. Alfredo’s needs and the limits of the housing unit’s ability to meet those needs, there is no ETA for admittance. We will not be admitting Ms. Alfredo into the COVID housing program. \"  This CM replied back to Ingrid requested further explanation re: needs, as Pt is independent can care for herself.       ~ Please continue to follow up with COVID housing.       "

## 2021-09-05 NOTE — DISCHARGE PLANNING
Anticipated Discharge Disposition:   Covid Housing  Meds to Beds    Action:    RN CM resent a referral to [facility] to include addendum from OT and note from bedside nursing.  Secure email to Covid housing    Response received that they will follow up with [facility] and see what questions they may have.    Barriers to Discharge:    Placement acceptance  Covid positive on 9-2-21  Pt homeless    Plan:    RN CM to Complete the [facility] Functional capacity screening form and return to them for review.

## 2021-09-05 NOTE — PROGRESS NOTES
Hospital Medicine Daily Progress Note    Date of Service  9/5/2021    Chief Complaint  Kayleen Alfredo is a 66 y.o. female admitted 8/28/2021 with intractable back pain    Hospital Course  Patient is a 66-year-old female with no medical history of smoking.  Patient was recently admitted to the hospital 8/22-8/23/21, for similar complaints of intractable back pain.  Patient presented on 8/28/2021 with complaints of intractable back pain to the lumbar region that has been going on for approximately 1 month.  Patient called EMS and was given Tylenol by the ambulance and transported to the emergency room.  Patient left AMA on previous admission however during that admission MRI of lumbar spine demonstrated acute/subacute compression fracture of L2, subacute fracture of L4, compression fracture of L3 and focal lesions on L4 concerning for pathological fracture.  IR had previously been consulted and planned for osteocool RF ablation and biopsies however patient had left AMA.  CT chest abdomen pelvis completed in the emergency room which revealed bilateral pulmonary masses largest located in the right upper lobe measuring 2 x 2 cm possibly representing primary lung cancer with metastatic disease.  Additionally seen are enlarged right hilar and mediastinal lymph nodes consistent with metastatic adenopathy, an ill-defined mixed lytic and sclerotic bony metastasis.     Patient underwent L2 and L4 biopsy, osteocool ablation and kyphoplasty on 9/2.     9/3: Patient reports back pain 8/10, cough productive yellow sputum. Pain is controlled with prn medications. Denies SOB. Afebrile, hemodynamically stable. On room air. Ordered PT, OT evaluation. Bone biopsy L4 Positive for metastatic disease. Final diagnosis pending IHC studies to evaluate for tumor type and origin. Bone biopsy L2 negative for metastatic disease. PT, OT no further needs. Discussed in IDT rounds. Patient can be discharged to [facility] with follow up outpatient  oncology.  9/4: Afebrile, BP elevated. Started amlodipine 5 mg, continue lisinoplril. COVID housing declined the patient    Interval Problem Update  9/5: Reports back pain 8/10, controlled with prn pain medications. Denies cough, SOB. Afebrile, hemodynamically stable. On room air. Pending placement to [facility].    I have personally seen and examined the patient at bedside. I discussed the plan of care with patient, bedside RN, charge RN and .    Consultants/Specialty  palliative care and IR    Code Status  DNAR/DNI    Disposition  Patient is medically cleared.   Anticipate discharge to [facility].  I have placed the appropriate orders for post-discharge needs.    Review of Systems  Review of Systems   Constitutional: Negative for chills and fever.   HENT: Negative for hearing loss.    Eyes: Negative.    Respiratory: Negative for shortness of breath.    Cardiovascular: Negative for chest pain and leg swelling.   Gastrointestinal: Negative for abdominal pain, nausea and vomiting.   Genitourinary: Negative for dysuria.   Musculoskeletal: Positive for back pain.   Skin: Negative for rash.   Neurological: Negative for focal weakness and headaches    Physical Exam  Temp:  [35.9 °C (96.7 °F)-36.6 °C (97.8 °F)] 35.9 °C (96.7 °F)  Pulse:  [56-66] 56  Resp:  [16-18] 16  BP: (130-172)/(67-84) 136/84  SpO2:  [94 %-95 %] 95 %    Physical Exam  Vitals and nursing note reviewed.   Constitutional:       Appearance: She is ill-appearing.   HENT:      Head: Normocephalic.      Mouth/Throat:      Mouth: Mucous membranes are moist.      Pharynx: Oropharynx is clear.   Eyes:      Pupils: Pupils are equal, round, and reactive to light.   Cardiovascular:      Rate and Rhythm: Normal rate and regular rhythm.      Pulses: Normal pulses.      Heart sounds: Normal heart sounds.   Pulmonary:      Effort: Pulmonary effort is normal.      Breath sounds: Wheezing and rales present.   Abdominal:      General: Abdomen is flat.  Bowel sounds are normal.      Palpations: Abdomen is soft.      Tenderness: There is no abdominal tenderness.   Musculoskeletal:         General: Normal range of motion.      Cervical back: Normal range of motion.   Skin:     General: Skin is warm.   Neurological:      General: No focal deficit present.      Mental Status: She is alert and oriented to person, place, and time.     Fluids    Intake/Output Summary (Last 24 hours) at 9/5/2021 1141  Last data filed at 9/4/2021 1400  Gross per 24 hour   Intake 120 ml   Output --   Net 120 ml       Laboratory                        Imaging  MR-BRAIN-WITH & W/O   Final Result      1.  No evidence of acute territorial infarct, intracranial hemorrhage or mass lesion.   2.  Mild microangiopathic ischemic change versus demyelination or gliosis.   3.  Pansinusitis.   4.  Bilateral mastoid effusions.      IR-KYPHOPLASTY,1 VERTEBRA,LUMBAR    (Results Pending)        Assessment/Plan  * Lumbar compression fracture (HCC)- (present on admission)  Assessment & Plan  MR lumbar spine from 8/23/2001 showed acute/subacute compression fracture of L2, L4, and L3, an approximately 10 mm peripherally enhancing lesion in the L4 vertebral body, focal T2 hyper intense lesions in the iliac bones concerning for osseous lesions with pathological fractures. IR were previously consulted for ostial cool RF ablation with biopsies, patient left AMA. Now planned for today    Pulmonary mass  Assessment & Plan  CT chest, abdomen, pelvis with contrast from 8/23/2021 showed bilateral pulmonary masses (largest measuring 2 x 2 cm) likely representing primary lung cancer with metastatic disease. She had enlarged right hilar and mediastinal lymph nodes consistent with metastatic adenopathy.    Metastatic disease (HCC)- (present on admission)  Assessment & Plan  Pulmonary mass with suspected spinal lesions. IR Osteocool RF ablation + BX on L2 and L4 with GA scheduled for today. Continue pain medications and  dexamethasone.    H/O noncompliance with medical treatment, presenting hazards to health  Assessment & Plan  Recently left AGAINST MEDICAL ADVICE.  Continue to  on importance of adhering to medical recommendations and treatment.    Elevated alkaline phosphatase level- (present on admission)  Assessment & Plan  Likely due to metastatic disease.  Monitor    Tobacco dependence- (present on admission)  Assessment & Plan  Patient received counseling on tobacco cessation on admission.  Continue to  on quitting tobacco.          VTE prophylaxis: enoxaparin ppx    I have performed a physical exam and reviewed and updated ROS and Plan today (9/5/2021). In review of yesterday's note (9/4/2021), there are no changes except as documented above.

## 2021-09-06 PROCEDURE — A9270 NON-COVERED ITEM OR SERVICE: HCPCS | Performed by: INTERNAL MEDICINE

## 2021-09-06 PROCEDURE — 700111 HCHG RX REV CODE 636 W/ 250 OVERRIDE (IP): Performed by: STUDENT IN AN ORGANIZED HEALTH CARE EDUCATION/TRAINING PROGRAM

## 2021-09-06 PROCEDURE — 700101 HCHG RX REV CODE 250: Performed by: STUDENT IN AN ORGANIZED HEALTH CARE EDUCATION/TRAINING PROGRAM

## 2021-09-06 PROCEDURE — 700102 HCHG RX REV CODE 250 W/ 637 OVERRIDE(OP): Performed by: STUDENT IN AN ORGANIZED HEALTH CARE EDUCATION/TRAINING PROGRAM

## 2021-09-06 PROCEDURE — A9270 NON-COVERED ITEM OR SERVICE: HCPCS | Performed by: STUDENT IN AN ORGANIZED HEALTH CARE EDUCATION/TRAINING PROGRAM

## 2021-09-06 PROCEDURE — 99232 SBSQ HOSP IP/OBS MODERATE 35: CPT | Performed by: STUDENT IN AN ORGANIZED HEALTH CARE EDUCATION/TRAINING PROGRAM

## 2021-09-06 PROCEDURE — 770001 HCHG ROOM/CARE - MED/SURG/GYN PRIV*

## 2021-09-06 PROCEDURE — 700102 HCHG RX REV CODE 250 W/ 637 OVERRIDE(OP): Performed by: NURSE PRACTITIONER

## 2021-09-06 PROCEDURE — A9270 NON-COVERED ITEM OR SERVICE: HCPCS | Performed by: NURSE PRACTITIONER

## 2021-09-06 PROCEDURE — 700102 HCHG RX REV CODE 250 W/ 637 OVERRIDE(OP): Performed by: INTERNAL MEDICINE

## 2021-09-06 RX ORDER — OXYCODONE AND ACETAMINOPHEN 10; 325 MG/1; MG/1
1 TABLET ORAL EVERY 4 HOURS PRN
Status: DISCONTINUED | OUTPATIENT
Start: 2021-09-06 | End: 2021-09-07

## 2021-09-06 RX ORDER — HYDROMORPHONE HYDROCHLORIDE 1 MG/ML
0.5 INJECTION, SOLUTION INTRAMUSCULAR; INTRAVENOUS; SUBCUTANEOUS EVERY 4 HOURS PRN
Status: DISCONTINUED | OUTPATIENT
Start: 2021-09-06 | End: 2021-09-07

## 2021-09-06 RX ADMIN — HYDROMORPHONE HYDROCHLORIDE 0.5 MG: 1 INJECTION, SOLUTION INTRAMUSCULAR; INTRAVENOUS; SUBCUTANEOUS at 22:18

## 2021-09-06 RX ADMIN — OXYCODONE HYDROCHLORIDE AND ACETAMINOPHEN 1 TABLET: 5; 325 TABLET ORAL at 07:14

## 2021-09-06 RX ADMIN — HYDROMORPHONE HYDROCHLORIDE 0.5 MG: 1 INJECTION, SOLUTION INTRAMUSCULAR; INTRAVENOUS; SUBCUTANEOUS at 18:10

## 2021-09-06 RX ADMIN — LIDOCAINE 1 PATCH: 50 PATCH TOPICAL at 16:01

## 2021-09-06 RX ADMIN — DOCUSATE SODIUM 50 MG AND SENNOSIDES 8.6 MG 2 TABLET: 8.6; 5 TABLET, FILM COATED ORAL at 04:04

## 2021-09-06 RX ADMIN — AMLODIPINE BESYLATE 5 MG: 5 TABLET ORAL at 04:03

## 2021-09-06 RX ADMIN — NICOTINE TRANSDERMAL SYSTEM 21 MG: 21 PATCH, EXTENDED RELEASE TRANSDERMAL at 04:04

## 2021-09-06 RX ADMIN — DEXAMETHASONE 4 MG: 4 TABLET ORAL at 16:14

## 2021-09-06 RX ADMIN — OXYCODONE AND ACETAMINOPHEN 1 TABLET: 10; 325 TABLET ORAL at 16:01

## 2021-09-06 RX ADMIN — HYDROMORPHONE HYDROCHLORIDE 0.5 MG: 1 INJECTION, SOLUTION INTRAMUSCULAR; INTRAVENOUS; SUBCUTANEOUS at 09:16

## 2021-09-06 RX ADMIN — DOCUSATE SODIUM 50 MG AND SENNOSIDES 8.6 MG 2 TABLET: 8.6; 5 TABLET, FILM COATED ORAL at 16:14

## 2021-09-06 RX ADMIN — HYDROMORPHONE HYDROCHLORIDE 0.5 MG: 1 INJECTION, SOLUTION INTRAMUSCULAR; INTRAVENOUS; SUBCUTANEOUS at 13:42

## 2021-09-06 RX ADMIN — LISINOPRIL 10 MG: 10 TABLET ORAL at 06:31

## 2021-09-06 RX ADMIN — DEXAMETHASONE 4 MG: 4 TABLET ORAL at 04:04

## 2021-09-06 RX ADMIN — OXYCODONE AND ACETAMINOPHEN 1 TABLET: 10; 325 TABLET ORAL at 19:59

## 2021-09-06 RX ADMIN — HYDROMORPHONE HYDROCHLORIDE 0.5 MG: 1 INJECTION, SOLUTION INTRAMUSCULAR; INTRAVENOUS; SUBCUTANEOUS at 04:04

## 2021-09-06 ASSESSMENT — PAIN DESCRIPTION - PAIN TYPE
TYPE: ACUTE PAIN
TYPE: ACUTE PAIN
TYPE: ACUTE PAIN;SURGICAL PAIN
TYPE: ACUTE PAIN
TYPE: ACUTE PAIN;SURGICAL PAIN
TYPE: ACUTE PAIN
TYPE: ACUTE PAIN;SURGICAL PAIN
TYPE: ACUTE PAIN
TYPE: ACUTE PAIN

## 2021-09-06 NOTE — CARE PLAN
The patient is Stable - Low risk of patient condition declining or worsening    Shift Goals  Clinical Goals: educate on pain medication  Patient Goals: pain control  Family Goals: MARCUS    Progress made toward(s) clinical / shift goals:  See note below.    Patient is not progressing towards the following goals:      Problem: Pain - Standard  Goal: Alleviation of pain or a reduction in pain to the patient’s comfort goal  Outcome: Not Progressing  Note: Collaborating with MD and pt to identify best way to get pts pain under control. Administering medication per MAR and encouraging nonpharmacologic interventions like ambulation, ice and heat.

## 2021-09-06 NOTE — CARE PLAN
The patient is Stable - Low risk of patient condition declining or worsening    Shift Goals  Clinical Goals: pain management  Patient Goals: pain management  Family Goals: MARCUS    Progress made toward(s) clinical / shift goals:  Collaborated with pt, MD, and pharmacy about adjusting pts pain medication.     Patient is not progressing towards the following goals:      Problem: Pain - Standard  Goal: Alleviation of pain or a reduction in pain to the patient’s comfort goal  Outcome: Not Progressing

## 2021-09-06 NOTE — PROGRESS NOTES
Hospital Medicine Daily Progress Note    Date of Service  9/6/2021    Chief Complaint  Kayleen Alfredo is a 66 y.o. female admitted 8/28/2021 with intractable back pain    Hospital Course  Patient is a 66-year-old female with no medical history of smoking.  Patient was recently admitted to the hospital 8/22-8/23/21, for similar complaints of intractable back pain.  Patient presented on 8/28/2021 with complaints of intractable back pain to the lumbar region that has been going on for approximately 1 month.  Patient called EMS and was given Tylenol by the ambulance and transported to the emergency room.  Patient left AMA on previous admission however during that admission MRI of lumbar spine demonstrated acute/subacute compression fracture of L2, subacute fracture of L4, compression fracture of L3 and focal lesions on L4 concerning for pathological fracture.  IR had previously been consulted and planned for osteocool RF ablation and biopsies however patient had left AMA.  CT chest abdomen pelvis completed in the emergency room which revealed bilateral pulmonary masses largest located in the right upper lobe measuring 2 x 2 cm possibly representing primary lung cancer with metastatic disease.  Additionally seen are enlarged right hilar and mediastinal lymph nodes consistent with metastatic adenopathy, an ill-defined mixed lytic and sclerotic bony metastasis.     Patient underwent L2 and L4 biopsy, osteocool ablation and kyphoplasty on 9/2.     9/3: Patient reports back pain 8/10, cough productive yellow sputum. Pain is controlled with prn medications. Denies SOB. Afebrile, hemodynamically stable. On room air. Ordered PT, OT evaluation. Bone biopsy L4 Positive for metastatic disease. Final diagnosis pending IHC studies to evaluate for tumor type and origin. Bone biopsy L2 negative for metastatic disease. PT, OT no further needs. Discussed in IDT rounds. Patient can be discharged to [facility] with follow up outpatient  oncology.  9/4: Afebrile, BP elevated. Started amlodipine 5 mg, continue lisinoplril. COVID [facility] declined the patient  9/5: Reports back pain 8/10, controlled with prn pain medications. Denies cough, SOB. Afebrile, hemodynamically stable. On room air.      Interval Problem Update  9/6: Patient is still reporting lower back pain, very agitated that not getting IV dilaudid. Patient is being transitioned to PO pain medications. Afebrile, hemodynamically stable. On room air. Pending placement to [facility].    I have personally seen and examined the patient at bedside. I discussed the plan of care with patient, bedside RN, charge RN and .    Consultants/Specialty  palliative care and IR    Code Status  DNAR/DNI    Disposition  Patient is medically cleared.   Anticipate discharge to [facility].  I have placed the appropriate orders for post-discharge needs.    Review of Systems  Review of Systems   Constitutional: Negative for chills and fever.   HENT: Negative for hearing loss.    Eyes: Negative.    Respiratory: Negative for shortness of breath.    Cardiovascular: Negative for chest pain and leg swelling.   Gastrointestinal: Negative for abdominal pain, nausea and vomiting.   Genitourinary: Negative for dysuria.   Musculoskeletal: Positive for back pain.   Skin: Negative for rash.   Neurological: Negative for focal weakness and headaches    Physical Exam  Temp:  [36.1 °C (96.9 °F)-36.7 °C (98 °F)] 36.2 °C (97.1 °F)  Pulse:  [57-69] 65  Resp:  [16-18] 18  BP: (120-155)/(61-86) 127/71  SpO2:  [95 %-98 %] 95 %    Physical Exam  Vitals and nursing note reviewed.   Constitutional:       Appearance: She is ill-appearing.   HENT:      Head: Normocephalic.      Mouth/Throat:      Mouth: Mucous membranes are moist.      Pharynx: Oropharynx is clear.   Eyes:      Pupils: Pupils are equal, round, and reactive to light.   Cardiovascular:      Rate and Rhythm: Normal rate and regular rhythm.      Pulses: Normal  pulses.      Heart sounds: Normal heart sounds.   Pulmonary:      Effort: Pulmonary effort is normal.      Breath sounds: Wheezing and rales present.   Abdominal:      General: Abdomen is flat. Bowel sounds are normal.      Palpations: Abdomen is soft.      Tenderness: There is no abdominal tenderness.   Musculoskeletal:         General: Normal range of motion.      Cervical back: Normal range of motion.   Skin:     General: Skin is warm.   Neurological:      General: No focal deficit present.      Mental Status: She is alert and oriented to person, place, and time.     Fluids  No intake or output data in the 24 hours ending 09/06/21 1244    Laboratory    Imaging  MR-BRAIN-WITH & W/O   Final Result      1.  No evidence of acute territorial infarct, intracranial hemorrhage or mass lesion.   2.  Mild microangiopathic ischemic change versus demyelination or gliosis.   3.  Pansinusitis.   4.  Bilateral mastoid effusions.      IR-KYPHOPLASTY,1 VERTEBRA,LUMBAR    (Results Pending)        Assessment/Plan  * Lumbar compression fracture (HCC)- (present on admission)  Assessment & Plan  MR lumbar spine from 8/23/2001 showed acute/subacute compression fracture of L2, L4, and L3, an approximately 10 mm peripherally enhancing lesion in the L4 vertebral body, focal T2 hyper intense lesions in the iliac bones concerning for osseous lesions with pathological fractures. IR were previously consulted for ostial cool RF ablation with biopsies, patient left AMA. Now planned for today    Pulmonary mass  Assessment & Plan  CT chest, abdomen, pelvis with contrast from 8/23/2021 showed bilateral pulmonary masses (largest measuring 2 x 2 cm) likely representing primary lung cancer with metastatic disease. She had enlarged right hilar and mediastinal lymph nodes consistent with metastatic adenopathy.    Metastatic disease (HCC)- (present on admission)  Assessment & Plan  Pulmonary mass with suspected spinal lesions. IR Osteocool RF ablation +  BX on L2 and L4 with GA scheduled for today. Continue pain medications and dexamethasone.    H/O noncompliance with medical treatment, presenting hazards to health  Assessment & Plan  Recently left AGAINST MEDICAL ADVICE.  Continue to  on importance of adhering to medical recommendations and treatment.    Elevated alkaline phosphatase level- (present on admission)  Assessment & Plan  Likely due to metastatic disease.  Monitor    Tobacco dependence- (present on admission)  Assessment & Plan  Patient received counseling on tobacco cessation on admission.  Continue to  on quitting tobacco.        VTE prophylaxis: enoxaparin ppx    I have performed a physical exam and reviewed and updated ROS and Plan today (9/6/2021). In review of yesterday's note (9/5/2021), there are no changes except as documented above.

## 2021-09-06 NOTE — CARE PLAN
The patient is Stable - Low risk of patient condition declining or worsening    Shift Goals  Clinical Goals: pain management  Patient Goals: rest  Family Goals: MARCUS    Progress made toward(s) clinical / shift goals:  Pharmacological intervention in place for pain management.     Patient is not progressing towards the following goals:

## 2021-09-06 NOTE — PROGRESS NOTES
"Pt requesting IV Dilaudid. Educated pt on orders to administer oral pain medications prior to IV. Pt became agitated stating that \"the night nurse already gave me a pill so I should get IV now.\" Pt reminded that this order of administration needs to take place every time and that percocet is due now. Also educated pt on the effects of pain medication and that she cannot go home on IV pain meds.   "

## 2021-09-06 NOTE — DISCHARGE PLANNING
Anticipated Discharge Disposition:   TBD    Action:   RN SATHYA called [facility], spoke to [employer]. Per [employer], they reviewed and escalated but they are still unable to accept pt due to risk of falls and  concerns about pt safety. Charge RN updated.    Barriers to Discharge:   COVID Housing unable to accept.  COVID positive.  Homeless.    Plan:   Hospital Care Management will continue to follow and assist with discharge planning needs.

## 2021-09-07 PROBLEM — U07.1 COVID-19: Status: ACTIVE | Noted: 2021-09-07

## 2021-09-07 PROCEDURE — 99232 SBSQ HOSP IP/OBS MODERATE 35: CPT | Performed by: INTERNAL MEDICINE

## 2021-09-07 PROCEDURE — A9270 NON-COVERED ITEM OR SERVICE: HCPCS | Performed by: NURSE PRACTITIONER

## 2021-09-07 PROCEDURE — 700101 HCHG RX REV CODE 250: Performed by: STUDENT IN AN ORGANIZED HEALTH CARE EDUCATION/TRAINING PROGRAM

## 2021-09-07 PROCEDURE — 700102 HCHG RX REV CODE 250 W/ 637 OVERRIDE(OP): Performed by: STUDENT IN AN ORGANIZED HEALTH CARE EDUCATION/TRAINING PROGRAM

## 2021-09-07 PROCEDURE — U0005 INFEC AGEN DETEC AMPLI PROBE: HCPCS

## 2021-09-07 PROCEDURE — A9270 NON-COVERED ITEM OR SERVICE: HCPCS | Performed by: INTERNAL MEDICINE

## 2021-09-07 PROCEDURE — 700102 HCHG RX REV CODE 250 W/ 637 OVERRIDE(OP): Performed by: INTERNAL MEDICINE

## 2021-09-07 PROCEDURE — 770001 HCHG ROOM/CARE - MED/SURG/GYN PRIV*

## 2021-09-07 PROCEDURE — 700111 HCHG RX REV CODE 636 W/ 250 OVERRIDE (IP): Performed by: INTERNAL MEDICINE

## 2021-09-07 PROCEDURE — 700111 HCHG RX REV CODE 636 W/ 250 OVERRIDE (IP): Performed by: STUDENT IN AN ORGANIZED HEALTH CARE EDUCATION/TRAINING PROGRAM

## 2021-09-07 PROCEDURE — A9270 NON-COVERED ITEM OR SERVICE: HCPCS | Performed by: STUDENT IN AN ORGANIZED HEALTH CARE EDUCATION/TRAINING PROGRAM

## 2021-09-07 PROCEDURE — U0003 INFECTIOUS AGENT DETECTION BY NUCLEIC ACID (DNA OR RNA); SEVERE ACUTE RESPIRATORY SYNDROME CORONAVIRUS 2 (SARS-COV-2) (CORONAVIRUS DISEASE [COVID-19]), AMPLIFIED PROBE TECHNIQUE, MAKING USE OF HIGH THROUGHPUT TECHNOLOGIES AS DESCRIBED BY CMS-2020-01-R: HCPCS

## 2021-09-07 PROCEDURE — 700102 HCHG RX REV CODE 250 W/ 637 OVERRIDE(OP): Performed by: NURSE PRACTITIONER

## 2021-09-07 RX ORDER — OXYCODONE AND ACETAMINOPHEN 10; 325 MG/1; MG/1
1 TABLET ORAL EVERY 4 HOURS PRN
Status: DISCONTINUED | OUTPATIENT
Start: 2021-09-07 | End: 2021-09-11 | Stop reason: HOSPADM

## 2021-09-07 RX ORDER — HYDROMORPHONE HYDROCHLORIDE 2 MG/1
2 TABLET ORAL EVERY 6 HOURS PRN
Status: DISCONTINUED | OUTPATIENT
Start: 2021-09-07 | End: 2021-09-11 | Stop reason: HOSPADM

## 2021-09-07 RX ORDER — DEXAMETHASONE 6 MG/1
6 TABLET ORAL DAILY
Status: DISCONTINUED | OUTPATIENT
Start: 2021-09-08 | End: 2021-09-09

## 2021-09-07 RX ORDER — MORPHINE SULFATE 15 MG/1
15 TABLET, FILM COATED, EXTENDED RELEASE ORAL EVERY 12 HOURS
Status: DISCONTINUED | OUTPATIENT
Start: 2021-09-07 | End: 2021-09-08

## 2021-09-07 RX ADMIN — DEXAMETHASONE 4 MG: 4 TABLET ORAL at 06:27

## 2021-09-07 RX ADMIN — ENOXAPARIN SODIUM 40 MG: 40 INJECTION SUBCUTANEOUS at 06:27

## 2021-09-07 RX ADMIN — LIDOCAINE 1 PATCH: 50 PATCH TOPICAL at 15:26

## 2021-09-07 RX ADMIN — HYDROMORPHONE HYDROCHLORIDE 0.5 MG: 1 INJECTION, SOLUTION INTRAMUSCULAR; INTRAVENOUS; SUBCUTANEOUS at 10:55

## 2021-09-07 RX ADMIN — MORPHINE SULFATE 15 MG: 15 TABLET, FILM COATED, EXTENDED RELEASE ORAL at 17:27

## 2021-09-07 RX ADMIN — OXYCODONE AND ACETAMINOPHEN 1 TABLET: 10; 325 TABLET ORAL at 14:49

## 2021-09-07 RX ADMIN — HYDROMORPHONE HYDROCHLORIDE 0.5 MG: 1 INJECTION, SOLUTION INTRAMUSCULAR; INTRAVENOUS; SUBCUTANEOUS at 06:33

## 2021-09-07 RX ADMIN — LISINOPRIL 10 MG: 10 TABLET ORAL at 06:27

## 2021-09-07 RX ADMIN — OXYCODONE AND ACETAMINOPHEN 1 TABLET: 10; 325 TABLET ORAL at 00:46

## 2021-09-07 RX ADMIN — NICOTINE TRANSDERMAL SYSTEM 21 MG: 21 PATCH, EXTENDED RELEASE TRANSDERMAL at 06:26

## 2021-09-07 RX ADMIN — OXYCODONE AND ACETAMINOPHEN 1 TABLET: 10; 325 TABLET ORAL at 23:51

## 2021-09-07 RX ADMIN — AMLODIPINE BESYLATE 5 MG: 5 TABLET ORAL at 06:27

## 2021-09-07 RX ADMIN — DOCUSATE SODIUM 50 MG AND SENNOSIDES 8.6 MG 2 TABLET: 8.6; 5 TABLET, FILM COATED ORAL at 16:22

## 2021-09-07 RX ADMIN — DOCUSATE SODIUM 50 MG AND SENNOSIDES 8.6 MG 2 TABLET: 8.6; 5 TABLET, FILM COATED ORAL at 06:27

## 2021-09-07 RX ADMIN — HYDROMORPHONE HYDROCHLORIDE 0.5 MG: 1 INJECTION, SOLUTION INTRAMUSCULAR; INTRAVENOUS; SUBCUTANEOUS at 15:26

## 2021-09-07 RX ADMIN — NICOTINE POLACRILEX 2 MG: 2 GUM, CHEWING BUCCAL at 18:27

## 2021-09-07 RX ADMIN — ONDANSETRON 4 MG: 4 TABLET, ORALLY DISINTEGRATING ORAL at 20:23

## 2021-09-07 RX ADMIN — OXYCODONE AND ACETAMINOPHEN 1 TABLET: 10; 325 TABLET ORAL at 09:49

## 2021-09-07 ASSESSMENT — ENCOUNTER SYMPTOMS
BACK PAIN: 1
BRUISES/BLEEDS EASILY: 0
ORTHOPNEA: 0
SENSORY CHANGE: 0
EYE DISCHARGE: 0
HEARTBURN: 0
FEVER: 0
DOUBLE VISION: 0
CLAUDICATION: 0
MYALGIAS: 0
BLOOD IN STOOL: 0
SPEECH CHANGE: 0
SPUTUM PRODUCTION: 0
NAUSEA: 0
DIZZINESS: 0
PHOTOPHOBIA: 0
WEAKNESS: 0
CHILLS: 0
VOMITING: 0
SORE THROAT: 0
SHORTNESS OF BREATH: 0
PALPITATIONS: 0
HEADACHES: 0
DIARRHEA: 0
DEPRESSION: 0
WHEEZING: 0
COUGH: 0
HEMOPTYSIS: 0
FLANK PAIN: 0
BLURRED VISION: 0
ABDOMINAL PAIN: 0

## 2021-09-07 ASSESSMENT — PAIN DESCRIPTION - PAIN TYPE
TYPE: ACUTE PAIN
TYPE: ACUTE PAIN;SURGICAL PAIN
TYPE: ACUTE PAIN
TYPE: ACUTE PAIN;SURGICAL PAIN
TYPE: ACUTE PAIN

## 2021-09-07 NOTE — CARE PLAN
The patient is Stable - Low risk of patient condition declining or worsening    Shift Goals  Clinical Goals: educate on pain medication  Patient Goals: pain control  Family Goals: MARCUS    Progress made toward(s) clinical / shift goals:  Patient medicated for pain upon request, in accordance with orders.    Patient is not progressing towards the following goals:      Problem: Pain - Standard  Goal: Alleviation of pain or a reduction in pain to the patient’s comfort goal  Outcome: Not Progressing   Patient demanding pain medications more frequently than ordered, but has no trouble falling back asleep when told she is unable to have pain meds at the moment.   Problem: Knowledge Deficit - Standard  Goal: Patient and family/care givers will demonstrate understanding of plan of care, disease process/condition, diagnostic tests and medications  Outcome: Not Progressing   Patient does not understand need for assessments and yells at staff frequently during vitals/assessments.

## 2021-09-07 NOTE — ASSESSMENT & PLAN NOTE
First positive test on 9/2/2021  Patient currently asymptomatic and on room air  Pending repeat test prior to discharge back to shelter

## 2021-09-07 NOTE — THERAPY
Physical Therapy Contact Note    Patient Name: Kayleen Alfredo  Age:  66 y.o., Sex:  female  Medical Record #: [id number]  Today's Date: 9/7/2021    Acknowledge new PT order, however this patient was evaluated on 9/3 and found to be at her baseline. She does not have a bed alarm and is up self in the room which means she is no longer a fall risk. She does not need ongoing PT and is safe to discharge from the hospital from a PT standpoint.    Sarita Yap, PT  [id number]

## 2021-09-07 NOTE — PROGRESS NOTES
Hospital Medicine Daily Progress Note    Date of Service  9/7/2021    Chief Complaint  Kayleen Alfredo is a 66 y.o. female admitted 8/28/2021 with COVID and pathologic spine fracture     Hospital Course  Patient is a 66-year-old female with no medical history of smoking.  Patient was recently admitted to the hospital 8/22-8/23/21, for similar complaints of intractable back pain.  Patient presented on 8/28/2021 with complaints of intractable back pain to the lumbar region that has been going on for approximately 1 month.  Patient called EMS and was given Tylenol by the ambulance and transported to the emergency room.  Patient left AMA on previous admission however during that admission MRI of lumbar spine demonstrated acute/subacute compression fracture of L2, subacute fracture of L4, compression fracture of L3 and focal lesions on L4 concerning for pathological fracture.  IR had previously been consulted and planned for osteocool RF ablation and biopsies however patient had left AMA.  CT chest abdomen pelvis completed in the emergency room which revealed bilateral pulmonary masses largest located in the right upper lobe measuring 2 x 2 cm possibly representing primary lung cancer with metastatic disease.  Additionally seen are enlarged right hilar and mediastinal lymph nodes consistent with metastatic adenopathy, an ill-defined mixed lytic and sclerotic bony metastasis.       Interval Problem Update      9/7/2021: The patient was admitted for further evaluation and management of back pain.  MRI of the lumbar spine shows evidence of compression fracture associated with lesions concerning for metastasis.  She underwent bone biopsy with IR, current pathology shows findings consistent with metastatic lung adenocarcinoma.  CT of the chest does show pulmonary masses.  Oncology has been consulted and would appreciate their recommendations.  At this time patient is able to ambulate independently but still has persistent back  pain.  She denies any bowel/bladder incontinence, no radiculopathy noted, no saddle anesthesia noted.  She continues to tolerate oral intake and denies any chest pains, palpitations, or worsening shortness of breath.  Pending repeat Covid test prior to discharge.  No other overnight events reported.    I have personally seen and examined the patient at bedside. I discussed the plan of care with patient.    Consultants/Specialty  oncology   IR    Code Status  DNAR/DNI    Disposition  Patient is not medically cleared.   Anticipate discharge to to home with close outpatient follow-up.  I have placed the appropriate orders for post-discharge needs.    Review of Systems  Review of Systems   Constitutional: Negative for chills, fever and malaise/fatigue.   HENT: Negative for congestion, hearing loss, sore throat and tinnitus.    Eyes: Negative for blurred vision, double vision, photophobia and discharge.   Respiratory: Negative for cough, hemoptysis, sputum production, shortness of breath and wheezing.    Cardiovascular: Negative for chest pain, palpitations, orthopnea, claudication and leg swelling.   Gastrointestinal: Negative for abdominal pain, blood in stool, diarrhea, heartburn, melena, nausea and vomiting.   Genitourinary: Negative for dysuria, flank pain, hematuria and urgency.   Musculoskeletal: Positive for back pain. Negative for joint pain and myalgias.   Skin: Negative for itching and rash.   Neurological: Negative for dizziness, sensory change, speech change, weakness and headaches.   Endo/Heme/Allergies: Does not bruise/bleed easily.   Psychiatric/Behavioral: Negative for depression and suicidal ideas.        Physical Exam  Temp:  [36 °C (96.8 °F)-37.1 °C (98.7 °F)] 37.1 °C (98.7 °F)  Pulse:  [58-69] 69  Resp:  [16-18] 16  BP: (117-140)/(61-74) 117/72  SpO2:  [94 %-96 %] 94 %    Physical Exam  Vitals and nursing note reviewed.   Constitutional:       General: She is not in acute distress.     Appearance:  Normal appearance. She is obese.   HENT:      Head: Normocephalic and atraumatic.      Mouth/Throat:      Mouth: Mucous membranes are moist.   Eyes:      Extraocular Movements: Extraocular movements intact.      Conjunctiva/sclera: Conjunctivae normal.      Pupils: Pupils are equal, round, and reactive to light.   Cardiovascular:      Rate and Rhythm: Normal rate and regular rhythm.      Pulses: Normal pulses.      Heart sounds: Normal heart sounds. No murmur heard.   No friction rub.   Pulmonary:      Effort: Pulmonary effort is normal. No respiratory distress.      Breath sounds: Normal breath sounds. No wheezing.   Abdominal:      General: Abdomen is flat. Bowel sounds are normal.      Palpations: Abdomen is soft.      Tenderness: There is no abdominal tenderness. There is no guarding.   Musculoskeletal:         General: No swelling or tenderness. Normal range of motion.      Cervical back: Normal range of motion and neck supple.      Comments: Lumbar tenderness    Skin:     General: Skin is warm and dry.      Findings: No rash.   Neurological:      General: No focal deficit present.      Mental Status: She is alert and oriented to person, place, and time.      Cranial Nerves: No cranial nerve deficit.      Motor: No weakness.   Psychiatric:         Mood and Affect: Mood normal.         Behavior: Behavior normal.         Fluids    Intake/Output Summary (Last 24 hours) at 9/7/2021 1636  Last data filed at 9/7/2021 0947  Gross per 24 hour   Intake 720 ml   Output --   Net 720 ml       Laboratory                        Imaging  IR-KYPHOPLASTY,1 VERTEBRA,LUMBAR   Final Result      1. Thoracic Deep Bone Biopsy, Osteocool Radiofrequency Ablation, And Kyphoplasty At L2 With Biplane Fluoroscopic Guidance For A Pathologic Metastatic Fracture.      2. Lumbar Deep Bone Biopsy, Osteocool Radiofrequency Ablation, And Kyphoplasty At L4 With Biplane Fluoroscopic Guidance For A Pathologic Metastatic Fracture.      3. Clinical Or  Telephone Followup Is Scheduled For One Month, 3 Months, And 6 Months Post Procedure.      MR-BRAIN-WITH & W/O   Final Result      1.  No evidence of acute territorial infarct, intracranial hemorrhage or mass lesion.   2.  Mild microangiopathic ischemic change versus demyelination or gliosis.   3.  Pansinusitis.   4.  Bilateral mastoid effusions.           Assessment/Plan  * Lumbar compression fracture (HCC)- (present on admission)  Assessment & Plan  MR lumbar spine from 8/23/2001 showed acute/subacute compression fracture of L2, L4, and L3, an approximately 10 mm peripherally enhancing lesion in the L4 vertebral body, focal T2 hyper intense lesions in the iliac bones concerning for osseous lesions with pathological fractures. IR were previously consulted for ostial cool RF ablation with biopsies, patient left AMA. Now planned for today    COVID-19  Assessment & Plan  First positive test on 9/2/2021  Patient currently asymptomatic and on room air  Pending repeat test prior to discharge back to shelter     H/O noncompliance with medical treatment, presenting hazards to health  Assessment & Plan  Recently left AGAINST MEDICAL ADVICE.  Continue to  on importance of adhering to medical recommendations and treatment.    Pulmonary mass  Assessment & Plan  CT chest, abdomen, pelvis with contrast from 8/23/2021 showed bilateral pulmonary masses (largest measuring 2 x 2 cm) likely representing primary lung cancer with metastatic disease. She had enlarged right hilar and mediastinal lymph nodes consistent with metastatic adenopathy.    Elevated alkaline phosphatase level- (present on admission)  Assessment & Plan  Likely due to metastatic disease.  Monitor    Tobacco dependence- (present on admission)  Assessment & Plan  Patient received counseling on tobacco cessation on admission.  Continue to  on quitting tobacco.     Metastatic disease (HCC)- (present on admission)  Assessment & Plan  Pulmonary mass with  suspected spinal lesions. IR Osteocool RF ablation + BX on L2 and L4 with GA scheduled for today. Continue pain medications and dexamethasone.  Pathology consistent with malignancy  Pending final oncology recommendations          VTE prophylaxis: enoxaparin ppx    I have performed a physical exam and reviewed and updated ROS and Plan today (9/7/2021). In review of yesterday's note (9/6/2021), there are no changes except as documented above.

## 2021-09-07 NOTE — DISCHARGE PLANNING
Anticipated Discharge Disposition:   TBD    Action:    Pt is receiving IV dilaudid every 4 to 6 hours routinely.  Voalte msg to Dr. Dhillon.  Response was obtained. Pt to continue on IV dialudid as ordered.  Pt to be seen by oncology tomorrow and patient not medically clear.    Barriers to Discharge:    Medical clearance    Plan:    Collaborate with patient and medical team.

## 2021-09-08 LAB
ANION GAP SERPL CALC-SCNC: 13 MMOL/L (ref 7–16)
BUN SERPL-MCNC: 19 MG/DL (ref 8–22)
CALCIUM SERPL-MCNC: 8.8 MG/DL (ref 8.5–10.5)
CHLORIDE SERPL-SCNC: 104 MMOL/L (ref 96–112)
CO2 SERPL-SCNC: 20 MMOL/L (ref 20–33)
CREAT SERPL-MCNC: 0.58 MG/DL (ref 0.5–1.4)
ERYTHROCYTE [DISTWIDTH] IN BLOOD BY AUTOMATED COUNT: 50.5 FL (ref 35.9–50)
GLUCOSE SERPL-MCNC: 106 MG/DL (ref 65–99)
HCT VFR BLD AUTO: 44.9 % (ref 37–47)
HGB BLD-MCNC: 14.3 G/DL (ref 12–16)
MAGNESIUM SERPL-MCNC: 2.1 MG/DL (ref 1.5–2.5)
MCH RBC QN AUTO: 30.2 PG (ref 27–33)
MCHC RBC AUTO-ENTMCNC: 31.8 G/DL (ref 33.6–35)
MCV RBC AUTO: 94.7 FL (ref 81.4–97.8)
PHOSPHATE SERPL-MCNC: 3.5 MG/DL (ref 2.5–4.5)
PLATELET # BLD AUTO: 308 K/UL (ref 164–446)
PLATELET # BLD AUTO: 349 K/UL (ref 164–446)
PMV BLD AUTO: 9.2 FL (ref 9–12.9)
POTASSIUM SERPL-SCNC: 4.2 MMOL/L (ref 3.6–5.5)
RBC # BLD AUTO: 4.74 M/UL (ref 4.2–5.4)
SARS-COV-2 RNA RESP QL NAA+PROBE: DETECTED
SODIUM SERPL-SCNC: 137 MMOL/L (ref 135–145)
SPECIMEN SOURCE: ABNORMAL
WBC # BLD AUTO: 12.7 K/UL (ref 4.8–10.8)

## 2021-09-08 PROCEDURE — 84100 ASSAY OF PHOSPHORUS: CPT

## 2021-09-08 PROCEDURE — 770001 HCHG ROOM/CARE - MED/SURG/GYN PRIV*

## 2021-09-08 PROCEDURE — 83883 ASSAY NEPHELOMETRY NOT SPEC: CPT

## 2021-09-08 PROCEDURE — A9270 NON-COVERED ITEM OR SERVICE: HCPCS | Performed by: STUDENT IN AN ORGANIZED HEALTH CARE EDUCATION/TRAINING PROGRAM

## 2021-09-08 PROCEDURE — 85049 AUTOMATED PLATELET COUNT: CPT

## 2021-09-08 PROCEDURE — 99232 SBSQ HOSP IP/OBS MODERATE 35: CPT | Performed by: INTERNAL MEDICINE

## 2021-09-08 PROCEDURE — 82977 ASSAY OF GGT: CPT

## 2021-09-08 PROCEDURE — 84450 TRANSFERASE (AST) (SGOT): CPT

## 2021-09-08 PROCEDURE — 700102 HCHG RX REV CODE 250 W/ 637 OVERRIDE(OP): Performed by: INTERNAL MEDICINE

## 2021-09-08 PROCEDURE — 36415 COLL VENOUS BLD VENIPUNCTURE: CPT

## 2021-09-08 PROCEDURE — 83735 ASSAY OF MAGNESIUM: CPT

## 2021-09-08 PROCEDURE — 700111 HCHG RX REV CODE 636 W/ 250 OVERRIDE (IP): Performed by: INTERNAL MEDICINE

## 2021-09-08 PROCEDURE — 84460 ALANINE AMINO (ALT) (SGPT): CPT

## 2021-09-08 PROCEDURE — 700102 HCHG RX REV CODE 250 W/ 637 OVERRIDE(OP): Performed by: STUDENT IN AN ORGANIZED HEALTH CARE EDUCATION/TRAINING PROGRAM

## 2021-09-08 PROCEDURE — A9270 NON-COVERED ITEM OR SERVICE: HCPCS | Performed by: INTERNAL MEDICINE

## 2021-09-08 PROCEDURE — 80048 BASIC METABOLIC PNL TOTAL CA: CPT

## 2021-09-08 PROCEDURE — 85027 COMPLETE CBC AUTOMATED: CPT

## 2021-09-08 PROCEDURE — 700101 HCHG RX REV CODE 250: Performed by: STUDENT IN AN ORGANIZED HEALTH CARE EDUCATION/TRAINING PROGRAM

## 2021-09-08 PROCEDURE — 84520 ASSAY OF UREA NITROGEN: CPT

## 2021-09-08 RX ORDER — MORPHINE SULFATE 15 MG/1
30 TABLET, FILM COATED, EXTENDED RELEASE ORAL EVERY 12 HOURS
Status: DISCONTINUED | OUTPATIENT
Start: 2021-09-08 | End: 2021-09-11 | Stop reason: HOSPADM

## 2021-09-08 RX ADMIN — OXYCODONE AND ACETAMINOPHEN 1 TABLET: 10; 325 TABLET ORAL at 21:29

## 2021-09-08 RX ADMIN — AMLODIPINE BESYLATE 5 MG: 5 TABLET ORAL at 05:05

## 2021-09-08 RX ADMIN — DOCUSATE SODIUM 50 MG AND SENNOSIDES 8.6 MG 2 TABLET: 8.6; 5 TABLET, FILM COATED ORAL at 05:05

## 2021-09-08 RX ADMIN — OXYCODONE AND ACETAMINOPHEN 1 TABLET: 10; 325 TABLET ORAL at 09:11

## 2021-09-08 RX ADMIN — NICOTINE POLACRILEX 2 MG: 2 GUM, CHEWING BUCCAL at 03:13

## 2021-09-08 RX ADMIN — OXYCODONE AND ACETAMINOPHEN 1 TABLET: 10; 325 TABLET ORAL at 05:04

## 2021-09-08 RX ADMIN — LISINOPRIL 10 MG: 10 TABLET ORAL at 05:05

## 2021-09-08 RX ADMIN — DOCUSATE SODIUM 50 MG AND SENNOSIDES 8.6 MG 2 TABLET: 8.6; 5 TABLET, FILM COATED ORAL at 17:24

## 2021-09-08 RX ADMIN — NICOTINE TRANSDERMAL SYSTEM 21 MG: 21 PATCH, EXTENDED RELEASE TRANSDERMAL at 05:04

## 2021-09-08 RX ADMIN — MORPHINE SULFATE 15 MG: 15 TABLET, FILM COATED, EXTENDED RELEASE ORAL at 05:05

## 2021-09-08 RX ADMIN — ENOXAPARIN SODIUM 40 MG: 40 INJECTION SUBCUTANEOUS at 05:04

## 2021-09-08 RX ADMIN — DEXAMETHASONE 6 MG: 6 TABLET ORAL at 05:04

## 2021-09-08 RX ADMIN — LIDOCAINE 1 PATCH: 50 PATCH TOPICAL at 17:23

## 2021-09-08 RX ADMIN — MORPHINE SULFATE 30 MG: 15 TABLET, FILM COATED, EXTENDED RELEASE ORAL at 17:23

## 2021-09-08 RX ADMIN — HYDROMORPHONE HYDROCHLORIDE 2 MG: 2 TABLET ORAL at 02:01

## 2021-09-08 ASSESSMENT — ENCOUNTER SYMPTOMS
HEARTBURN: 0
CHILLS: 0
BRUISES/BLEEDS EASILY: 0
SORE THROAT: 0
HEMOPTYSIS: 0
BLURRED VISION: 0
COUGH: 0
NAUSEA: 0
DEPRESSION: 0
BACK PAIN: 1
HEADACHES: 0
SPEECH CHANGE: 0
FEVER: 0
PALPITATIONS: 0
DIZZINESS: 0
MYALGIAS: 0
ABDOMINAL PAIN: 0
SENSORY CHANGE: 0
DOUBLE VISION: 0
SPUTUM PRODUCTION: 0
ORTHOPNEA: 0
VOMITING: 0

## 2021-09-08 ASSESSMENT — PAIN DESCRIPTION - PAIN TYPE
TYPE: ACUTE PAIN

## 2021-09-08 NOTE — THERAPY
Occupational Therapy  Daily Treatment     Patient Name: Kayleen Alfredo  Age:  66 y.o., Sex:  female  Medical Record #: [id number]  Today's Date: 9/8/2021     Precautions  Precautions: Fall Risk  Comments: 2/2 behaviors, COVID-19    Assessment     Pt evaluated 9/3/21 and found to have no therapy needs.  Pt is currently up self and moving independently in her room.  Pt is at her baseline and safe to return home, no other therapy needs.

## 2021-09-08 NOTE — CONSULTS
DATE OF SERVICE:  09/08/2021     REQUESTING PHYSICIAN:  Rodriguez Dhillon MD     REASON FOR CONSULTATION:  Metastatic adenocarcinoma of the lung with bony   metastasis.     HISTORY OF PRESENT ILLNESS:  The patient is a 66-year-old lady who was   admitted to [facility] with back pain, which started   sometime in July or August 2021.  On admission, MRI of the lumbar spine   revealed multiple acute, subacute compression fractures at the L2 vertebral   body, subacute fracture at L4 with compression fracture of L3 and focal   osseous lesions at L4 and T2, concerning for pathological fracture.  IR was   consulted and OsteoCool RF ablation was planned.  Also, the patient had a   biopsy, which revealed adenocarcinoma with IHC profile consistent with a lung   primary.  The patient otherwise is a smoker and continues to smoke.  There is   no evidence of any brain metastasis.  CT scan revealed bilateral pulmonary   masses, largest of which was located within the right upper lobe, measuring   2x2 cm.  Also noted were enlarged right hilar and mediastinal lymph nodes with   metastatic adenopathy.  Likely ill-defined lytic and sclerotic bony   metastasis and the fatty liver were also seen.  Due to these reasons, the   hem/onc consultation was called.  The patient was also found to have   asymptomatic COVID positivity and wishes to go home as soon as possible.  She   denied any headaches or any visual symptoms.  She denied any hemoptysis or any   weight loss.     PAST MEDICAL HISTORY:  History of smoking disorder.  Otherwise, she denies any   history of hypertension or any heart problems.     PAST SURGICAL HISTORY:  Rectal biopsy for syphilitic lesion in 2020.     FAMILY HISTORY:  She denies any history of malignancy in her family.     PERSONAL AND SOCIAL HISTORY:  She has an approximately 40-pack-year history of   smoking.  She is currently smoking about half a pack of cigarettes per day.    She denies any history of  alcohol abuse.  She also denies any history of drug   use.  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  She is currently living in a local shelter.  She is not in   touch with her family.     REVIEW OF SYSTEMS:  GENERAL AND CONSTITUTIONAL:  Denies any weight loss.  No fevers or chills.  HEAD, NECK, EAR, NOSE AND THROAT:  Denies any headaches or any visual   symptoms.  Dentition is poor  RESPIRATORY:  Has chronic cough, which has not changed recently.  Denies any   hemoptysis.  Cough is occasionally productive.  CARDIOVASCULAR:  No chest pain or palpitations.  GASTROINTESTINAL:  No nausea, vomiting or diarrhea.  No hematemesis, melena or   hematochezia.  GENITOURINARY:  Postmenopausal.  Denies any dysuria or hematuria.  NEUROLOGICAL:  Denies any seizures or stroke.  PSYCHIATRIC:  Anxious, wants to leave the hospital as soon as possible.  SKIN/INTEGUMENTARY:  No rash or any bruising.  MUSCULOSKELETAL:  Back pain is better than before.  Rest of her review of systems is negative per CMS/AMA criteria unless as   mentioned in the history of present or past illness.     PHYSICAL EXAMINATION:  VITAL SIGNS:  Reviewed, which are relatively stable.  The patient is afebrile.  HEENT:  Pupils are equal.  Dentition is poor.  NECK:  Supple with no JVD or lymphadenopathy.  LUNGS:  Wheezing in the left base.  No rales.  HEART:  Reveals no S3 or S4.  ABDOMEN:  Reveals no hepatosplenomegaly.  She is slightly obese.  EXTREMITIES:  There is no edema of lower extremities with minimal tenderness   in the lower extremities.  NEUROLOGIC:  Reveals power is equal bilaterally in both upper and lower   extremities.  PSYCHIATRIC:  Reveals anxiety, but no depression.  SKIN:  Reveals no rash or any bruising.     ASSESSMENT AND PLAN:  1.  Lung cancer.  The patient likely has a metastatic lung cancer.  I reviewed   the biopsy results with her in detail.  I explained to the patient that she   has a stage IV  malignancy and any treatment would be palliative and may extend   her life span, but is not considered curative.  Various therapies including   the various oral therapies, immunotherapy and chemotherapy, etc. were reviewed   with her.  The patient will consider taking treatment.  We will send out her   specimen for next in sequencing and hopefully will get the results back in the   next few weeks.  2.  Social problems.  The patient lives in a local shelter, although she is   not very clear which shelter she lives in.  She also does not have a phone.    She could not tell me her permanent address.  I advised her that we will need   to stay in touch with for her to follow up with us on a regular basis.  The   patient has promised to get a phone and she will inform my office once she   gets a phone. My office contact information was given to her.  The patient   verbalized understanding about these instructions.  3.  Disease to the bone.  Her dentition is poor; however, if her dentition   improves and if she is able to see a dentist, we would consider giving her   medications like zoledronic acid or Xgeva to reduce her risk of skeletal   related events.  4.  Smoker.  She has a history of smoking disorder.  She understands the   relationship between smoking and development of lung cancers.  At this time,   she is not interested in smoking cessation.  5.  COVID-19 infection.  She has active COVID-19 infection, but is relatively   asymptomatic from that.     We will continue to follow her up.        ______________________________  MD YARIEL Hanks/EDUIN    DD:  09/08/2021 14:48  DT:  09/08/2021 16:39    Job#:  [id number]

## 2021-09-09 PROCEDURE — 700111 HCHG RX REV CODE 636 W/ 250 OVERRIDE (IP): Performed by: INTERNAL MEDICINE

## 2021-09-09 PROCEDURE — 700102 HCHG RX REV CODE 250 W/ 637 OVERRIDE(OP): Performed by: INTERNAL MEDICINE

## 2021-09-09 PROCEDURE — 700101 HCHG RX REV CODE 250: Performed by: STUDENT IN AN ORGANIZED HEALTH CARE EDUCATION/TRAINING PROGRAM

## 2021-09-09 PROCEDURE — A9270 NON-COVERED ITEM OR SERVICE: HCPCS | Performed by: STUDENT IN AN ORGANIZED HEALTH CARE EDUCATION/TRAINING PROGRAM

## 2021-09-09 PROCEDURE — A9270 NON-COVERED ITEM OR SERVICE: HCPCS | Performed by: INTERNAL MEDICINE

## 2021-09-09 PROCEDURE — 700102 HCHG RX REV CODE 250 W/ 637 OVERRIDE(OP): Performed by: STUDENT IN AN ORGANIZED HEALTH CARE EDUCATION/TRAINING PROGRAM

## 2021-09-09 PROCEDURE — 770001 HCHG ROOM/CARE - MED/SURG/GYN PRIV*

## 2021-09-09 PROCEDURE — 99232 SBSQ HOSP IP/OBS MODERATE 35: CPT | Performed by: INTERNAL MEDICINE

## 2021-09-09 RX ADMIN — NICOTINE POLACRILEX 2 MG: 2 GUM, CHEWING BUCCAL at 01:09

## 2021-09-09 RX ADMIN — AMLODIPINE BESYLATE 5 MG: 5 TABLET ORAL at 04:25

## 2021-09-09 RX ADMIN — HYDROMORPHONE HYDROCHLORIDE 2 MG: 2 TABLET ORAL at 17:27

## 2021-09-09 RX ADMIN — DEXAMETHASONE 6 MG: 6 TABLET ORAL at 04:25

## 2021-09-09 RX ADMIN — DOCUSATE SODIUM 50 MG AND SENNOSIDES 8.6 MG 2 TABLET: 8.6; 5 TABLET, FILM COATED ORAL at 04:25

## 2021-09-09 RX ADMIN — MORPHINE SULFATE 30 MG: 15 TABLET, FILM COATED, EXTENDED RELEASE ORAL at 04:25

## 2021-09-09 RX ADMIN — OXYCODONE AND ACETAMINOPHEN 1 TABLET: 10; 325 TABLET ORAL at 23:59

## 2021-09-09 RX ADMIN — DOCUSATE SODIUM 50 MG AND SENNOSIDES 8.6 MG 2 TABLET: 8.6; 5 TABLET, FILM COATED ORAL at 17:28

## 2021-09-09 RX ADMIN — NICOTINE TRANSDERMAL SYSTEM 21 MG: 21 PATCH, EXTENDED RELEASE TRANSDERMAL at 08:34

## 2021-09-09 RX ADMIN — LIDOCAINE 1 PATCH: 50 PATCH TOPICAL at 17:29

## 2021-09-09 RX ADMIN — LIDOCAINE 1 PATCH: 50 PATCH TOPICAL at 17:28

## 2021-09-09 RX ADMIN — LISINOPRIL 10 MG: 10 TABLET ORAL at 04:25

## 2021-09-09 RX ADMIN — ENOXAPARIN SODIUM 40 MG: 40 INJECTION SUBCUTANEOUS at 04:24

## 2021-09-09 RX ADMIN — NICOTINE TRANSDERMAL SYSTEM 21 MG: 21 PATCH, EXTENDED RELEASE TRANSDERMAL at 04:24

## 2021-09-09 RX ADMIN — MORPHINE SULFATE 30 MG: 15 TABLET, FILM COATED, EXTENDED RELEASE ORAL at 17:27

## 2021-09-09 ASSESSMENT — ENCOUNTER SYMPTOMS
BACK PAIN: 1
BLURRED VISION: 0
HEADACHES: 0
DOUBLE VISION: 0
PALPITATIONS: 0
CHILLS: 0
COUGH: 0
MYALGIAS: 0
FEVER: 0
HEARTBURN: 0
BRUISES/BLEEDS EASILY: 0
NAUSEA: 0
DIZZINESS: 0
HEMOPTYSIS: 0
DEPRESSION: 0

## 2021-09-09 ASSESSMENT — PAIN DESCRIPTION - PAIN TYPE
TYPE: ACUTE PAIN
TYPE: ACUTE PAIN

## 2021-09-09 NOTE — PROGRESS NOTES
[facility] Medicine Daily Progress Note    Date of Service  9/9/2021    Chief Complaint  Kayleen Alfredo is a 66 y.o. female admitted 8/28/2021 with COVID and pathologic spine fracture     Hospital Course  Patient is a 66-year-old female with no medical history of smoking.  Patient was recently admitted to the hospital 8/22-8/23/21, for similar complaints of intractable back pain.  Patient presented on 8/28/2021 with complaints of intractable back pain to the lumbar region that has been going on for approximately 1 month.  Patient called EMS and was given Tylenol by the ambulance and transported to the emergency room.  Patient left AMA on previous admission however during that admission MRI of lumbar spine demonstrated acute/subacute compression fracture of L2, subacute fracture of L4, compression fracture of L3 and focal lesions on L4 concerning for pathological fracture.  IR had previously been consulted and planned for osteocool RF ablation and biopsies however patient had left AMA.  CT chest abdomen pelvis completed in the emergency room which revealed bilateral pulmonary masses largest located in the right upper lobe measuring 2 x 2 cm possibly representing primary lung cancer with metastatic disease.  Additionally seen are enlarged right hilar and mediastinal lymph nodes consistent with metastatic adenopathy, an ill-defined mixed lytic and sclerotic bony metastasis.       Interval Problem Update      9/7/2021: The patient was admitted for further evaluation and management of back pain.  MRI of the lumbar spine shows evidence of compression fracture associated with lesions concerning for metastasis.  She underwent bone biopsy with IR, current pathology shows findings consistent with metastatic lung adenocarcinoma.  CT of the chest does show pulmonary masses.  Oncology has been consulted and would appreciate their recommendations.  At this time patient is able to ambulate independently but still has persistent back  pain.  She denies any bowel/bladder incontinence, no radiculopathy noted, no saddle anesthesia noted.  She continues to tolerate oral intake and denies any chest pains, palpitations, or worsening shortness of breath.  Pending repeat Covid test prior to discharge.  No other overnight events reported.    9/8/2021: The patient was seen and evaluated at bedside and states that she is in persistent pain.  There are some concerns that patient is exhibiting drug-seeking behavior as she is ambulating independently without issues which was also noted by physical therapy during their assessment.  We will continue to titrate her pain medications until adequate response.  At this time patient is tolerating oral intake without bowel/bladder disturbances.  Pending final oncology recommendations.  No other overnight events reported.     9/9/2021: The patient was seen and evaluated at bedside and states that pain is better controlled today. Oncology discussed care with the patient with recommendations for close outpatient follow up for palliative chemotherapy. The patient states that she will obtain a phone and be in contact with oncology. At this time patient does not have any chest pain, palpitations, or shortness of breath. Pending placement to covid house. No other overnight events reported.    I have personally seen and examined the patient at bedside. I discussed the plan of care with patient.    Consultants/Specialty  oncology   IR    Code Status  DNAR/DNI    Disposition  Patient is medically cleared   Anticipate discharge to to home with close outpatient follow-up.  I have placed the appropriate orders for post-discharge needs.    Review of Systems  Review of Systems   Constitutional: Negative for chills, fever and malaise/fatigue.   HENT: Negative for hearing loss and tinnitus.    Eyes: Negative for blurred vision and double vision.   Respiratory: Negative for cough and hemoptysis.    Cardiovascular: Negative for chest  pain and palpitations.   Gastrointestinal: Negative for heartburn and nausea.   Genitourinary: Negative for dysuria and urgency.   Musculoskeletal: Positive for back pain. Negative for joint pain and myalgias.   Skin: Negative for itching and rash.   Neurological: Negative for dizziness and headaches.   Endo/Heme/Allergies: Does not bruise/bleed easily.   Psychiatric/Behavioral: Negative for depression and suicidal ideas.        Physical Exam  Temp:  [36.1 °C (97 °F)-37.1 °C (98.7 °F)] 36.9 °C (98.5 °F)  Pulse:  [55-72] 70  Resp:  [18-20] 18  BP: (111-118)/(57-65) 116/65  SpO2:  [93 %-96 %] 94 %    Physical Exam  Vitals and nursing note reviewed.   Constitutional:       General: She is not in acute distress.     Appearance: Normal appearance. She is obese.   HENT:      Head: Normocephalic and atraumatic.      Mouth/Throat:      Mouth: Mucous membranes are moist.   Eyes:      Extraocular Movements: Extraocular movements intact.      Conjunctiva/sclera: Conjunctivae normal.      Pupils: Pupils are equal, round, and reactive to light.   Cardiovascular:      Rate and Rhythm: Normal rate and regular rhythm.      Pulses: Normal pulses.      Heart sounds: Normal heart sounds. No murmur heard.   No friction rub.   Pulmonary:      Effort: Pulmonary effort is normal.      Breath sounds: Normal breath sounds.   Abdominal:      General: Abdomen is flat. Bowel sounds are normal.      Palpations: Abdomen is soft.   Musculoskeletal:         General: No swelling or tenderness. Normal range of motion.      Cervical back: Normal range of motion and neck supple.      Comments: Lumbar tenderness    Skin:     General: Skin is warm and dry.      Findings: No rash.   Neurological:      General: No focal deficit present.      Mental Status: She is alert and oriented to person, place, and time. Mental status is at baseline.      Cranial Nerves: No cranial nerve deficit.   Psychiatric:         Mood and Affect: Mood normal.         Behavior:  Behavior normal.         Fluids    Intake/Output Summary (Last 24 hours) at 9/9/2021 1428  Last data filed at 9/9/2021 1347  Gross per 24 hour   Intake 540 ml   Output --   Net 540 ml       Laboratory  Recent Labs     09/08/21  0149 09/08/21  1356   WBC 12.7*  --    RBC 4.74  --    HEMOGLOBIN 14.3  --    HEMATOCRIT 44.9  --    MCV 94.7  --    MCH 30.2  --    MCHC 31.8*  --    RDW 50.5*  --    PLATELETCT 349 308   MPV 9.2  --      Recent Labs     09/08/21  0149   SODIUM 137   POTASSIUM 4.2   CHLORIDE 104   CO2 20   GLUCOSE 106*   BUN 19   CREATININE 0.58   CALCIUM 8.8                   Imaging  IR-KYPHOPLASTY,1 VERTEBRA,LUMBAR   Final Result      1. Thoracic Deep Bone Biopsy, Osteocool Radiofrequency Ablation, And Kyphoplasty At L2 With Biplane Fluoroscopic Guidance For A Pathologic Metastatic Fracture.      2. Lumbar Deep Bone Biopsy, Osteocool Radiofrequency Ablation, And Kyphoplasty At L4 With Biplane Fluoroscopic Guidance For A Pathologic Metastatic Fracture.      3. Clinical Or Telephone Followup Is Scheduled For One Month, 3 Months, And 6 Months Post Procedure.      MR-BRAIN-WITH & W/O   Final Result      1.  No evidence of acute territorial infarct, intracranial hemorrhage or mass lesion.   2.  Mild microangiopathic ischemic change versus demyelination or gliosis.   3.  Pansinusitis.   4.  Bilateral mastoid effusions.           Assessment/Plan  * Lumbar compression fracture (HCC)- (present on admission)  Assessment & Plan  MR lumbar spine from 8/23/2001 showed acute/subacute compression fracture of L2, L4, and L3, an approximately 10 mm peripherally enhancing lesion in the L4 vertebral body, focal T2 hyper intense lesions in the iliac bones concerning for osseous lesions with pathological fractures. IR were previously consulted for ostial cool RF ablation with biopsies, patient left AMA. Now planned for today  No spinal cord compression noted on imaging  Patient without cauda equina, bowel/bladder  incontinence, or radiculopathy     COVID-19  Assessment & Plan  First positive test on 9/2/2021  Patient currently asymptomatic and on room air  Pending repeat test prior to discharge back to shelter     H/O noncompliance with medical treatment, presenting hazards to health  Assessment & Plan  Recently left AGAINST MEDICAL ADVICE.  Continue to  on importance of adhering to medical recommendations and treatment.    Pulmonary mass  Assessment & Plan  CT chest, abdomen, pelvis with contrast from 8/23/2021 showed bilateral pulmonary masses (largest measuring 2 x 2 cm) likely representing primary lung cancer with metastatic disease. She had enlarged right hilar and mediastinal lymph nodes consistent with metastatic adenopathy.    Elevated alkaline phosphatase level- (present on admission)  Assessment & Plan  Likely due to metastatic disease.  Monitor    Tobacco dependence- (present on admission)  Assessment & Plan  Patient received counseling on tobacco cessation on admission.  Continue to  on quitting tobacco.     Metastatic disease (HCC)- (present on admission)  Assessment & Plan  Pulmonary mass with suspected spinal lesions. IR Osteocool RF ablation + BX on L2 and L4 with GA scheduled for today. Continue pain medications and dexamethasone.  Pathology consistent with malignancy  Pending final oncology recommendations        VTE prophylaxis: enoxaparin ppx    I have performed a physical exam and reviewed and updated ROS and Plan today (9/9/2021). In review of yesterday's note (9/8/2021), there are no changes except as documented above.

## 2021-09-09 NOTE — DISCHARGE PLANNING
Anticipated Discharge Disposition:   TBD    Action:    Re-referral to Covid housing sent.    Barriers to Discharge:    Placement acceptance  Covid positive on 9-2-21  Pt homeless    Plan:    F/U with Covid housing.

## 2021-09-09 NOTE — PROGRESS NOTES
"Patient refused to remove lidocaine patch. Educated patient on the need to have the skin free of the patch for 12 hours in order to not cause irritation to the area. Patient states \"It's not irritating my skin at all, it just itches a little. I do not want to take it off, I need the pain relief.\" continued to educate without any changes. Lidocaine patch left in place.   "

## 2021-09-09 NOTE — CARE PLAN
The patient is Stable - Low risk of patient condition declining or worsening    Shift Goals  Clinical Goals: discharge planning    Progress made toward(s) clinical / shift goals: Informed patient that Case management and nursing is setting up discharge for the patient.    Patient is not progressing towards the following goals: N/a

## 2021-09-10 PROCEDURE — 700111 HCHG RX REV CODE 636 W/ 250 OVERRIDE (IP): Performed by: INTERNAL MEDICINE

## 2021-09-10 PROCEDURE — A9270 NON-COVERED ITEM OR SERVICE: HCPCS | Performed by: INTERNAL MEDICINE

## 2021-09-10 PROCEDURE — 700101 HCHG RX REV CODE 250: Performed by: STUDENT IN AN ORGANIZED HEALTH CARE EDUCATION/TRAINING PROGRAM

## 2021-09-10 PROCEDURE — 700102 HCHG RX REV CODE 250 W/ 637 OVERRIDE(OP): Performed by: INTERNAL MEDICINE

## 2021-09-10 PROCEDURE — A9270 NON-COVERED ITEM OR SERVICE: HCPCS | Performed by: STUDENT IN AN ORGANIZED HEALTH CARE EDUCATION/TRAINING PROGRAM

## 2021-09-10 PROCEDURE — 700102 HCHG RX REV CODE 250 W/ 637 OVERRIDE(OP): Performed by: STUDENT IN AN ORGANIZED HEALTH CARE EDUCATION/TRAINING PROGRAM

## 2021-09-10 PROCEDURE — 770001 HCHG ROOM/CARE - MED/SURG/GYN PRIV*

## 2021-09-10 PROCEDURE — 99232 SBSQ HOSP IP/OBS MODERATE 35: CPT | Performed by: STUDENT IN AN ORGANIZED HEALTH CARE EDUCATION/TRAINING PROGRAM

## 2021-09-10 RX ADMIN — HYDROMORPHONE HYDROCHLORIDE 2 MG: 2 TABLET ORAL at 09:00

## 2021-09-10 RX ADMIN — LISINOPRIL 10 MG: 10 TABLET ORAL at 05:04

## 2021-09-10 RX ADMIN — ONDANSETRON 4 MG: 4 TABLET, ORALLY DISINTEGRATING ORAL at 09:15

## 2021-09-10 RX ADMIN — NICOTINE POLACRILEX 2 MG: 2 GUM, CHEWING BUCCAL at 14:45

## 2021-09-10 RX ADMIN — HYDROMORPHONE HYDROCHLORIDE 2 MG: 2 TABLET ORAL at 13:06

## 2021-09-10 RX ADMIN — NICOTINE TRANSDERMAL SYSTEM 21 MG: 21 PATCH, EXTENDED RELEASE TRANSDERMAL at 15:20

## 2021-09-10 RX ADMIN — MORPHINE SULFATE 30 MG: 15 TABLET, FILM COATED, EXTENDED RELEASE ORAL at 05:03

## 2021-09-10 RX ADMIN — DOCUSATE SODIUM 50 MG AND SENNOSIDES 8.6 MG 2 TABLET: 8.6; 5 TABLET, FILM COATED ORAL at 17:15

## 2021-09-10 RX ADMIN — LIDOCAINE 1 PATCH: 50 PATCH TOPICAL at 15:20

## 2021-09-10 RX ADMIN — HYDROMORPHONE HYDROCHLORIDE 2 MG: 2 TABLET ORAL at 19:17

## 2021-09-10 RX ADMIN — MORPHINE SULFATE 30 MG: 15 TABLET, FILM COATED, EXTENDED RELEASE ORAL at 17:15

## 2021-09-10 RX ADMIN — DOCUSATE SODIUM 50 MG AND SENNOSIDES 8.6 MG 2 TABLET: 8.6; 5 TABLET, FILM COATED ORAL at 05:05

## 2021-09-10 RX ADMIN — NICOTINE TRANSDERMAL SYSTEM 21 MG: 21 PATCH, EXTENDED RELEASE TRANSDERMAL at 05:05

## 2021-09-10 RX ADMIN — ENOXAPARIN SODIUM 40 MG: 40 INJECTION SUBCUTANEOUS at 05:05

## 2021-09-10 RX ADMIN — AMLODIPINE BESYLATE 5 MG: 5 TABLET ORAL at 05:04

## 2021-09-10 ASSESSMENT — PAIN DESCRIPTION - PAIN TYPE
TYPE: ACUTE PAIN
TYPE: ACUTE PAIN;CHRONIC PAIN

## 2021-09-10 ASSESSMENT — ENCOUNTER SYMPTOMS
BACK PAIN: 0
COUGH: 1
NERVOUS/ANXIOUS: 1
NAUSEA: 0
ORTHOPNEA: 0
BRUISES/BLEEDS EASILY: 0
HEADACHES: 0
MYALGIAS: 0
VOMITING: 0
COUGH: 0
BLURRED VISION: 0
NECK PAIN: 0
PALPITATIONS: 0
DEPRESSION: 0
DIZZINESS: 0
HEMOPTYSIS: 0
BACK PAIN: 1
DOUBLE VISION: 0
CHILLS: 0
FEVER: 0
HEARTBURN: 0

## 2021-09-10 NOTE — CARE PLAN
The patient is Stable - Low risk of patient condition declining or worsening    Shift Goals  Clinical Goals: discharge planning  Patient Goals: pain management  Family Goals: n/a    Progress made toward(s) clinical / shift goals:  Educated patient on pain rating scales, frequent pain assessments in place, medicating per MAR, non pharmaceutical pain relief such as heat pads and positioning in use.      Problem: Neuro Status  Goal: Neuro status will remain stable or improve  Outcome: Progressing     Problem: Self Care  Goal: Patient will have the ability to perform ADLs independently or with assistance (bathe, groom, dress, toilet and feed)  Outcome: Progressing     Problem: Risk for Aspiration  Goal: Patient's risk for aspiration will be absent or decrease  Outcome: Progressing     Problem: Urinary Elimination  Goal: Establish and maintain regular urinary output  Outcome: Progressing     Problem: Bowel Elimination  Goal: Establish and maintain regular bowel function  Outcome: Progressing     Problem: Respiratory  Goal: Patient will achieve/maintain optimum respiratory ventilation and gas exchange  Outcome: Progressing     Problem: Mobility  Goal: Patient's capacity to carry out activities will improve  Outcome: Progressing       Patient is not progressing towards the following goals:
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
## 2021-09-10 NOTE — DISCHARGE PLANNING
Anticipated Discharge Disposition:   [facility]  [address] [place]  [address]  [phone]     Meds to Beds    [facility] for follow up     Home Health     Taxi cab voucher    Action:    Pt accepted by [facility].  RN will see patient on Monday, 9-13-21.    Barriers to Discharge:    None    Plan:    RN SATHYA or CINTHYA please call [facility] at [phone] to verify that a top bunk bed is available.

## 2021-09-10 NOTE — PROGRESS NOTES
[facility] Medicine Daily Progress Note    Date of Service  9/10/2021    Chief Complaint  Kayleen Alfredo is a 66 y.o. female admitted 8/28/2021 with COVID and pathologic spine fracture     Hospital Course  Patient is a 66-year-old female with no medical history of smoking.  Patient was recently admitted to the hospital 8/22-8/23/21, for similar complaints of intractable back pain.  Patient presented on 8/28/2021 with complaints of intractable back pain to the lumbar region that has been going on for approximately 1 month.  Patient called EMS and was given Tylenol by the ambulance and transported to the emergency room.  Patient left AMA on previous admission however during that admission MRI of lumbar spine demonstrated acute/subacute compression fracture of L2, subacute fracture of L4, compression fracture of L3 and focal lesions on L4 concerning for pathological fracture.  IR had previously been consulted and planned for osteocool RF ablation and biopsies however patient had left AMA.  CT chest abdomen pelvis completed in the emergency room which revealed bilateral pulmonary masses largest located in the right upper lobe measuring 2 x 2 cm possibly representing primary lung cancer with metastatic disease.  Additionally seen are enlarged right hilar and mediastinal lymph nodes consistent with metastatic adenopathy, an ill-defined mixed lytic and sclerotic bony metastasis.       Interval Problem Update      9/7/2021: The patient was admitted for further evaluation and management of back pain.  MRI of the lumbar spine shows evidence of compression fracture associated with lesions concerning for metastasis.  She underwent bone biopsy with IR, current pathology shows findings consistent with metastatic lung adenocarcinoma.  CT of the chest does show pulmonary masses.  Oncology has been consulted and would appreciate their recommendations.  At this time patient is able to ambulate independently but still has persistent  back pain.  She denies any bowel/bladder incontinence, no radiculopathy noted, no saddle anesthesia noted.  She continues to tolerate oral intake and denies any chest pains, palpitations, or worsening shortness of breath.  Pending repeat Covid test prior to discharge.  No other overnight events reported.    9/8/2021: The patient was seen and evaluated at bedside and states that she is in persistent pain.  There are some concerns that patient is exhibiting drug-seeking behavior as she is ambulating independently without issues which was also noted by physical therapy during their assessment.  We will continue to titrate her pain medications until adequate response.  At this time patient is tolerating oral intake without bowel/bladder disturbances.  Pending final oncology recommendations.  No other overnight events reported.     9/9/2021: The patient was seen and evaluated at bedside and states that pain is better controlled today. Oncology discussed care with the patient with recommendations for close outpatient follow up for palliative chemotherapy. The patient states that she will obtain a phone and be in contact with oncology. At this time patient does not have any chest pain, palpitations, or shortness of breath. Pending placement to covid house. No other overnight events reported.    9/10 no overnight events. Discussed the discharge plan with the patient. She will go back to shelter tomorrow. CM is on board. No other complains.     I have personally seen and examined the patient at bedside. I discussed the plan of care with patient.    Consultants/Specialty  oncology   IR    Code Status  DNAR/DNI    Disposition  Patient is medically cleared   Anticipate discharge to to home with close outpatient follow-up.  I have placed the appropriate orders for post-discharge needs.    Review of Systems  Review of Systems   Constitutional: Negative for chills, fever and malaise/fatigue.   HENT: Negative for hearing loss and  tinnitus.    Eyes: Negative for blurred vision and double vision.   Respiratory: Negative for cough and hemoptysis.    Cardiovascular: Negative for chest pain and palpitations.   Gastrointestinal: Negative for heartburn and nausea.   Genitourinary: Negative for dysuria and urgency.   Musculoskeletal: Positive for back pain. Negative for joint pain and myalgias.   Skin: Negative for itching and rash.   Neurological: Negative for dizziness and headaches.   Endo/Heme/Allergies: Does not bruise/bleed easily.   Psychiatric/Behavioral: Negative for depression and suicidal ideas.        Physical Exam  Temp:  [36.1 °C (97 °F)-36.2 °C (97.1 °F)] 36.1 °C (97 °F)  Pulse:  [71-72] 72  Resp:  [18] 18  BP: ()/(50-65) 98/50  SpO2:  [96 %] 96 %    Physical Exam  Vitals and nursing note reviewed.   Constitutional:       General: She is not in acute distress.     Appearance: Normal appearance. She is obese.   HENT:      Head: Normocephalic and atraumatic.      Mouth/Throat:      Mouth: Mucous membranes are moist.   Eyes:      Extraocular Movements: Extraocular movements intact.      Conjunctiva/sclera: Conjunctivae normal.      Pupils: Pupils are equal, round, and reactive to light.   Cardiovascular:      Rate and Rhythm: Normal rate and regular rhythm.      Pulses: Normal pulses.      Heart sounds: Normal heart sounds. No murmur heard.   No friction rub.   Pulmonary:      Effort: Pulmonary effort is normal.      Breath sounds: Normal breath sounds.   Abdominal:      General: Abdomen is flat. Bowel sounds are normal.      Palpations: Abdomen is soft.   Musculoskeletal:         General: No swelling or tenderness. Normal range of motion.      Cervical back: Normal range of motion and neck supple.      Comments: Lumbar tenderness    Skin:     General: Skin is warm and dry.      Findings: No rash.   Neurological:      General: No focal deficit present.      Mental Status: She is alert and oriented to person, place, and time. Mental  status is at baseline.      Cranial Nerves: No cranial nerve deficit.   Psychiatric:         Mood and Affect: Mood normal.         Behavior: Behavior normal.         Fluids    Intake/Output Summary (Last 24 hours) at 9/10/2021 1417  Last data filed at 9/10/2021 0900  Gross per 24 hour   Intake 480 ml   Output --   Net 480 ml       Laboratory  Recent Labs     09/08/21  0149 09/08/21  1356   WBC 12.7*  --    RBC 4.74  --    HEMOGLOBIN 14.3  --    HEMATOCRIT 44.9  --    MCV 94.7  --    MCH 30.2  --    MCHC 31.8*  --    RDW 50.5*  --    PLATELETCT 349 308   MPV 9.2  --      Recent Labs     09/08/21  0149   SODIUM 137   POTASSIUM 4.2   CHLORIDE 104   CO2 20   GLUCOSE 106*   BUN 19   CREATININE 0.58   CALCIUM 8.8                   Imaging  IR-KYPHOPLASTY,1 VERTEBRA,LUMBAR   Final Result      1. Thoracic Deep Bone Biopsy, Osteocool Radiofrequency Ablation, And Kyphoplasty At L2 With Biplane Fluoroscopic Guidance For A Pathologic Metastatic Fracture.      2. Lumbar Deep Bone Biopsy, Osteocool Radiofrequency Ablation, And Kyphoplasty At L4 With Biplane Fluoroscopic Guidance For A Pathologic Metastatic Fracture.      3. Clinical Or Telephone Followup Is Scheduled For One Month, 3 Months, And 6 Months Post Procedure.      MR-BRAIN-WITH & W/O   Final Result      1.  No evidence of acute territorial infarct, intracranial hemorrhage or mass lesion.   2.  Mild microangiopathic ischemic change versus demyelination or gliosis.   3.  Pansinusitis.   4.  Bilateral mastoid effusions.           Assessment/Plan  * Lumbar compression fracture (HCC)- (present on admission)  Assessment & Plan  MR lumbar spine from 8/23/2001 showed acute/subacute compression fracture of L2, L4, and L3, an approximately 10 mm peripherally enhancing lesion in the L4 vertebral body, focal T2 hyper intense lesions in the iliac bones concerning for osseous lesions with pathological fractures. IR were previously consulted for ostial cool RF ablation with biopsies,  patient left AMA. Now planned for today  No spinal cord compression noted on imaging  Patient without cauda equina, bowel/bladder incontinence, or radiculopathy     COVID-19  Assessment & Plan  First positive test on 9/2/2021  Patient currently asymptomatic and on room air  Pending repeat test prior to discharge back to shelter     H/O noncompliance with medical treatment, presenting hazards to health  Assessment & Plan  Recently left AGAINST MEDICAL ADVICE.  Continue to  on importance of adhering to medical recommendations and treatment.    Pulmonary mass  Assessment & Plan  CT chest, abdomen, pelvis with contrast from 8/23/2021 showed bilateral pulmonary masses (largest measuring 2 x 2 cm) likely representing primary lung cancer with metastatic disease. She had enlarged right hilar and mediastinal lymph nodes consistent with metastatic adenopathy.    Elevated alkaline phosphatase level- (present on admission)  Assessment & Plan  Likely due to metastatic disease.  Monitor    Tobacco dependence- (present on admission)  Assessment & Plan  Patient received counseling on tobacco cessation on admission.  Continue to  on quitting tobacco.     Metastatic disease (HCC)- (present on admission)  Assessment & Plan  Pulmonary mass with suspected spinal lesions. IR Osteocool RF ablation + BX on L2 and L4 with GA scheduled for today. Continue pain medications and dexamethasone.  Pathology consistent with malignancy  Pending final oncology recommendations        VTE prophylaxis: enoxaparin ppx    I have performed a physical exam and reviewed and updated ROS and Plan today (9/10/2021). In review of yesterday's note (9/9/2021), there are no changes except as documented above.

## 2021-09-10 NOTE — PROGRESS NOTES
Oncology/Hematology Progress Note               Author: Kristi Lewis M.D. Date & Time created: 9/10/2021  2:18 PM   Diagnosis-metastatic lung cancer  COVID-19 positivity  Interval History:  Patient is anxious and wants to go home ASAP.  She has no questions for me today.    Review of Systems:  Review of Systems   Constitutional: Positive for malaise/fatigue. Negative for fever.   Respiratory: Positive for cough. Negative for hemoptysis.    Cardiovascular: Negative for chest pain, palpitations and orthopnea.   Gastrointestinal: Negative for heartburn, nausea and vomiting.   Musculoskeletal: Negative for back pain, myalgias and neck pain.   Psychiatric/Behavioral: Negative for depression. The patient is nervous/anxious.        Physical Exam:  Physical Exam  Constitutional:       Appearance: Normal appearance.   Cardiovascular:      Rate and Rhythm: Normal rate and regular rhythm.      Heart sounds: No murmur heard.     Pulmonary:      Effort: Pulmonary effort is normal. No respiratory distress.      Breath sounds: Normal breath sounds.   Abdominal:      General: Abdomen is flat. Bowel sounds are normal.      Palpations: Abdomen is soft. There is no mass.   Neurological:      Mental Status: She is alert.         Labs:          Recent Labs     21  014   SODIUM 137   POTASSIUM 4.2   CHLORIDE 104   CO2 20   BUN 19   CREATININE 0.58   MAGNESIUM 2.1   PHOSPHORUS 3.5   CALCIUM 8.8     Recent Labs     21  0149   GLUCOSE 106*     Recent Labs     21  0149 21  1356   RBC 4.74  --    HEMOGLOBIN 14.3  --    HEMATOCRIT 44.9  --    PLATELETCT 349 308     Recent Labs     21  014   WBC 12.7*     Recent Labs     21  0149   SODIUM 137   POTASSIUM 4.2   CHLORIDE 104   CO2 20   GLUCOSE 106*   BUN 19   CREATININE 0.58   CALCIUM 8.8     Hemodynamics:  Temp (24hrs), Av.2 °C (97.1 °F), Min:36.1 °C (97 °F), Max:36.2 °C (97.1 °F)  Temperature: 36.1 °C (97 °F)  Pulse  Av.6  Min: 55  Max: 92   Blood  Pressure : (!) 98/50     Respiratory:    Respiration: 18, Pulse Oximetry: 96 %        RUL Breath Sounds: Clear, RML Breath Sounds: Diminished, RLL Breath Sounds: Diminished, TERE Breath Sounds: Clear, LLL Breath Sounds: Diminished  Fluids:    Intake/Output Summary (Last 24 hours) at 9/10/2021 1418  Last data filed at 9/10/2021 0900  Gross per 24 hour   Intake 480 ml   Output --   Net 480 ml        GI/Nutrition:  Orders Placed This Encounter   Procedures   • Diet Order Diet: Regular     Standing Status:   Standing     Number of Occurrences:   1     Order Specific Question:   Diet:     Answer:   Regular [1]     Medical Decision Making, by Problem:  Active Hospital Problems    Diagnosis    • *Lumbar compression fracture (HCC) [S32.000A]    • COVID-19 [U07.1]    • Pulmonary mass [R91.8]    • H/O noncompliance with medical treatment, presenting hazards to health [Z91.19]    • Tobacco dependence [F17.200]    • Elevated alkaline phosphatase level [R74.8]    • Metastatic disease (HCC) [C79.9]        Plan:  Lung cancer-patient is aware that she is a stage IV malignancy and any treatment would be palliative and may extend her life span but is not curative.  She did not have any questions for me although she was encouraged to ask questions.  Patient wants to go home ASAP.  COVID-19 infection-she is stable but is persistently positive.  I explained to her that before discharge she needs to be negative for COVID-19.    Quality-Core Measures

## 2021-09-10 NOTE — CARE PLAN
The patient is Stable - Low risk of patient condition declining or worsening    Shift Goals  Clinical Goals: Discharge planning  Patient Goals: discharge  Family Goals: n/a    Problem: Neuro Status  Goal: Neuro status will remain stable or improve  Outcome: Progressing     Problem: Self Care  Goal: Patient will have the ability to perform ADLs independently or with assistance (bathe, groom, dress, toilet and feed)  Outcome: Progressing     Problem: Respiratory  Goal: Patient will achieve/maintain optimum respiratory ventilation and gas exchange  Outcome: Progressing

## 2021-09-10 NOTE — DISCHARGE PLANNING
Received Choice form at 1343  Agency/Facility Name: [facility]   Referral sent per Choice form @ [id number]  Agency/Facility Name: [facility]   Outcome: Pt declined per epic response for insurance capacity    DPA Sent Referral to [facility]     1609  Agency/Facility Name: [facility]  Spoke To: Admissions  Outcome: Pt declined due to insufficient staffing     DPA sent Referral to [facility]    1617  Agency/Facility Name:[facility]  Spoke To: Guerrero  Outcome: Pt accepted, per guerrero the pt just need to establish  with PCP Tuesday and he will have someone contact her tomorrow afternoon

## 2021-09-10 NOTE — FACE TO FACE
Face to Face Supporting Documentation - Home Health    The encounter with this patient was in whole or in part the primary reason for home health admission.    Date of encounter:   Patient:                    MRN:                       YOB: 2020  Kayleen Alfredo  [id number]  1955     Home health to see patient for:  Skilled Nursing care for assessment, interventions & education and Home health aide    Skilled need for:  Comment: stage 4 cancer    Skilled nursing interventions to include:  Comment: home aids, monitoring    Homebound status evidenced by:  Have a condition such that leaving his or her home is medically contraindicated. Leaving home requires a considerable and taxing effort. There is a normal inability to leave the home.    Community Physician to provide follow up care: Pcp Unknown     Optional Interventions? No      I certify the face to face encounter for this home health care referral meets the CMS requirements and the encounter/clinical assessment with the patient was, in whole, or in part, for the medical condition(s) listed above, which is the primary reason for home health care. Based on my clinical findings: the service(s) are medically necessary, support the need for home health care, and the homebound criteria are met.  I certify that this patient has had a face to face encounter by myself.  Kallie Sainz M.D. - NPI: [id number]

## 2021-09-11 ENCOUNTER — PHARMACY VISIT (OUTPATIENT)
Dept: PHARMACY | Facility: MEDICAL CENTER | Age: 66
End: 2021-09-11
Payer: COMMERCIAL

## 2021-09-11 VITALS
OXYGEN SATURATION: 95 % | BODY MASS INDEX: 20.7 KG/M2 | WEIGHT: 121.25 LBS | DIASTOLIC BLOOD PRESSURE: 49 MMHG | RESPIRATION RATE: 17 BRPM | HEART RATE: 76 BPM | SYSTOLIC BLOOD PRESSURE: 100 MMHG | HEIGHT: 64 IN | TEMPERATURE: 97.8 F

## 2021-09-11 PROCEDURE — A9270 NON-COVERED ITEM OR SERVICE: HCPCS | Performed by: INTERNAL MEDICINE

## 2021-09-11 PROCEDURE — RXMED WILLOW AMBULATORY MEDICATION CHARGE: Performed by: STUDENT IN AN ORGANIZED HEALTH CARE EDUCATION/TRAINING PROGRAM

## 2021-09-11 PROCEDURE — 700111 HCHG RX REV CODE 636 W/ 250 OVERRIDE (IP): Performed by: INTERNAL MEDICINE

## 2021-09-11 PROCEDURE — A9270 NON-COVERED ITEM OR SERVICE: HCPCS | Performed by: STUDENT IN AN ORGANIZED HEALTH CARE EDUCATION/TRAINING PROGRAM

## 2021-09-11 PROCEDURE — 99239 HOSP IP/OBS DSCHRG MGMT >30: CPT | Performed by: STUDENT IN AN ORGANIZED HEALTH CARE EDUCATION/TRAINING PROGRAM

## 2021-09-11 PROCEDURE — 700102 HCHG RX REV CODE 250 W/ 637 OVERRIDE(OP): Performed by: INTERNAL MEDICINE

## 2021-09-11 PROCEDURE — 700102 HCHG RX REV CODE 250 W/ 637 OVERRIDE(OP): Performed by: STUDENT IN AN ORGANIZED HEALTH CARE EDUCATION/TRAINING PROGRAM

## 2021-09-11 RX ORDER — AMLODIPINE BESYLATE 5 MG/1
5 TABLET ORAL DAILY
Qty: 30 TABLET | Refills: 0 | Status: SHIPPED | OUTPATIENT
Start: 2021-09-12 | End: 2021-10-12

## 2021-09-11 RX ORDER — OXYCODONE HYDROCHLORIDE AND ACETAMINOPHEN 5; 325 MG/1; MG/1
1 TABLET ORAL EVERY 6 HOURS PRN
Qty: 12 TABLET | Refills: 0 | Status: SHIPPED | OUTPATIENT
Start: 2021-09-11 | End: 2021-09-14

## 2021-09-11 RX ORDER — ONDANSETRON 4 MG/1
4 TABLET, ORALLY DISINTEGRATING ORAL EVERY 6 HOURS PRN
Qty: 40 TABLET | Refills: 0 | Status: SHIPPED | OUTPATIENT
Start: 2021-09-11 | End: 2021-09-21

## 2021-09-11 RX ORDER — AMLODIPINE BESYLATE 5 MG/1
5 TABLET ORAL DAILY
Qty: 30 TABLET | Refills: 0 | Status: SHIPPED | OUTPATIENT
Start: 2021-09-12 | End: 2021-09-11 | Stop reason: SDUPTHER

## 2021-09-11 RX ORDER — MORPHINE SULFATE 30 MG/1
30 TABLET, FILM COATED, EXTENDED RELEASE ORAL EVERY 12 HOURS
Qty: 10 TABLET | Refills: 0 | Status: SHIPPED | OUTPATIENT
Start: 2021-09-11 | End: 2021-09-16

## 2021-09-11 RX ORDER — LISINOPRIL 5 MG/1
5 TABLET ORAL DAILY
Qty: 30 TABLET | Refills: 0 | Status: SHIPPED | OUTPATIENT
Start: 2021-09-11 | End: 2021-09-11

## 2021-09-11 RX ADMIN — MORPHINE SULFATE 30 MG: 15 TABLET, FILM COATED, EXTENDED RELEASE ORAL at 05:26

## 2021-09-11 RX ADMIN — DOCUSATE SODIUM 50 MG AND SENNOSIDES 8.6 MG 2 TABLET: 8.6; 5 TABLET, FILM COATED ORAL at 05:27

## 2021-09-11 RX ADMIN — HYDROMORPHONE HYDROCHLORIDE 2 MG: 2 TABLET ORAL at 01:20

## 2021-09-11 RX ADMIN — ENOXAPARIN SODIUM 40 MG: 40 INJECTION SUBCUTANEOUS at 05:27

## 2021-09-11 RX ADMIN — AMLODIPINE BESYLATE 5 MG: 5 TABLET ORAL at 05:26

## 2021-09-11 RX ADMIN — LISINOPRIL 10 MG: 10 TABLET ORAL at 05:27

## 2021-09-11 RX ADMIN — NICOTINE TRANSDERMAL SYSTEM 21 MG: 21 PATCH, EXTENDED RELEASE TRANSDERMAL at 05:28

## 2021-09-11 ASSESSMENT — PAIN DESCRIPTION - PAIN TYPE
TYPE: ACUTE PAIN

## 2021-09-11 ASSESSMENT — ENCOUNTER SYMPTOMS
MYALGIAS: 0
NECK PAIN: 0
DIZZINESS: 0
VOMITING: 0
BACK PAIN: 1
DEPRESSION: 0
NAUSEA: 0
ORTHOPNEA: 0
HEMOPTYSIS: 0
HEARTBURN: 0
PALPITATIONS: 0
COUGH: 1
HEADACHES: 0
NERVOUS/ANXIOUS: 1
MEMORY LOSS: 0
FEVER: 0

## 2021-09-11 NOTE — DISCHARGE PLANNING
Meds-to-Beds: Discharge prescription orders listed below delivered to patient's bedside. AIDA Frias notified and accepted the medication. Patient counseling deferred.      Current Outpatient Medications   Medication Sig Dispense Refill   • ondansetron (ZOFRAN ODT) 4 MG TABLET DISPERSIBLE Dissolve 1 Tablet by mouth every 6 hours as needed for Nausea for up to 10 days. 40 Tablet 0   • [START ON 9/12/2021] amLODIPine (NORVASC) 5 MG Tab Take 1 Tablet by mouth every day. 30 Tablet 0   • oxyCODONE-acetaminophen (PERCOCET) 5-325 MG Tab Take 1 Tablet by mouth every 6 hours as needed for up to 3 days. 12 Tablet 0   • morphine ER (MS CONTIN) 30 MG Tab CR tablet Take 1 Tablet by mouth every 12 hours for 5 days. 10 Tablet 0        Zachary Gundersen, Pharmacy Intern

## 2021-09-11 NOTE — DISCHARGE SUMMARY
Discharge Summary    CHIEF COMPLAINT ON ADMISSION  Chief Complaint   Patient presents with   • Low Back Pain     pt states low back painx1 month. pt states given tylenol by [facility]. pt ambulatory into triage room       Reason for Admission  low back pain     Admission Date  8/28/2021    CODE STATUS  DNAR/DNI    HPI & HOSPITAL COURSE  Patient is a 66-year-old female with no medical history of smoking.  Patient was recently admitted to the hospital 8/22-8/23/21, for similar complaints of intractable back pain.  Patient presented on 8/28/2021 with complaints of intractable back pain to the lumbar region that has been going on for approximately 1 month.  Patient called EMS and was given Tylenol by the ambulance and transported to the emergency room.  Patient left AMA on previous admission however during that admission MRI of lumbar spine demonstrated acute/subacute compression fracture of L2, subacute fracture of L4, compression fracture of L3 and focal lesions on L4 concerning for pathological fracture.  IR had previously been consulted and planned for osteocool RF ablation and biopsies however patient had left AMA.  CT chest abdomen pelvis completed in the emergency room which revealed bilateral pulmonary masses largest located in the right upper lobe measuring 2 x 2 cm possibly representing primary lung cancer with metastatic disease.  Additionally seen are enlarged right hilar and mediastinal lymph nodes consistent with metastatic adenopathy, an ill-defined mixed lytic and sclerotic bony metastasis.     Oncology consulted, recommended palliative treatment given stage IV lung cancer.  Patient will continue to follow-up with oncologist as outpatient.  Her BP has been in the lower range, patient reported dizziness. Discontinue lisinopril.  Continue amlodipine 5 mg daily.  Hold the medication if SBP less than 100.  Home health arranged for home aids.  Patient will be discharged back to shelter.       Therefore, she is  discharged in fair and stable condition to home with organized home healthcare and close outpatient follow-up.    The patient met 2-midnight criteria for an inpatient stay at the time of discharge.    Discharge Date  9/11/2021    FOLLOW UP ITEMS POST DISCHARGE  Follow-up with your primary care physician in 1 week  Follow-up with oncologist in 1 week    DISCHARGE DIAGNOSES  Principal Problem:    Lumbar compression fracture (HCC) POA: Yes  Active Problems:    Metastatic disease (HCC) POA: Yes    Tobacco dependence (Chronic) POA: Yes    Elevated alkaline phosphatase level POA: Yes    Pulmonary mass POA: Unknown    H/O noncompliance with medical treatment, presenting hazards to health POA: Unknown    COVID-19 POA: Unknown  Resolved Problems:    * No resolved hospital problems. *      FOLLOW UP    [facility]  [address] [facility]  [place] [address] [phone]  In 1 week      Emily Jensen M.D.  [address] [facility]  [id number]  Go on 9/14/2021  Hospital follow up appointment at 1:45 p.m./  Your primary care provider will be HALLE Bryant going forward at same location.      MEDICATIONS ON DISCHARGE     Medication List      START taking these medications      Instructions   amLODIPine 5 MG Tabs  Start taking on: September 12, 2021  Commonly known as: NORVASC   Take 1 Tablet by mouth every day.  Dose: 5 mg     morphine ER 30 MG Tbcr tablet  Commonly known as: MS CONTIN   Take 1 Tablet by mouth every 12 hours for 5 days.  Dose: 30 mg     ondansetron 4 MG Tbdp  Commonly known as: ZOFRAN ODT   Dissolve 1 Tablet by mouth every 6 hours as needed for Nausea for up to 10 days.  Dose: 4 mg     oxyCODONE-acetaminophen 5-325 MG Tabs  Commonly known as: PERCOCET   Take 1 Tablet by mouth every 6 hours as needed for up to 3 days.  Dose: 1 Tablet            Allergies  Allergies   Allergen Reactions   • Macrodantin [Nitrofurantoin]      High fever       DIET  Orders Placed This Encounter   Procedures    • Diet Order Diet: Regular     Standing Status:   Standing     Number of Occurrences:   1     Order Specific Question:   Diet:     Answer:   Regular [1]       ACTIVITY  As tolerated.  Weight bearing as tolerated    CONSULTATIONS  Oncology    PROCEDURES      LABORATORY  Lab Results   Component Value Date    SODIUM 137 09/08/2021    POTASSIUM 4.2 09/08/2021    CHLORIDE 104 09/08/2021    CO2 20 09/08/2021    GLUCOSE 106 (H) 09/08/2021    BUN 19 09/08/2021    CREATININE 0.58 09/08/2021        Lab Results   Component Value Date    WBC 12.7 (H) 09/08/2021    HEMOGLOBIN 14.3 09/08/2021    HEMATOCRIT 44.9 09/08/2021    PLATELETCT 308 09/08/2021        Total time of the discharge process exceeds 32 minutes.

## 2021-09-11 NOTE — PROGRESS NOTES
Oncology/Hematology Progress Note               Author: Kristi Lewis M.D. Date & Time created: 2021  12:09 PM   Diagnosis-metastatic lung cancer  COVID-19 positivity  Interval History:  Patient is anxious and wants to go home ASAP.  She has no questions for me today.    Review of Systems:  Review of Systems   Constitutional: Positive for malaise/fatigue. Negative for fever.   Respiratory: Positive for cough. Negative for hemoptysis.    Cardiovascular: Negative for chest pain, palpitations and orthopnea.   Gastrointestinal: Negative for heartburn, nausea and vomiting.   Musculoskeletal: Positive for back pain and joint pain. Negative for myalgias and neck pain.   Neurological: Negative for dizziness and headaches.   Psychiatric/Behavioral: Negative for depression and memory loss. The patient is nervous/anxious.        Physical Exam:  Physical Exam  Constitutional:       Appearance: Normal appearance.   Cardiovascular:      Rate and Rhythm: Normal rate and regular rhythm.      Heart sounds: No murmur heard.     Pulmonary:      Effort: Pulmonary effort is normal. No respiratory distress.      Breath sounds: Normal breath sounds.   Abdominal:      General: Abdomen is flat. Bowel sounds are normal.      Palpations: Abdomen is soft. There is no mass.   Skin:     General: Skin is warm and dry.   Neurological:      General: No focal deficit present.      Mental Status: She is alert and oriented to person, place, and time.         Labs:          No results for input(s): SODIUM, POTASSIUM, CHLORIDE, CO2, BUN, CREATININE, MAGNESIUM, PHOSPHORUS, CALCIUM in the last 72 hours.  No results for input(s): ALTSGPT, ASTSGOT, ALKPHOSPHAT, TBILIRUBIN, DBILIRUBIN, GAMMAGT, AMYLASE, LIPASE, ALB, PREALBUMIN, GLUCOSE in the last 72 hours.  Recent Labs     21  1356   PLATELETCT 308             Hemodynamics:  Temp (24hrs), Av.3 °C (97.3 °F), Min:36.1 °C (97 °F), Max:36.6 °C (97.8 °F)  Temperature: 36.6 °C (97.8 °F)  Pulse   Av.8  Min: 55  Max: 92   Blood Pressure : 100/49     Respiratory:    Respiration: 17, Pulse Oximetry: 95 %        RUL Breath Sounds: Clear, RML Breath Sounds: Diminished, RLL Breath Sounds: Diminished, TERE Breath Sounds: Clear, LLL Breath Sounds: Diminished  Fluids:    Intake/Output Summary (Last 24 hours) at 9/10/2021 1418  Last data filed at 9/10/2021 0900  Gross per 24 hour   Intake 480 ml   Output --   Net 480 ml        GI/Nutrition:  Orders Placed This Encounter   Procedures   • Diet Order Diet: Regular     Standing Status:   Standing     Number of Occurrences:   1     Order Specific Question:   Diet:     Answer:   Regular [1]     Medical Decision Making, by Problem:  Active Hospital Problems    Diagnosis    • *Lumbar compression fracture (HCC) [S32.000A]    • COVID-19 [U07.1]    • Pulmonary mass [R91.8]    • H/O noncompliance with medical treatment, presenting hazards to health [Z91.19]    • Tobacco dependence [F17.200]    • Elevated alkaline phosphatase level [R74.8]    • Metastatic disease (HCC) [C79.9]        Plan:  Lung cancer-patient is aware that she is a stage IV malignancy and any treatment would be palliative and may extend her life span but is not curative.  I am very concerned about her ability to follow-up with us.  Patient does not on cell phone.  I have been given the number of the shelter where she will be going but the patient tells me today that she plans to stay there for 2 to 3 days only.  I did give her my office contact information and have advised to call the office on Monday to make a follow-up appointment with me.  She has verbalized understanding about these issues.    COVID-19 infection-she is stable but is persistently positive.  I explained to her that before discharge she needs to be negative for COVID-19.  Smoker-patient is an active smoker she understands the relationship between development of lung cancer and smoking and the possibility that continuing smoking may make her  treatments less effective.  However the patient is not willing to give up smoking.  Per her this is her only pleasure.  Thank you  Quality-Core Measures

## 2021-09-11 NOTE — DISCHARGE PLANNING
Approved service for $25.37 for the following medications:    -amlodipine $7.77  -lisinopril $7.74  -ondansetron $9.86.    Faxed to [phone].

## 2021-09-11 NOTE — DISCHARGE INSTRUCTIONS
Discharge Instructions    Discharged to other by car with self. Discharged via wheelchair, hospital escort: Yes.  Special equipment needed: Not Applicable    Be sure to schedule a follow-up appointment with your primary care doctor or any specialists as instructed.     Discharge Plan:   Diet Plan: Discussed  Confirmed Follow up Appointment: No (Comments)  Confirmed Symptoms Management: Discussed  Medication Reconciliation Updated: Yes    I understand that a diet low in cholesterol, fat, and sodium is recommended for good health. Unless I have been given specific instructions below for another diet, I accept this instruction as my diet prescription.   Other diet: Regular    Special Instructions: None    · Is patient discharged on Warfarin / Coumadin?   No     Depression / Suicide Risk    As you are discharged from this [facility] facility, it is important to learn how to keep safe from harming yourself.    Recognize the warning signs:  · Abrupt changes in personality, positive or negative- including increase in energy   · Giving away possessions  · Change in eating patterns- significant weight changes-  positive or negative  · Change in sleeping patterns- unable to sleep or sleeping all the time   · Unwillingness or inability to communicate  · Depression  · Unusual sadness, discouragement and loneliness  · Talk of wanting to die  · Neglect of personal appearance   · Rebelliousness- reckless behavior  · Withdrawal from people/activities they love  · Confusion- inability to concentrate     If you or a loved one observes any of these behaviors or has concerns about self-harm, here's what you can do:  · Talk about it- your feelings and reasons for harming yourself  · Remove any means that you might use to hurt yourself (examples: pills, rope, extension cords, firearm)  · Get professional help from the community (Mental Health, Substance Abuse, psychological counseling)  · Do not be alone:Call your Safe Contact-  someone whom you trust who will be there for you.  · Call your local CRISIS HOTLINE 060-5691 or 346-886-2158  · Call your local Children's Mobile Crisis Response Team [place] (514) 299-0261 or www.[employer]  · Call the toll free National Suicide Prevention Hotlines   · National Suicide Prevention Lifeline 892-728-LCKC (9816)  · National Hope Line Network 800-SUICIDE (351-5080)    Discharge Instructions    Discharged to group home by car with escort. Discharged via wheelchair, hospital escort: Yes.  Special equipment needed: Not Applicable    Be sure to schedule a follow-up appointment with your primary care doctor or any specialists as instructed.     Discharge Plan:   Diet Plan: Discussed  Confirmed Follow up Appointment: No (Comments)  Confirmed Symptoms Management: Discussed  Medication Reconciliation Updated: Yes    I understand that a diet low in cholesterol, fat, and sodium is recommended for good health. Unless I have been given specific instructions below for another diet, I accept this instruction as my diet prescription.   Other diet: regular    Special Instructions: None    · Is patient discharged on Warfarin / Coumadin?   No     Depression / Suicide Risk    As you are discharged from this [facility] facility, it is important to learn how to keep safe from harming yourself.    Recognize the warning signs:  · Abrupt changes in personality, positive or negative- including increase in energy   · Giving away possessions  · Change in eating patterns- significant weight changes-  positive or negative  · Change in sleeping patterns- unable to sleep or sleeping all the time   · Unwillingness or inability to communicate  · Depression  · Unusual sadness, discouragement and loneliness  · Talk of wanting to die  · Neglect of personal appearance   · Rebelliousness- reckless behavior  · Withdrawal from people/activities they love  · Confusion- inability to concentrate     If you or a loved one observes  any of these behaviors or has concerns about self-harm, here's what you can do:  · Talk about it- your feelings and reasons for harming yourself  · Remove any means that you might use to hurt yourself (examples: pills, rope, extension cords, firearm)  · Get professional help from the community (Mental Health, Substance Abuse, psychological counseling)  · Do not be alone:Call your Safe Contact- someone whom you trust who will be there for you.  · Call your local CRISIS HOTLINE 188-9590 or 920-453-7671  · Call your local Children's Mobile Crisis Response Team [place] (423) 745-2358 or www.[employer]  · Call the toll free National Suicide Prevention Hotlines   · National Suicide Prevention Lifeline 156-171-VCDD (6241)  · [employer] Line Network 800-SUICIDE (752-4965)    Bone Metastasis    Bone metastasis is cancer that has spread from the part of the body where it started to the bones. A person may have bone metastasis in one bone or in more than one bone. Cancer that spreads to the bones is different from cancer that starts in the bones (primary bone cancer). Bone metastasis is more common than primary bone cancer.  The spine is the most common area for bone metastasis to occur. Other common areas include:  · Hip bone (pelvis).  · Ribs.  · Skull.  · Long bones of the arm or leg.  Bone metastasis is painful. It also damages and weakens bones so that they may break easier, even from a minor injury.  What are the causes?  This condition is caused by cancer cells that spread to the bone. Cancer cells can spread to the rest of the body in two ways:  · Through the bloodstream.  · Through the vessels that carry white blood cells in the body (lymphatic system).  What increases the risk?  This condition is more likely to develop in people who have an advanced type of cancer that is known to spread to bone. Cancers that often spread to bone include:  · Breast cancer.  · Prostate cancer.  · Lung cancer.  · Thyroid  cancer.  · Kidney cancer.  · Multiple myeloma.  · Lymphoma.  What are the signs or symptoms?  The most common symptom of this condition is bone pain, especially while you are resting. Other symptoms include:  · A broken bone (fracture) that happens with little or no trauma.  · Low number of red blood cells (anemia). Bone destruction may damage the spongy tissue (bone marrow) in the center of bones where red blood cells are produced. Anemia can cause:  ? Weakness.  ? Shortness of breath.  ? Headache.  ? Dizziness.  · Back or neck pain with numbness or weakness.  · High levels of calcium in your blood (hypercalcemia). When bone is destroyed, calcium is released into your blood. Symptoms of hypercalcemia include:  ? Constipation.  ? Thirst.  ? Nausea.  ? Sleepiness.  How is this diagnosed?  This condition may be diagnosed based on:  · Your symptoms and medical history. Your health care provider may suspect this condition if you are being treated for cancer or have had cancer treatment in the past.  · A physical exam.  · Imaging studies, such as:  ? Bone X-rays, especially in the area where you have pain.  ? CT scan.  ? Bone scan.  ? MRI.  ? PET scan.  · Blood tests.  · Urine tests.  · A procedure to remove a piece of bone so it can be examined under a microscope (biopsy).  How is this treated?  Treatment for this condition depends on your overall health, the type of cancer you have, and how much the cancer has spread. You will work with a team of health care providers to determine which treatment is best for you. Treatment will focus on managing pain, preventing bone weakness, and slowing the spread of the cancer. Treatment may include:  · Radiation therapy. This treatment uses X-rays to kill cancer cells. It is most effective for reducing pain, stopping tumor growth, and lowering the risk of fractures.  · Radioisotope therapy. This treatment uses a radioactive medicine that is injected into your blood. The medicine  travels to areas where cancer cells are active and kills them.  · Chemotherapy. For this treatment, you are given cancer-killing medicines. You may have chemotherapy in cycles, with rest periods in between.  · Medicines that:  ? Help build bone (bisphosphonates and denosumab). These medicines are used to make bones stronger and control bone pain. They may also help to reduce hypercalcemia.  ? Reduce pain (opiates).  · Endocrine therapies. These therapies slow cancer growth by blocking specific chemical messengers (hormones). Some types of cancer, including breast and prostate cancers, may grow because of hormones in the body.  · Targeted therapy. These drugs are used to block the growth and spread of cancer cells. These drugs target a specific part of the cancer cell and usually cause fewer side effects than chemotherapy.  · Immunotherapies. These therapies use the body's defense system (immune system) to fight cancer cells.  · Surgery. You may have surgery to remove bone cancer or to prevent or repair a fracture.  Follow these instructions at home:    · Take medicines only as directed by your health care provider.  · If you are taking prescription pain medicine, take actions to prevent or treat constipation. Your health care provider may recommend that you:  ? Drink enough fluid to keep your urine pale yellow.  ? Eat foods that are high in fiber, such as fresh fruits and vegetables, whole grains, and beans.  ? Limit foods that are high in fat and processed sugars, such as fried and sweet foods.  ? Take an over-the-counter or prescription medicine for constipation.  · Use devices to help you move around (mobility aids) as needed, such as canes, walkers, or scooters.  · Do not drive or use heavy machinery while taking pain medicine.  · Do not drink alcohol.  · Do not use any products that contain nicotine or tobacco, such as cigarettes and e-cigarettes. If you need help quitting, ask your health care  provider.  · Keep all follow-up visits as told by your health care provider. This is important.  Contact a health care provider if:  · Your pain medicine is not helping.  · You are not able to care for yourself at home.  Get help right away if:  · You fall or have an injury.  · Your pain suddenly gets worse.  · You have trouble walking.  · You have numbness or tingling in your legs.  · You lose control of your bowels or your bladder.  · You are very sleepy or confused.  Summary  · Bone metastasis is cancer that has spread from the part of the body where it started to the bones. This condition is more common than cancer that starts in the bones (primary bone cancer).  · Bone metastasis is painful and damages and weakens the bones. It can also cause anemia and hypercalcemia.  · Treatment for this condition depends on your overall health, the type of cancer you have, and how much the cancer has spread.  · Work with your health care team to determine which treatment is best for you. Take all medicines only as told by your health care provider.  This information is not intended to replace advice given to you by your health care provider. Make sure you discuss any questions you have with your health care provider.  Document Released: 12/08/2003 Document Revised: 09/06/2019 Document Reviewed: 01/15/2019  [employer] Patient Education © 2020 [employer]      Follow-up with your primary care physician in 1 week  Follow-up with oncologist in 1 week

## 2021-09-12 LAB
A2 MACROGLOB SERPL-MCNC: 167 MG/DL (ref 131–293)
ALT SERPL-CCNC: 14 U/L (ref 5–40)
ANNOTATION COMMENT IMP: ABNORMAL
AST SERPL-CCNC: 11 U/L (ref 9–40)
BUN SERPL-SCNC: 19 MG/DL (ref 7–20)
CIRRHOMETER PT SCORE Q4850: 0.01
FIBROSIS STAGE SERPL QL: ABNORMAL
GGT SERPL-CCNC: 38 U/L (ref 7–33)
INFLAMETER META CLASS Q4853: ABNORMAL
INFLAMETER PT SCORE Q4852: 0.18
LIVER FIBR SCORE SERPL CALC.FIBROMETER: 0.21
PATHOLOGY STUDY: ABNORMAL
PROTHROM ACT/NOR PPP: 89 % (ref 90–120)

## 2021-09-16 ENCOUNTER — PATIENT OUTREACH (OUTPATIENT)
Dept: OTHER | Facility: MEDICAL CENTER | Age: 66
End: 2021-09-16

## 2021-09-16 NOTE — PROGRESS NOTES
"Follow up call to patient to verify she was able call and get cancer follow up appointment.  Left message at \"[facility]\" for patient to return call.  "

## 2021-09-20 ENCOUNTER — PATIENT OUTREACH (OUTPATIENT)
Dept: OTHER | Facility: MEDICAL CENTER | Age: 66
End: 2021-09-20

## 2021-09-20 NOTE — PROGRESS NOTES
"Pt returned call with other person in back ground.  Pt saying she needed to make an appointment to get pain medications and for chemo.  Let patient know that navigator was not a physician office.  Pt saying to let her finish talking because she can't follow information.  Pt repeating \"I need to make an appointment to see Ansley Jeffry\" with navigator's number.  Repeated to patient navigator was a \"support resource\" and that she needed to schedule to see Dr Lewis.  Pt saying she was told needed 1 week of chemo and could have 1 visitor, so needed to schedule with Dr Lewis.  Confirmed with patient.  Provided patient with contact information for Dr Lewis's office.  Could hear friend in background and talking at same time as navigator at times.  Pt saying her friend would write down number, heard number repeated correctly.  Pt needed very short and clear answers during conversation. Pt saying \"thank you\" to navigator.  She will call Dr Lewis's office to schedule.  Asked patient about follow up with primary care.  Pt saying she was from out of town so did not have one.  "

## 2021-09-22 ENCOUNTER — HOSPITAL ENCOUNTER (EMERGENCY)
Facility: MEDICAL CENTER | Age: 66
End: 2021-09-23
Attending: EMERGENCY MEDICINE
Payer: MEDICARE

## 2021-09-22 DIAGNOSIS — R52 INTRACTABLE PAIN: ICD-10-CM

## 2021-09-22 DIAGNOSIS — C79.51 MALIGNANT NEOPLASM METASTATIC TO BONE (HCC): ICD-10-CM

## 2021-09-22 DIAGNOSIS — M54.50 LUMBAR BACK PAIN: ICD-10-CM

## 2021-09-22 DIAGNOSIS — S32.040A COMPRESSION FRACTURE OF L4 VERTEBRA, INITIAL ENCOUNTER (HCC): ICD-10-CM

## 2021-09-22 PROCEDURE — 99284 EMERGENCY DEPT VISIT MOD MDM: CPT

## 2021-09-22 PROCEDURE — 96374 THER/PROPH/DIAG INJ IV PUSH: CPT

## 2021-09-22 PROCEDURE — 700111 HCHG RX REV CODE 636 W/ 250 OVERRIDE (IP): Performed by: EMERGENCY MEDICINE

## 2021-09-22 RX ORDER — HYDROMORPHONE HYDROCHLORIDE 1 MG/ML
1 INJECTION, SOLUTION INTRAMUSCULAR; INTRAVENOUS; SUBCUTANEOUS ONCE
Status: COMPLETED | OUTPATIENT
Start: 2021-09-23 | End: 2021-09-22

## 2021-09-22 RX ADMIN — HYDROMORPHONE HYDROCHLORIDE 1 MG: 1 INJECTION, SOLUTION INTRAMUSCULAR; INTRAVENOUS; SUBCUTANEOUS at 23:35

## 2021-09-22 ASSESSMENT — FIBROSIS 4 INDEX: FIB4 SCORE: 0.62

## 2021-09-23 VITALS
HEIGHT: 64 IN | SYSTOLIC BLOOD PRESSURE: 146 MMHG | DIASTOLIC BLOOD PRESSURE: 82 MMHG | OXYGEN SATURATION: 95 % | TEMPERATURE: 97.7 F | RESPIRATION RATE: 18 BRPM | HEART RATE: 84 BPM | WEIGHT: 120 LBS | BODY MASS INDEX: 20.49 KG/M2

## 2021-09-23 PROCEDURE — 96376 TX/PRO/DX INJ SAME DRUG ADON: CPT

## 2021-09-23 PROCEDURE — 700111 HCHG RX REV CODE 636 W/ 250 OVERRIDE (IP): Performed by: EMERGENCY MEDICINE

## 2021-09-23 RX ORDER — HYDROMORPHONE HYDROCHLORIDE 1 MG/ML
1 INJECTION, SOLUTION INTRAMUSCULAR; INTRAVENOUS; SUBCUTANEOUS ONCE
Status: COMPLETED | OUTPATIENT
Start: 2021-09-23 | End: 2021-09-23

## 2021-09-23 RX ORDER — ONDANSETRON 4 MG/1
4 TABLET, ORALLY DISINTEGRATING ORAL EVERY 6 HOURS PRN
Qty: 10 TABLET | Refills: 0 | Status: SHIPPED | OUTPATIENT
Start: 2021-09-23

## 2021-09-23 RX ORDER — MORPHINE SULFATE 30 MG/1
30 TABLET, FILM COATED, EXTENDED RELEASE ORAL EVERY 12 HOURS
Qty: 6 TABLET | Refills: 0 | Status: SHIPPED | OUTPATIENT
Start: 2021-09-23 | End: 2021-09-26

## 2021-09-23 RX ORDER — OXYCODONE HYDROCHLORIDE AND ACETAMINOPHEN 5; 325 MG/1; MG/1
1 TABLET ORAL EVERY 6 HOURS PRN
Qty: 11 TABLET | Refills: 0 | Status: SHIPPED | OUTPATIENT
Start: 2021-09-23 | End: 2021-09-26

## 2021-09-23 RX ADMIN — HYDROMORPHONE HYDROCHLORIDE 1 MG: 1 INJECTION, SOLUTION INTRAMUSCULAR; INTRAVENOUS; SUBCUTANEOUS at 00:52

## 2021-09-23 NOTE — ED NOTES
Lunch RN: [employer] contacted for  and safe transport to [facility]. Pt wheeled to lobby to await cab.

## 2021-09-23 NOTE — ED PROVIDER NOTES
ED Provider Note        Primary care provider: Pcp Unknown    I verified that the patient was wearing a mask and I was wearing appropriate PPE every time I entered the room. The patient's mask was on the patient at all times during my encounter except for a brief view of the oropharynx.      CHIEF COMPLAINT  Chief Complaint   Patient presents with   • Back Pain     hx of cancer in back, sts isnt being managed adequately.        DAVID Alfredo is a 66 y.o. female who presents to the Emergency Department with chief complaint of lower back pain.  Patient has history of stage IV lung cancer metastatic to multiple areas of the body including her spine.  Recent kyphoplasty in this area.  Patient was discharged from our hospital recently to follow-up with oncology and primary care.  She states that she is supposed to have a 5-day course of chemotherapy and she has not been scheduled for this.  It does not appear as though she has followed up with oncology since discharge.  Patient had been discharged from the hospital on MS Contin 30 mg twice daily with Percocet for breakthrough pain.  She states that she is out of these medications and now in severe pain.  No fevers no chills no cough no nausea no vomiting no changes in bowel or bladder habits no other acute symptoms or concerns.  Pain is currently rated as an 8 out of 10.  Pain is made worse with ambulation and somewhat better with rest    REVIEW OF SYSTEMS  10 systems reviewed and otherwise negative, pertinent positives and negatives listed in the history of present illness.    PAST MEDICAL HISTORY   Stage IV lung cancer    SURGICAL HISTORY   has a past surgical history that includes exam under anesthesia (9/22/2020) and rectal biopsy (N/A, 9/22/2020).    SOCIAL HISTORY  Social History     Tobacco Use   • Smoking status: Current Every Day Smoker     Packs/day: 0.50     Types: Cigarettes   • Smokeless tobacco: Never Used   Vaping Use   • Vaping Use: Never used  "  Substance Use Topics   • Alcohol use: Never   • Drug use: Never      Social History     Substance and Sexual Activity   Drug Use Never       FAMILY HISTORY  Non-Contributory    CURRENT MEDICATIONS  Home Medications     Reviewed by Katie Cabrera R.N. (Registered Nurse) on 09/22/21 at 2040  Med List Status: <None>   Medication Last Dose Status   amLODIPine (NORVASC) 5 MG Tab  Active                ALLERGIES  Allergies   Allergen Reactions   • Macrodantin [Nitrofurantoin]      High fever       PHYSICAL EXAM  VITAL SIGNS: BP (!) 161/95   Pulse 99   Temp 35.9 °C (96.7 °F) (Temporal)   Resp 17   Ht 1.626 m (5' 4\")   Wt 54.4 kg (120 lb)   SpO2 96%   BMI 20.60 kg/m²   Pulse ox interpretation: I interpret this pulse ox as normal.  Constitutional: Alert and oriented x 3, moderate distress  HEENT: Atraumatic normocephalic, pupils are equal round, extraocular movements are intact. The nares is clear, external ears are normal, mouth shows moist mucous membranes  Neck: no obvious JVD or tracheal deviation  Cardiovascular: Regular rate and rhythm no murmur rub or gallop   Thorax & Lungs: No respiratory distress, no wheezes rales or rhonchi, No chest tenderness.   GI: Soft nontender nondistended positive bowel sounds, no peritoneal signs  Skin: Warm dry no obvious acute rash or lesion  Musculoskeletal: Moving all extremities with normal range strength, no acute  deformity  Neurologic: Cranial nerves III through XII are grossly intact, no sensory deficit, no cerebellar dysfunction   Psychiatric: Appropriate affect for situation at this time      DIAGNOSTIC STUDIES / PROCEDURES        COURSE & MEDICAL DECISION MAKING  Pertinent Labs & Imaging studies reviewed. (See chart for details)    11:19 PM - Patient seen and examined at bedside.       Patient noted to have slightly elevated blood pressure likely circumstantial secondary to presenting complaint. Referred to primary care physician for further " "evaluation.        Medical Decision Making: Patient is not currently on chemotherapy afebrile vital signs are normal no sign of severe toxicity or bacterial infection I discussed with patient that she need to follow-up with her oncologist to arrange his chemotherapy that it would not be arranged through the emergency department and I did not feel comfortable pursuing any work-up or admission to the hospital for this I felt as though her only acute issue was pain control.  Patient was given a milligram of Dilaudid here.  Her  was reviewed and it appears that she had not filled any narcotic prescriptions from since previous discharge from the hospital.  She does have significant cancer and likely severe pain she is therefore given 3-day supply of MS Contin and Percocet.  She is given social work transportation back to her current place of residence.  Patient instructed to call her oncologist first thing tomorrow morning to call her primary care physician to help arrange pain management to return here for any worsening pain fevers chills nausea vomiting any other acute symptoms or concerns otherwise discharged in stable and improved condition.  BP (!) 161/95   Pulse 99   Temp 35.9 °C (96.7 °F) (Temporal)   Resp 17   Ht 1.626 m (5' 4\")   Wt 54.4 kg (120 lb)   SpO2 96%   BMI 20.60 kg/m²     Emeka Quilgey, JOSE ALEJANDROPMelodyR.N.  [address]  [address]  [facility] [address] [phone]    Schedule an appointment as soon as possible for a visit       Kristi Lewis M.D.  [address]  [facility] [address] [phone]    Schedule an appointment as soon as possible for a visit       [facility], Emergency Dept  [address] [facility] [address] [phone]    in 12-24 hours if symptoms persist, immediately If symptoms worsen, or if you develop any other symptoms or concerns    Prescription monitoring program queried and unremarkable.  Patient counseled on the risks of controlled " substances including potential risks and benefits proper use alternative treatments, cause the symptoms, provisions of treatment plan, risk of dependence addiction and overdose method safely dispose of the medication, the fact that they would be given no refills from the emergency department.     In prescribing controlled substances to this patient, I certify that I have obtained and reviewed the medical history of Kayleen Alfredo. I have also made a good herson effort to obtain applicable records from other providers who have treated the patient and records did not demonstrate any increased risk of substance abuse that would prevent me from prescribing controlled substances.     I have conducted a physical exam and documented it. I have reviewed Ms. Alfredo’s prescription history as maintained by the [place] Prescription Monitoring Program.     I have assessed the patient’s risk for abuse, dependency, and addiction using the validated Opioid Risk Tool available at https://www.mdcJobHive.com/peiezw-qaiq-sxfg-ort-narcotic-abuse.     Given the above, I believe the benefits of controlled substance therapy outweigh the risks. The reasons for prescribing controlled substances include non-narcotic, oral analgesic alternatives have been inadequate for pain control. Accordingly, I have discussed the risk and benefits, treatment plan, and alternative therapies with the patient.           Discharge Medication List as of 9/23/2021 12:56 AM      START taking these medications    Details   morphine ER (MS CONTIN) 30 MG Tab CR tablet Take 1 Tablet by mouth every 12 hours for 3 days., Disp-6 Tablet, R-0, Normal      oxyCODONE-acetaminophen (PERCOCET) 5-325 MG Tab Take 1 Tablet by mouth every 6 hours as needed for up to 3 days., Disp-11 Tablet, R-0, Normal      ondansetron (ZOFRAN ODT) 4 MG TABLET DISPERSIBLE Take 1 Tablet by mouth every 6 hours as needed., Disp-10 Tablet, R-0, Normal             FINAL IMPRESSION  1. Lumbar back pain  Active   2. Intractable pain    3. Compression fracture of L4 vertebra, initial encounter (HCC)    4. Malignant neoplasm metastatic to bone (HCC)        This dictation has been created using voice recognition software and/or scribes. The accuracy of the dictation is limited by the abilities of the software and the expertise of the scribes. I expect there may be some errors of grammar and possibly content. I made every attempt to manually correct the errors within my dictation. However, errors related to voice recognition software and/or scribes may still exist and should be interpreted within the appropriate context.

## 2021-09-23 NOTE — ED TRIAGE NOTES
"Chief Complaint   Patient presents with   • Back Pain     hx of cancer in back, sts isnt being managed adequately.        Pt sts was called by someone at [facility] to be seen in the ER for pain management and admit d/t cancer in back. Reporting this person told her they did not think her pain was being adequately addressed, to present to the ER right away. Unable to bear weight at this time.    Denies CP, SOB, urinary issues at this time.     BP (!) 161/95   Pulse 99   Temp 35.9 °C (96.7 °F) (Temporal)   Resp 17   Ht 1.626 m (5' 4\")   Wt 54.4 kg (120 lb)   SpO2 96%   BMI 20.60 kg/m²     "

## 2021-09-23 NOTE — DISCHARGE PLANNING
Pt in need of transportation home.   Pt lives at [facility] [address]  [facility] contacted [phone]  To verify Pt lives there and they were able to verify.     [facility] Voucher [id number]

## 2021-09-26 PROCEDURE — 99284 EMERGENCY DEPT VISIT MOD MDM: CPT

## 2021-09-26 ASSESSMENT — FIBROSIS 4 INDEX: FIB4 SCORE: 0.62

## 2021-09-27 ENCOUNTER — HOSPITAL ENCOUNTER (EMERGENCY)
Facility: MEDICAL CENTER | Age: 66
End: 2021-09-27
Attending: EMERGENCY MEDICINE
Payer: MEDICARE

## 2021-09-27 ENCOUNTER — PATIENT OUTREACH (OUTPATIENT)
Dept: HEALTH INFORMATION MANAGEMENT | Facility: OTHER | Age: 66
End: 2021-09-27

## 2021-09-27 VITALS
SYSTOLIC BLOOD PRESSURE: 150 MMHG | OXYGEN SATURATION: 95 % | DIASTOLIC BLOOD PRESSURE: 94 MMHG | RESPIRATION RATE: 15 BRPM | BODY MASS INDEX: 19.99 KG/M2 | TEMPERATURE: 97.4 F | HEIGHT: 65 IN | WEIGHT: 120 LBS | HEART RATE: 84 BPM

## 2021-09-27 DIAGNOSIS — G89.29 CHRONIC MIDLINE LOW BACK PAIN WITHOUT SCIATICA: ICD-10-CM

## 2021-09-27 DIAGNOSIS — C34.90 PRIMARY MALIGNANT NEOPLASM OF LUNG METASTATIC TO OTHER SITE, UNSPECIFIED LATERALITY (HCC): ICD-10-CM

## 2021-09-27 DIAGNOSIS — M54.50 CHRONIC MIDLINE LOW BACK PAIN WITHOUT SCIATICA: ICD-10-CM

## 2021-09-27 PROCEDURE — A9270 NON-COVERED ITEM OR SERVICE: HCPCS | Performed by: EMERGENCY MEDICINE

## 2021-09-27 PROCEDURE — 700102 HCHG RX REV CODE 250 W/ 637 OVERRIDE(OP): Performed by: EMERGENCY MEDICINE

## 2021-09-27 PROCEDURE — 96372 THER/PROPH/DIAG INJ SC/IM: CPT

## 2021-09-27 PROCEDURE — 700111 HCHG RX REV CODE 636 W/ 250 OVERRIDE (IP): Performed by: EMERGENCY MEDICINE

## 2021-09-27 RX ORDER — HYDROMORPHONE HYDROCHLORIDE 1 MG/ML
1 INJECTION, SOLUTION INTRAMUSCULAR; INTRAVENOUS; SUBCUTANEOUS ONCE
Status: COMPLETED | OUTPATIENT
Start: 2021-09-27 | End: 2021-09-27

## 2021-09-27 RX ORDER — OXYCODONE HYDROCHLORIDE AND ACETAMINOPHEN 5; 325 MG/1; MG/1
1 TABLET ORAL ONCE
Status: COMPLETED | OUTPATIENT
Start: 2021-09-27 | End: 2021-09-27

## 2021-09-27 RX ADMIN — OXYCODONE HYDROCHLORIDE AND ACETAMINOPHEN 1 TABLET: 5; 325 TABLET ORAL at 05:58

## 2021-09-27 RX ADMIN — HYDROMORPHONE HYDROCHLORIDE 1 MG: 1 INJECTION, SOLUTION INTRAMUSCULAR; INTRAVENOUS; SUBCUTANEOUS at 05:59
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
## 2021-09-27 NOTE — ED TRIAGE NOTES
Kayleen Alfredo  66 y.o.  female  Chief Complaint   Patient presents with   • Low Back Pain     Pt has a hx of bone ca, sees Dr. Ahmadi for same. Pt had rx of oxycodone & morphine but ran out today. C/o severe pain to low back, here for pain control.        BIB [facility]. Patient educated on triage process, to alert staff if any changes in condition.

## 2021-09-27 NOTE — ED NOTES
Assumed pt care. Pt given food and drink. Pt updated on POC . Spoke with  who is going to help with resources/medictions for pt.

## 2021-09-27 NOTE — DISCHARGE INSTRUCTIONS
Follow-up with primary care as scheduled for you for reevaluation, continued care and assistance with specialty follow-up.    Follow-up with oncology as soon as possible for treatment options.    No new prescriptions have been provided today.  Prescriptions for MS Contin and Percocet were sent to the [facility] pharmacy on 9/24.  You are encouraged to fill these as soon as possible,  will provide additional information.    Return to the emergency department for intractable pain, new pain pattern, lower extremity weakness or paresthesias, incontinence or urinary retention, difficulty breathing, altered mental status or other new concerns.

## 2021-09-27 NOTE — DISCHARGE PLANNING
LSW spoke to Ursula with palliative care. Will speak with pt this morning. Attempting to get pt's narcotics covered.

## 2021-09-27 NOTE — ED PROVIDER NOTES
"ED Provider Note    CHIEF COMPLAINT  Chief Complaint   Patient presents with   • Low Back Pain     Pt has a hx of bone ca, sees Dr. Ahmadi for same. Pt had rx of oxycodone & morphine but ran out today. C/o severe pain to low back, here for pain control.        HPI  Kayleen Alfredo is a 66 y.o. female who presents to the emergency department by ambulance for back pain.  Patient with history of metastatic lung cancer with multiple lytic lesions to the spine.  Previous compression fractures.  She describes acute on chronic back pain, states her pain is poorly controlled and she has no pain medicines at home.  Denies any new trauma or injury.  Denies any new pain pattern.  Denies any extremity weakness, paresthesias, incontinence or urinary retention.  No abdominal pain.  No fever or chills.  No nausea or vomiting.    Patient has not followed up with oncology as she said they canceled her appointment and did not reschedule.  She has not followed up with primary care.  She was seen here 4 days ago for the same and did not fill her prescriptions as provided for her for MS Contin and Percocet because \"they would not give him to me for free.\"    REVIEW OF SYSTEMS  See HPI for further details. All other systems are negative.     PAST MEDICAL HISTORY   As above    SOCIAL HISTORY  Social History     Tobacco Use   • Smoking status: Current Every Day Smoker     Packs/day: 0.50     Types: Cigarettes   • Smokeless tobacco: Never Used   Vaping Use   • Vaping Use: Never used   Substance and Sexual Activity   • Alcohol use: Never   • Drug use: Never   • Sexual activity: Not on file       SURGICAL HISTORY   has a past surgical history that includes exam under anesthesia (9/22/2020) and rectal biopsy (N/A, 9/22/2020).    CURRENT MEDICATIONS  Home Medications     Reviewed by Roxy Nash R.N. (Registered Nurse) on 09/26/21 at 6278  Med List Status: Not Addressed   Medication Last Dose Status   amLODIPine (NORVASC) 5 MG Tab 9/26/2021 " "Active   morphine ER (MS CONTIN) 30 MG Tab CR tablet 9/25/2021 Active   ondansetron (ZOFRAN ODT) 4 MG TABLET DISPERSIBLE  Active   oxyCODONE-acetaminophen (PERCOCET) 5-325 MG Tab 9/25/2021 Active                ALLERGIES  Allergies   Allergen Reactions   • Macrodantin [Nitrofurantoin]      High fever       PHYSICAL EXAM  VITAL SIGNS: /96   Pulse 88   Temp 36.3 °C (97.4 °F) (Temporal)   Resp 18   Ht 1.651 m (5' 5\")   Wt 54.4 kg (120 lb)   SpO2 96% Comment: RA  BMI 19.97 kg/m²   Pulse ox interpretation: I interpret this pulse ox as normal.  Constitutional: Alert in no apparent distress.  Disheveled.  HENT: Normocephalic, atraumatic. Bilateral external ears normal, Nose normal.   Eyes: Pupils are equal and reactive, Conjunctiva normal.   Neck: Normal range of motion, Supple  Cardiovascular: Normal peripheral perfusion.  Thorax & Lungs: Nonlabored respirations.  Abdomen: Soft, non-distended, non-tender to palpation.   Skin: Warm, Dry, No erythema, No rash.   Musculoskeletal: Good range of motion in all major joints. No major deformities noted.   Neurologic: Alert and oriented x3 but very poor historian.  Speech clear and cohesive.  Moves 4 extremities spontaneously.  Bears weight and ambulates independently.  Psychiatric: Odd affect.  Argumentative.      COURSE & MEDICAL DECISION MAKING  Nursing notes and vital signs were reviewed. (See chart for details)  The patients records were reviewed, history was obtained from the patient ;     Medical record review: Discharge summary 9/11 after admission for pain control.  Seen by oncology, Dr. Lewis, encourage outpatient follow-up for palliative treatment, he was made aware that there is no curative treatment.    Evaluation was consistent with acute on chronic back pain likely secondary to the known metastatic lung disease with multiple lytic lesions and chronic fractures in her spine.  She is neurologically intact and nonfocal.  Patient is resistant to any " discussion, is argumentative and only requesting pain medication.  She does have known pathology, with poor outpatient follow-up.  IM Dilaudid, oral Percocet provided in the emergency department.  Patient had significant improvement in her symptoms.  During her time here she was evaluated by  as well as palliative care, but provided resistance for further intervention and excuses for for outpatient follow-up.  She is again been encouraged to follow-up with primary care and Dr. Lewis.  Unfortunately  was not able to arrange for her medications to be paid for, but encouraged patient to return to pharmacy for medication checkup at a total of $37.  Patient states she has money in the bank, but has lost her ID and needs to get a new one before she can access her findings.  We have provided guidance at length, no indication for further intervention or work-up today.    Patient is stable for discharge at this time, anticipatory guidance provided, close follow-up is encouraged, and strict ED return instructions have been detailed. Patient is agreeable to the disposition and plan.    Patient's blood pressure was elevated in the emergency department, and has been referred to primary care for close monitoring.      FINAL IMPRESSION  (M54.5,  G89.29) Chronic midline low back pain without sciatica  (C34.90) Primary malignant neoplasm of lung metastatic to other site, unspecified laterality (HCC)      Electronically signed by: Ingrid Rahman D.O., 9/27/2021 8:06 AM      This dictation was created using voice recognition software. The accuracy of the dictation is limited to the abilities of the software. I expect there may be some errors of grammar and possibly content. The nursing notes were reviewed and certain aspects of this information were incorporated into this note.

## 2021-09-27 NOTE — ED NOTES
Patient vital signs rechecked and documented per Psychiatric. Patient denies any new needs at this time.  Patient updated on wait times, thanked for patience. Pt informed to alert triage tech or triage RN with any needs and/or changes in condition; patient verbalized understanding.

## 2021-09-27 NOTE — ED NOTES
Pt discharged home as ordered by erp. Pt instructed to follow up with all the resources in place and call her oncologist. Pt verbalized understanding. Pt instructed to  RXs at [facility]. Pt left ambulating independently

## 2021-10-10 ENCOUNTER — HOSPITAL ENCOUNTER (EMERGENCY)
Facility: MEDICAL CENTER | Age: 66
End: 2021-10-11
Attending: EMERGENCY MEDICINE
Payer: MEDICARE

## 2021-10-10 ENCOUNTER — APPOINTMENT (OUTPATIENT)
Dept: RADIOLOGY | Facility: MEDICAL CENTER | Age: 66
End: 2021-10-10
Attending: EMERGENCY MEDICINE
Payer: MEDICARE

## 2021-10-10 DIAGNOSIS — N39.0 ACUTE UTI: ICD-10-CM

## 2021-10-10 DIAGNOSIS — R10.9 LEFT FLANK PAIN: ICD-10-CM

## 2021-10-10 LAB
ALBUMIN SERPL BCP-MCNC: 3.9 G/DL (ref 3.2–4.9)
ALBUMIN/GLOB SERPL: 1.2 G/DL
ALP SERPL-CCNC: 164 U/L (ref 30–99)
ALT SERPL-CCNC: 31 U/L (ref 2–50)
ANION GAP SERPL CALC-SCNC: 12 MMOL/L (ref 7–16)
APPEARANCE UR: CLEAR
AST SERPL-CCNC: 28 U/L (ref 12–45)
BACTERIA #/AREA URNS HPF: ABNORMAL /HPF
BASOPHILS # BLD AUTO: 1.1 % (ref 0–1.8)
BASOPHILS # BLD: 0.14 K/UL (ref 0–0.12)
BILIRUB SERPL-MCNC: 0.3 MG/DL (ref 0.1–1.5)
BILIRUB UR QL STRIP.AUTO: NEGATIVE
BUN SERPL-MCNC: 18 MG/DL (ref 8–22)
CALCIUM SERPL-MCNC: 9.3 MG/DL (ref 8.5–10.5)
CHLORIDE SERPL-SCNC: 103 MMOL/L (ref 96–112)
CO2 SERPL-SCNC: 21 MMOL/L (ref 20–33)
COLOR UR: YELLOW
CREAT SERPL-MCNC: 0.75 MG/DL (ref 0.5–1.4)
EOSINOPHIL # BLD AUTO: 0.19 K/UL (ref 0–0.51)
EOSINOPHIL NFR BLD: 1.5 % (ref 0–6.9)
EPI CELLS #/AREA URNS HPF: NEGATIVE /HPF
ERYTHROCYTE [DISTWIDTH] IN BLOOD BY AUTOMATED COUNT: 51.6 FL (ref 35.9–50)
GLOBULIN SER CALC-MCNC: 3.2 G/DL (ref 1.9–3.5)
GLUCOSE SERPL-MCNC: 124 MG/DL (ref 65–99)
GLUCOSE UR STRIP.AUTO-MCNC: NEGATIVE MG/DL
HCT VFR BLD AUTO: 43.4 % (ref 37–47)
HGB BLD-MCNC: 14.2 G/DL (ref 12–16)
HYALINE CASTS #/AREA URNS LPF: ABNORMAL /LPF
IMM GRANULOCYTES # BLD AUTO: 0.06 K/UL (ref 0–0.11)
IMM GRANULOCYTES NFR BLD AUTO: 0.5 % (ref 0–0.9)
KETONES UR STRIP.AUTO-MCNC: NEGATIVE MG/DL
LEUKOCYTE ESTERASE UR QL STRIP.AUTO: ABNORMAL
LIPASE SERPL-CCNC: 81 U/L (ref 11–82)
LYMPHOCYTES # BLD AUTO: 2.28 K/UL (ref 1–4.8)
LYMPHOCYTES NFR BLD: 18 % (ref 22–41)
MCH RBC QN AUTO: 30.5 PG (ref 27–33)
MCHC RBC AUTO-ENTMCNC: 32.7 G/DL (ref 33.6–35)
MCV RBC AUTO: 93.1 FL (ref 81.4–97.8)
MICRO URNS: ABNORMAL
MONOCYTES # BLD AUTO: 1.03 K/UL (ref 0–0.85)
MONOCYTES NFR BLD AUTO: 8.1 % (ref 0–13.4)
NEUTROPHILS # BLD AUTO: 9 K/UL (ref 2–7.15)
NEUTROPHILS NFR BLD: 70.8 % (ref 44–72)
NITRITE UR QL STRIP.AUTO: POSITIVE
NRBC # BLD AUTO: 0 K/UL
NRBC BLD-RTO: 0 /100 WBC
PH UR STRIP.AUTO: 6 [PH] (ref 5–8)
PLATELET # BLD AUTO: 398 K/UL (ref 164–446)
PMV BLD AUTO: 9 FL (ref 9–12.9)
POTASSIUM SERPL-SCNC: 3.7 MMOL/L (ref 3.6–5.5)
PROT SERPL-MCNC: 7.1 G/DL (ref 6–8.2)
PROT UR QL STRIP: NEGATIVE MG/DL
RBC # BLD AUTO: 4.66 M/UL (ref 4.2–5.4)
RBC # URNS HPF: ABNORMAL /HPF
RBC UR QL AUTO: NEGATIVE
SODIUM SERPL-SCNC: 136 MMOL/L (ref 135–145)
SP GR UR STRIP.AUTO: >=1.045
UROBILINOGEN UR STRIP.AUTO-MCNC: 1 MG/DL
WBC # BLD AUTO: 12.7 K/UL (ref 4.8–10.8)
WBC #/AREA URNS HPF: ABNORMAL /HPF

## 2021-10-10 PROCEDURE — 96374 THER/PROPH/DIAG INJ IV PUSH: CPT | Mod: XU

## 2021-10-10 PROCEDURE — 81001 URINALYSIS AUTO W/SCOPE: CPT

## 2021-10-10 PROCEDURE — 74177 CT ABD & PELVIS W/CONTRAST: CPT | Mod: MF

## 2021-10-10 PROCEDURE — 700117 HCHG RX CONTRAST REV CODE 255: Performed by: EMERGENCY MEDICINE

## 2021-10-10 PROCEDURE — A9270 NON-COVERED ITEM OR SERVICE: HCPCS | Performed by: EMERGENCY MEDICINE

## 2021-10-10 PROCEDURE — 80053 COMPREHEN METABOLIC PANEL: CPT

## 2021-10-10 PROCEDURE — 99284 EMERGENCY DEPT VISIT MOD MDM: CPT

## 2021-10-10 PROCEDURE — 700111 HCHG RX REV CODE 636 W/ 250 OVERRIDE (IP): Performed by: STUDENT IN AN ORGANIZED HEALTH CARE EDUCATION/TRAINING PROGRAM

## 2021-10-10 PROCEDURE — 85025 COMPLETE CBC W/AUTO DIFF WBC: CPT

## 2021-10-10 PROCEDURE — 700102 HCHG RX REV CODE 250 W/ 637 OVERRIDE(OP): Performed by: EMERGENCY MEDICINE

## 2021-10-10 PROCEDURE — 83690 ASSAY OF LIPASE: CPT

## 2021-10-10 RX ORDER — NICOTINE 21 MG/24HR
1 PATCH, TRANSDERMAL 24 HOURS TRANSDERMAL ONCE
Status: DISCONTINUED | OUTPATIENT
Start: 2021-10-10 | End: 2021-10-11 | Stop reason: HOSPADM

## 2021-10-10 RX ORDER — MORPHINE SULFATE 4 MG/ML
4 INJECTION, SOLUTION INTRAMUSCULAR; INTRAVENOUS ONCE
Status: COMPLETED | OUTPATIENT
Start: 2021-10-10 | End: 2021-10-10

## 2021-10-10 RX ADMIN — NICOTINE TRANSDERMAL SYSTEM 21 MG: 21 PATCH, EXTENDED RELEASE TRANSDERMAL at 23:40

## 2021-10-10 RX ADMIN — MORPHINE SULFATE 4 MG: 4 INJECTION INTRAVENOUS at 21:44

## 2021-10-10 RX ADMIN — IOHEXOL 100 ML: 350 INJECTION, SOLUTION INTRAVENOUS at 23:00

## 2021-10-10 ASSESSMENT — FIBROSIS 4 INDEX: FIB4 SCORE: 0.62

## 2021-10-10 ASSESSMENT — PAIN DESCRIPTION - PAIN TYPE: TYPE: ACUTE PAIN

## 2021-10-11 ENCOUNTER — PHARMACY VISIT (OUTPATIENT)
Dept: PHARMACY | Facility: MEDICAL CENTER | Age: 66
End: 2021-10-11
Payer: COMMERCIAL

## 2021-10-11 VITALS
BODY MASS INDEX: 21.34 KG/M2 | TEMPERATURE: 98.6 F | SYSTOLIC BLOOD PRESSURE: 150 MMHG | RESPIRATION RATE: 15 BRPM | HEIGHT: 64 IN | HEART RATE: 99 BPM | OXYGEN SATURATION: 96 % | WEIGHT: 125 LBS | DIASTOLIC BLOOD PRESSURE: 87 MMHG

## 2021-10-11 PROCEDURE — RXMED WILLOW AMBULATORY MEDICATION CHARGE: Performed by: EMERGENCY MEDICINE

## 2021-10-11 RX ORDER — CEFDINIR 300 MG/1
300 CAPSULE ORAL 2 TIMES DAILY
Qty: 14 CAPSULE | Refills: 0 | Status: SHIPPED | OUTPATIENT
Start: 2021-10-11 | End: 2021-10-11 | Stop reason: SDUPTHER

## 2021-10-11 RX ORDER — CEFDINIR 300 MG/1
300 CAPSULE ORAL 2 TIMES DAILY
Qty: 14 CAPSULE | Refills: 0 | Status: SHIPPED | OUTPATIENT
Start: 2021-10-11 | End: 2021-10-18

## 2021-10-11 RX ORDER — ONDANSETRON 4 MG/1
4 TABLET, ORALLY DISINTEGRATING ORAL EVERY 6 HOURS PRN
Qty: 5 TABLET | Refills: 0 | Status: SHIPPED | OUTPATIENT
Start: 2021-10-11

## 2021-10-11 NOTE — ED NOTES
Urine collected and sent to lab. Pt provided juice and warm meal, sitting up in bed eating, NADN, call light in reach.

## 2021-10-11 NOTE — PROGRESS NOTES
Patient is belligerent and yelling. Rapid speech with unintelligible words with intermittent sobbing. Slightly uncooperative.

## 2021-10-11 NOTE — DISCHARGE PLANNING
Received text from pharmacy [facility], asking if we would like to do an approved service for pt. Pt's insurance does not cover
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
## 2021-10-11 NOTE — ED NOTES
"Pt provided water and crackers for PO challenge. Pt was able to eat and drink without difficulty, denies nausea. Pt continues demnad more pain medication stating \"the morphine doesn't work, Dilaudid is the only thing that works for me, I don't like it but I'll take 1mg if I have to.\"  "

## 2021-10-11 NOTE — ED NOTES
"Pt informed she is being discharged, pt became angry and started yelling at RN \"I'm freezing, I'm not leaving, leave me alone!\" RN explained that pt has been medically cleared by MD and there is no longer a reason for her to stay in the ED, pt stated \"Yeah, yeah whatever I'm done with you.\" RN tried to provide discharge instructions and teaching but pt refused and stated \"Whatever I don't give a shit.\" PIV removed, pt screamed, pulled away multiple times and tried to slap RN;'s hand away in process.RN escorted pt to lobby, pt resisted multiple times while yelling and tried to walk back to room, RN re-directed pt. Pt ambulatory out of ED with steady gait with discharge paperwork and all personal belongings.   "

## 2021-10-11 NOTE — ED TRIAGE NOTES
"Kayleen Alfredo  66 y.o. female  Chief Complaint   Patient presents with   • Flank Pain     Left side flank pain in kidneys. Pt has known diagnosis of kidney cancer. Ibuprofen at 1500. Wants pain management       Vitals:    10/10/21 1706   BP: 105/75   Pulse: (!) 112   Resp: 16   Temp: 36.4 °C (97.6 °F)   SpO2: 94%        Patient ambulatory into triage for the complaint above, and BIBA for the initial complaint of back pain. In triage, patient changed story to left sided kidney pain. Of chart review, pt does have a history of metastatic lung cancer with multiple lytic lesions to the spine along with previous compression fractures. Pt has poor pain control and is controlling pain at home with ibuprofen and indicated \"no pain meds at home.\" In triage pt noted that they are due to start chemo tomorrow on the Kidney for Kidney cancer, which is not found on chart, but has to \"cancel\" because she cant make it. Pt also noting that they are not from the area and has been seen at a outside hospital. Pt in triage not able to provide when they followed up with PCP or oncologist. Patient asking for narcotics to help control pain in triage.     Patient is in stable condition at time of triage, has been educated on the triage process and placed back into the ED lobby at this time - pt has verbalized understanding of this process.     RN encouraged patient to alert staff at front for any changes that may occur while they are waiting to be evaluated by an ERP.     Of note, this RN and patient are in proper PPE and has a mask in place at all times during this encounter.     Protocol Ordered    History reviewed. No pertinent past medical history.     "

## 2021-10-11 NOTE — ED NOTES
Pt from the lobby to blue 20 via wheelchair. Pt changed into gown and placed on continuous cardiac, BP and O2 monitors. Initial assessment complete. ERP to see.

## 2021-10-11 NOTE — ED NOTES
"Pt yelling at this RN and CT tech stating \"I'm not going anywhere until I get more pain medication and some food, I'm in pain dammit!\" This RN explained that scans need to be completed before food can be provided and that she just received morphine 10 minutes prior. Pt continues to yell that she is starving to death and needs food immediatley. Pt agreed to allow CT scan to be completed, pt to CT now.   "

## 2021-10-11 NOTE — DISCHARGE PLANNING
Pt returned to the lobby this AM after being d/c'd at about 0130 this morning. Pt was refusing to leave last night and got verbally aggressive with RN per chart review. Pt requesting to see JESSY MENDES met pt in the lobby, pt states she needs a place to stay and she needs means of transportation. JESSY explained to pt that she needs to continue to work with her CM at [facility] to secure housing, as that is not something we can accommodate here in the ED.    JESSY called [facility], spoke to Adriana. Pt is eligible to return on 10/13/2021 at 0900. JESSY gave pt a sticky note with this information. Pt states she cannot walk, ride the bus, or find any other way to get to the [facility] shelter. JESSY explains to pt that we have provided her with multiple cab vouchers recently, pt denies this. JESSY politely let pt know we will provide her with one more cab voucher, but she will need to start finding her own transportation following today.     Voucher #[id number]

## 2021-10-15 ENCOUNTER — PATIENT OUTREACH (OUTPATIENT)
Dept: OTHER | Facility: MEDICAL CENTER | Age: 66
End: 2021-10-15

## 2021-10-15 NOTE — PROGRESS NOTES
Received call from Bell with [facility] who is working with patient on housing.  Provided information on patient was suppose to follow up with Dr Lewis for her cancer treatment, provided contact information.  Also patient needed to establish with primary care for needed medications.  Provided nurse navigation contact information for further support if patient gets sent to [facility] for cancer treatement.

## 2024-10-09 NOTE — ED NOTES
Addended by: PATRICE SHIELDS on: 10/9/2024 04:36 PM     Modules accepted: Orders     Medicated per MAR

## 2024-12-22 NOTE — DOCUMENTATION QUERY
"                                                                         [facility]                                                                       Query Response Note      PATIENT:               CAPRICE BOSCH  ACCT #:                  [id number]  MRN:                     [id number]  :                      1955  ADMIT DATE:       2021 4:33 PM  DISCH DATE:          RESPONDING  PROVIDER #:        [id number]           QUERY TEXT:    Please clarify the relationship, if any, between \"intractable back pain\" and \"metastatic disease\" (neoplasm related pain).    The patient's Clinical Indicators include:  H&P: Intractable back pain. Metastatic disease, pulmonary mass w/ mets to spine. Lumbar compression fx likely pathologic.   IR Consult: CT-Chest/Abd previous admit - ill-defined mixed lytic and sclerotic bony metastasis   HM PN: Metastatic disease. Pulmonary mass w/ suspected spinal lesions. Pulmonary mass likely primary lung cancer  Treatment: Decadron; dilaudid; norco; oxycodone IR; pharmacy pain mgmt. review; IR consult  Risk Factors: Pulmonary mass w/ mets to spine. Lumbar compression fx likely pathologic.    Thank You,  Angelique Baugh RN  Clinical    Connect via GoPro  Options provided:   -- Condition intractable back pain is due to or associated with metastatic disease (neoplasm related pain)   -- Unrelated to each other   -- Unable to determine      Query created by: Angelique Baugh on 2021 10:35 AM    RESPONSE TEXT:    Condition intractable back pain is due to or associated with metastatic disease (neoplasm related pain)          Electronically signed by:  ISAURO HOOKER MD 2021 1:09 PM              " Audio/Video

## (undated) DEVICE — SUTURE GENERAL

## (undated) DEVICE — SUTURE 2-0 CHROMIC GUT SH 27 (36PK/BX)"

## (undated) DEVICE — ELECTRODE DUAL RETURN W/ CORD - (50/PK)

## (undated) DEVICE — SET EXTENSION WITH 2 PORTS (48EA/CA) ***PART #2C8610 IS A SUBSTITUTE*****

## (undated) DEVICE — GLOVE BIOGEL SZ 6 PF LATEX - (50EA/BX 4BX/CA)

## (undated) DEVICE — CHLORAPREP 26 ML APPLICATOR - ORANGE TINT(25/CA)

## (undated) DEVICE — SET LEADWIRE 5 LEAD BEDSIDE DISPOSABLE ECG (1SET OF 5/EA)

## (undated) DEVICE — TUBE CONNECT SUCTION CLEAR 120 X 1/4" (50EA/CA)"

## (undated) DEVICE — KIT ANESTHESIA W/CIRCUIT & 3/LT BAG W/FILTER (20EA/CA)

## (undated) DEVICE — TUBING CLEARLINK DUO-VENT - C-FLO (48EA/CA)

## (undated) DEVICE — TOWEL STOP TIMEOUT SAFETY FLAG (40EA/CA)

## (undated) DEVICE — ELECTRODE 850 FOAM ADHESIVE - HYDROGEL RADIOTRNSPRNT (50/PK)

## (undated) DEVICE — GLOVE SZ 7.5 BIOGEL PI MICRO - PF LF (50PR/BX)

## (undated) DEVICE — GAUZE FLUFF STERILE 2-PLY 36 X 36 (100EA/CA)

## (undated) DEVICE — SUCTION INSTRUMENT YANKAUER BULBOUS TIP W/O VENT (50EA/CA)

## (undated) DEVICE — SENSOR SPO2 NEO LNCS ADHESIVE (20/BX) SEE USER NOTES

## (undated) DEVICE — CANISTER SUCTION 3000ML MECHANICAL FILTER AUTO SHUTOFF MEDI-VAC NONSTERILE LF DISP  (40EA/CA)

## (undated) DEVICE — PROTECTOR ULNA NERVE - (36PR/CA)

## (undated) DEVICE — MASK ANESTHESIA ADULT  - (100/CA)

## (undated) DEVICE — NEPTUNE 4 PORT MANIFOLD - (20/PK)

## (undated) DEVICE — DRAPE LAPAROTOMY T SHEET - (12EA/CA)

## (undated) DEVICE — SODIUM CHL IRRIGATION 0.9% 1000ML (12EA/CA)

## (undated) DEVICE — GLOVE BIOGEL SZ 8 SURGICAL PF LTX - (50PR/BX 4BX/CA)

## (undated) DEVICE — HEAD HOLDER JUNIOR/ADULT

## (undated) DEVICE — LACTATED RINGERS INJ 1000 ML - (14EA/CA 60CA/PF)

## (undated) DEVICE — GLOVE BIOGEL PI INDICATOR SZ 6.5 SURGICAL PF LF - (50/BX 4BX/CA)

## (undated) DEVICE — TRAY SKIN SCRUB PVP WET (20EA/CA) PART #DYND70356 DISCONTINUED